# Patient Record
Sex: MALE | Race: WHITE | NOT HISPANIC OR LATINO | Employment: OTHER | URBAN - METROPOLITAN AREA
[De-identification: names, ages, dates, MRNs, and addresses within clinical notes are randomized per-mention and may not be internally consistent; named-entity substitution may affect disease eponyms.]

---

## 2022-03-22 ENCOUNTER — LAB REQUISITION (OUTPATIENT)
Dept: LAB | Facility: CLINIC | Age: 67
End: 2022-03-22
Payer: MEDICARE

## 2022-03-22 DIAGNOSIS — I10 ESSENTIAL (PRIMARY) HYPERTENSION: ICD-10-CM

## 2022-03-22 DIAGNOSIS — Z00.00 ENCOUNTER FOR GENERAL ADULT MEDICAL EXAMINATION WITHOUT ABNORMAL FINDINGS: ICD-10-CM

## 2022-03-22 LAB
ALBUMIN SERPL-MCNC: 3.9 G/DL (ref 3.5–5)
ALP SERPL-CCNC: 86 U/L (ref 45–120)
ALT SERPL W P-5'-P-CCNC: 15 U/L (ref 0–45)
ANION GAP SERPL CALCULATED.3IONS-SCNC: 12 MMOL/L (ref 5–18)
AST SERPL W P-5'-P-CCNC: 23 U/L (ref 0–40)
BILIRUB SERPL-MCNC: 0.9 MG/DL (ref 0–1)
BUN SERPL-MCNC: 14 MG/DL (ref 8–22)
CALCIUM SERPL-MCNC: 10.2 MG/DL (ref 8.5–10.5)
CHLORIDE BLD-SCNC: 103 MMOL/L (ref 98–107)
CO2 SERPL-SCNC: 28 MMOL/L (ref 22–31)
CREAT SERPL-MCNC: 0.8 MG/DL (ref 0.7–1.3)
GFR SERPL CREATININE-BSD FRML MDRD: >90 ML/MIN/1.73M2
GLUCOSE BLD-MCNC: 91 MG/DL (ref 70–125)
POTASSIUM BLD-SCNC: 4.6 MMOL/L (ref 3.5–5)
PROT SERPL-MCNC: 7 G/DL (ref 6–8)
PSA SERPL-MCNC: 1.36 UG/L (ref 0–4.5)
SODIUM SERPL-SCNC: 143 MMOL/L (ref 136–145)

## 2022-03-22 PROCEDURE — 80053 COMPREHEN METABOLIC PANEL: CPT | Mod: ORL | Performed by: FAMILY MEDICINE

## 2022-03-22 PROCEDURE — G0103 PSA SCREENING: HCPCS | Mod: ORL | Performed by: FAMILY MEDICINE

## 2022-04-08 ENCOUNTER — LAB REQUISITION (OUTPATIENT)
Dept: LAB | Facility: CLINIC | Age: 67
End: 2022-04-08
Payer: MEDICARE

## 2022-04-08 DIAGNOSIS — I10 ESSENTIAL (PRIMARY) HYPERTENSION: ICD-10-CM

## 2022-04-08 LAB
ANION GAP SERPL CALCULATED.3IONS-SCNC: 13 MMOL/L (ref 5–18)
BUN SERPL-MCNC: 17 MG/DL (ref 8–22)
CALCIUM SERPL-MCNC: 10 MG/DL (ref 8.5–10.5)
CHLORIDE BLD-SCNC: 102 MMOL/L (ref 98–107)
CO2 SERPL-SCNC: 26 MMOL/L (ref 22–31)
CREAT SERPL-MCNC: 0.84 MG/DL (ref 0.7–1.3)
GFR SERPL CREATININE-BSD FRML MDRD: >90 ML/MIN/1.73M2
GLUCOSE BLD-MCNC: 106 MG/DL (ref 70–125)
POTASSIUM BLD-SCNC: 3.8 MMOL/L (ref 3.5–5)
SODIUM SERPL-SCNC: 141 MMOL/L (ref 136–145)

## 2022-04-08 PROCEDURE — 80048 BASIC METABOLIC PNL TOTAL CA: CPT | Mod: ORL | Performed by: FAMILY MEDICINE

## 2022-05-17 ENCOUNTER — LAB REQUISITION (OUTPATIENT)
Dept: LAB | Facility: CLINIC | Age: 67
End: 2022-05-17
Payer: MEDICARE

## 2022-05-17 DIAGNOSIS — I10 ESSENTIAL (PRIMARY) HYPERTENSION: ICD-10-CM

## 2022-05-17 LAB
ANION GAP SERPL CALCULATED.3IONS-SCNC: 5 MMOL/L (ref 5–18)
BUN SERPL-MCNC: 21 MG/DL (ref 8–22)
CALCIUM SERPL-MCNC: 9.7 MG/DL (ref 8.5–10.5)
CHLORIDE BLD-SCNC: 105 MMOL/L (ref 98–107)
CO2 SERPL-SCNC: 33 MMOL/L (ref 22–31)
CREAT SERPL-MCNC: 0.98 MG/DL (ref 0.7–1.3)
GFR SERPL CREATININE-BSD FRML MDRD: 85 ML/MIN/1.73M2
GLUCOSE BLD-MCNC: 88 MG/DL (ref 70–125)
POTASSIUM BLD-SCNC: 4.4 MMOL/L (ref 3.5–5)
SODIUM SERPL-SCNC: 143 MMOL/L (ref 136–145)

## 2022-05-17 PROCEDURE — 80048 BASIC METABOLIC PNL TOTAL CA: CPT | Mod: ORL | Performed by: FAMILY MEDICINE

## 2022-11-21 ENCOUNTER — LAB REQUISITION (OUTPATIENT)
Dept: LAB | Facility: CLINIC | Age: 67
End: 2022-11-21
Payer: MEDICARE

## 2022-11-21 DIAGNOSIS — I10 ESSENTIAL (PRIMARY) HYPERTENSION: ICD-10-CM

## 2022-11-21 LAB
ALBUMIN SERPL BCG-MCNC: 4.4 G/DL (ref 3.5–5.2)
ALP SERPL-CCNC: 106 U/L (ref 40–129)
ALT SERPL W P-5'-P-CCNC: 24 U/L (ref 10–50)
ANION GAP SERPL CALCULATED.3IONS-SCNC: 11 MMOL/L (ref 7–15)
AST SERPL W P-5'-P-CCNC: 28 U/L (ref 10–50)
BILIRUB SERPL-MCNC: 0.7 MG/DL
BUN SERPL-MCNC: 19.2 MG/DL (ref 8–23)
CALCIUM SERPL-MCNC: 10.3 MG/DL (ref 8.8–10.2)
CHLORIDE SERPL-SCNC: 102 MMOL/L (ref 98–107)
CHOLEST SERPL-MCNC: 228 MG/DL
CREAT SERPL-MCNC: 0.94 MG/DL (ref 0.67–1.17)
DEPRECATED HCO3 PLAS-SCNC: 30 MMOL/L (ref 22–29)
GFR SERPL CREATININE-BSD FRML MDRD: 88 ML/MIN/1.73M2
GLUCOSE SERPL-MCNC: 83 MG/DL (ref 70–99)
HDLC SERPL-MCNC: 44 MG/DL
LDLC SERPL CALC-MCNC: 144 MG/DL
MAGNESIUM SERPL-MCNC: 2.1 MG/DL (ref 1.7–2.3)
NONHDLC SERPL-MCNC: 184 MG/DL
POTASSIUM SERPL-SCNC: 4.5 MMOL/L (ref 3.4–5.3)
PROT SERPL-MCNC: 6.8 G/DL (ref 6.4–8.3)
SODIUM SERPL-SCNC: 143 MMOL/L (ref 136–145)
TRIGL SERPL-MCNC: 199 MG/DL

## 2022-11-21 PROCEDURE — 80053 COMPREHEN METABOLIC PANEL: CPT | Mod: ORL | Performed by: FAMILY MEDICINE

## 2022-11-21 PROCEDURE — 83735 ASSAY OF MAGNESIUM: CPT | Mod: ORL | Performed by: FAMILY MEDICINE

## 2022-11-21 PROCEDURE — 80061 LIPID PANEL: CPT | Mod: ORL | Performed by: FAMILY MEDICINE

## 2023-05-22 ENCOUNTER — LAB REQUISITION (OUTPATIENT)
Dept: LAB | Facility: CLINIC | Age: 68
End: 2023-05-22
Payer: MEDICARE

## 2023-05-22 DIAGNOSIS — I10 ESSENTIAL (PRIMARY) HYPERTENSION: ICD-10-CM

## 2023-05-22 DIAGNOSIS — Z12.5 ENCOUNTER FOR SCREENING FOR MALIGNANT NEOPLASM OF PROSTATE: ICD-10-CM

## 2023-05-22 DIAGNOSIS — E78.2 MIXED HYPERLIPIDEMIA: ICD-10-CM

## 2023-05-22 PROCEDURE — 80053 COMPREHEN METABOLIC PANEL: CPT | Mod: ORL | Performed by: FAMILY MEDICINE

## 2023-05-22 PROCEDURE — G0103 PSA SCREENING: HCPCS | Mod: ORL | Performed by: FAMILY MEDICINE

## 2023-05-22 PROCEDURE — 80061 LIPID PANEL: CPT | Mod: ORL | Performed by: FAMILY MEDICINE

## 2023-05-23 LAB
ALBUMIN SERPL BCG-MCNC: 4.5 G/DL (ref 3.5–5.2)
ALP SERPL-CCNC: 84 U/L (ref 40–129)
ALT SERPL W P-5'-P-CCNC: 41 U/L (ref 10–50)
ANION GAP SERPL CALCULATED.3IONS-SCNC: 14 MMOL/L (ref 7–15)
AST SERPL W P-5'-P-CCNC: 38 U/L (ref 10–50)
BILIRUB SERPL-MCNC: 0.9 MG/DL
BUN SERPL-MCNC: 18.7 MG/DL (ref 8–23)
CALCIUM SERPL-MCNC: 9.4 MG/DL (ref 8.8–10.2)
CHLORIDE SERPL-SCNC: 103 MMOL/L (ref 98–107)
CHOLEST SERPL-MCNC: 153 MG/DL
CREAT SERPL-MCNC: 0.92 MG/DL (ref 0.67–1.17)
DEPRECATED HCO3 PLAS-SCNC: 25 MMOL/L (ref 22–29)
GFR SERPL CREATININE-BSD FRML MDRD: >90 ML/MIN/1.73M2
GLUCOSE SERPL-MCNC: 96 MG/DL (ref 70–99)
HDLC SERPL-MCNC: 54 MG/DL
LDLC SERPL CALC-MCNC: 79 MG/DL
NONHDLC SERPL-MCNC: 99 MG/DL
POTASSIUM SERPL-SCNC: 4.1 MMOL/L (ref 3.4–5.3)
PROT SERPL-MCNC: 6.9 G/DL (ref 6.4–8.3)
PSA SERPL DL<=0.01 NG/ML-MCNC: 1.61 NG/ML (ref 0–4.5)
SODIUM SERPL-SCNC: 142 MMOL/L (ref 136–145)
TRIGL SERPL-MCNC: 102 MG/DL

## 2023-11-27 ENCOUNTER — LAB REQUISITION (OUTPATIENT)
Dept: LAB | Facility: CLINIC | Age: 68
End: 2023-11-27
Payer: MEDICARE

## 2023-11-27 ENCOUNTER — TRANSFERRED RECORDS (OUTPATIENT)
Dept: HEALTH INFORMATION MANAGEMENT | Facility: CLINIC | Age: 68
End: 2023-11-27

## 2023-11-27 DIAGNOSIS — I10 ESSENTIAL (PRIMARY) HYPERTENSION: ICD-10-CM

## 2023-11-27 DIAGNOSIS — E78.2 MIXED HYPERLIPIDEMIA: ICD-10-CM

## 2023-11-27 PROCEDURE — 80053 COMPREHEN METABOLIC PANEL: CPT | Mod: ORL | Performed by: FAMILY MEDICINE

## 2023-11-27 PROCEDURE — 83735 ASSAY OF MAGNESIUM: CPT | Mod: ORL | Performed by: FAMILY MEDICINE

## 2023-11-27 PROCEDURE — 80061 LIPID PANEL: CPT | Mod: ORL | Performed by: FAMILY MEDICINE

## 2023-11-28 LAB
ALBUMIN SERPL BCG-MCNC: 4.4 G/DL (ref 3.5–5.2)
ALP SERPL-CCNC: 74 U/L (ref 40–150)
ALT SERPL W P-5'-P-CCNC: 36 U/L (ref 0–70)
ANION GAP SERPL CALCULATED.3IONS-SCNC: 11 MMOL/L (ref 7–15)
AST SERPL W P-5'-P-CCNC: 46 U/L (ref 0–45)
BILIRUB SERPL-MCNC: 0.8 MG/DL
BUN SERPL-MCNC: 15.9 MG/DL (ref 8–23)
CALCIUM SERPL-MCNC: 9.6 MG/DL (ref 8.8–10.2)
CHLORIDE SERPL-SCNC: 104 MMOL/L (ref 98–107)
CHOLEST SERPL-MCNC: 147 MG/DL
CREAT SERPL-MCNC: 0.92 MG/DL (ref 0.67–1.17)
DEPRECATED HCO3 PLAS-SCNC: 28 MMOL/L (ref 22–29)
EGFRCR SERPLBLD CKD-EPI 2021: 90 ML/MIN/1.73M2
GLUCOSE SERPL-MCNC: 90 MG/DL (ref 70–99)
HDLC SERPL-MCNC: 50 MG/DL
LDLC SERPL CALC-MCNC: 70 MG/DL
MAGNESIUM SERPL-MCNC: 2.1 MG/DL (ref 1.7–2.3)
NONHDLC SERPL-MCNC: 97 MG/DL
POTASSIUM SERPL-SCNC: 4.4 MMOL/L (ref 3.4–5.3)
PROT SERPL-MCNC: 6.9 G/DL (ref 6.4–8.3)
SODIUM SERPL-SCNC: 143 MMOL/L (ref 135–145)
TRIGL SERPL-MCNC: 135 MG/DL

## 2024-01-05 ENCOUNTER — TRANSFERRED RECORDS (OUTPATIENT)
Dept: HEALTH INFORMATION MANAGEMENT | Facility: CLINIC | Age: 69
End: 2024-01-05
Payer: MEDICARE

## 2024-01-11 ENCOUNTER — MEDICAL CORRESPONDENCE (OUTPATIENT)
Dept: HEALTH INFORMATION MANAGEMENT | Facility: CLINIC | Age: 69
End: 2024-01-11
Payer: MEDICARE

## 2024-01-12 ENCOUNTER — TELEPHONE (OUTPATIENT)
Dept: CARDIOLOGY | Facility: CLINIC | Age: 69
End: 2024-01-12
Payer: MEDICARE

## 2024-01-12 NOTE — TELEPHONE ENCOUNTER
Received faxed referral for pt to be seen for an ascending aortic aneurysm - Dr. Padilla to see, referral and records received from Dr. Casa Garrett from [facility], tel [phone] fax [phone].    Message sent to  to update demographics and insurance and call to schedule pt in clinic with Dr. Padilla.

## 2024-01-12 NOTE — TELEPHONE ENCOUNTER
Spoke with pt and for his scheduled for CV consult with Dr Padilla per Dr Guerin from hospitals. PT has not had any other imaging except for CT. Working on getting those images.

## 2024-01-15 ENCOUNTER — TELEPHONE (OUTPATIENT)
Dept: CARDIOLOGY | Facility: CLINIC | Age: 69
End: 2024-01-15
Payer: MEDICARE

## 2024-01-15 NOTE — TELEPHONE ENCOUNTER
Requested CT images from [facility]. They will be pushing to Our PACS  
Airway patent, Nasal mucosa clear. Mouth with normal mucosa.

## 2024-03-05 ENCOUNTER — OFFICE VISIT (OUTPATIENT)
Dept: CARDIOLOGY | Facility: CLINIC | Age: 69
End: 2024-03-05
Payer: MEDICARE

## 2024-03-05 VITALS
HEIGHT: 70 IN | BODY MASS INDEX: 33.36 KG/M2 | WEIGHT: 233 LBS | DIASTOLIC BLOOD PRESSURE: 100 MMHG | SYSTOLIC BLOOD PRESSURE: 162 MMHG | RESPIRATION RATE: 18 BRPM | HEART RATE: 64 BPM

## 2024-03-05 DIAGNOSIS — I71.21 ANEURYSM OF ASCENDING AORTA WITHOUT RUPTURE (H): Primary | ICD-10-CM

## 2024-03-05 DIAGNOSIS — I51.89 OTHER ILL-DEFINED HEART DISEASES: ICD-10-CM

## 2024-03-05 PROCEDURE — 99205 OFFICE O/P NEW HI 60 MIN: CPT | Performed by: SURGERY

## 2024-03-05 RX ORDER — LOSARTAN POTASSIUM AND HYDROCHLOROTHIAZIDE 12.5; 1 MG/1; MG/1
1 TABLET ORAL DAILY
Status: ON HOLD | COMMUNITY
End: 2024-04-06

## 2024-03-05 RX ORDER — ROSUVASTATIN CALCIUM 20 MG/1
20 TABLET, COATED ORAL DAILY
COMMUNITY

## 2024-03-05 NOTE — LETTER
3/5/2024    Casa Garrett MD  [address]  [facility] [address]    RE: Bobby Nelson       Dear Colleague,     I had the pleasure of seeing Bobby Nelson in the [facility].  Cardiac and Thoracic Surgery Consultation      Bobby Nelson MRN# [id number]   YOB: 1955 Age: 68 year old   Date of Admission: (Not on file)     Reason for consult: I was asked by Dr. Casa Garrett to evaluate this patient for an ascending aortic aneuyrsm.           Assessment and Plan:   Mr. Nelson is a 68-year-old man with an ascending aortic aneurysm measuring 5.4 cm in the mid ascending aorta. I discussed the natural history and complications of thoracic aortic aneurysms, including the risk of rupture or dissection. I recommend ascending aortic replacement to repair the aneurysm in lieu of ACC/AHA Guidelines. The sinuses of Valsalva look to be 4 cm or less on the non-contrast CT and the distal ascending aorta at the base of the innominate artery is <4cm  as well. These measurements will need to be confirmed. Preoperatively, he will need a CTA chest/abdomen/pelvis, coronary angiography, transthoracic echocardiography, carotid US, and dental clearance.     I described the technical details, as well as the expected postoperative course and recovery to the patient. I also discussed the risks and benefits of the procedure. The risks include but are not limited to bleeding, infection, stroke, heart failure, dysrhythmia, respiratory failure, kidney or liver injury, bowel or limb ischemia, and death. The estimated risk of mortality is 1-2%, and the estimated risk of major morbidity or mortality is <10%. The patient understands these risks and wishes to undergo the operation.     Surgery will be scheduled in the coming months.             Chief Complaint:   Mr. Nelson is a 68-year-old man with an ascending aortic aneurysm measuring 5.4 cm in the mid ascending aorta.    History is obtained from the  patient         History of Present Illness:   This patient is a 68 year old male who presents to discuss treatment of his ascending aortic aneurysm measuring 5.4 cm in the mid ascending aorta. He is followed by Dr. Casa Garrett at [facility], and  CT chest was obtained for screening purposes. He was noted to have the ascending thoracic aortic aneurysm. He does not have a family history of aneurysms, aortic dissection, or sudden death. 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 Unfortunately, he suffered a right upper extremity fracture recently and had surgery for that, but he is making a nice recovery.                 Past Medical History:   No past medical history on file.          Past Surgical History:   No past surgical history on file.            Social History:     Social History     Tobacco Use    Smoking status: Not on file    Smokeless tobacco: Not on file   Substance Use Topics    Alcohol use: Not on file             Family History:   No family history on file.          Immunizations:     Immunization History   Administered Date(s) Administered    COVID-19 Monovalent 18+ (Moderna) 03/10/2021, 04/07/2021             Allergies:   Not on File          Medications:     No current [facility]-ordered outpatient medications on file.     No current [facility]-ordered facility-administered medications on file.             Review of Systems:     The 10 point Review of Systems is negative other than noted in the HPI            Physical Exam:   Vitals were reviewed      BP: (!) 162/100 Pulse: 64   Resp: 18        Constitutional:   awake, alert, cooperative, no apparent distress, and appears stated age     Eyes:   Lids and lashes normal, pupils equal, round and reactive to light, extra ocular muscles intact, sclera clear, conjunctiva normal     ENT:   normocephalic, without obvious abnormality     Neck:   supple, symmetrical, trachea midline     Lungs:   no increased work of  breathing, good air exchange, and no retractions     Cardiovascular:   regular rate and rhythm     Abdomen:   non-distended     Musculoskeletal:   no lower extremity pitting edema present  there is no redness, warmth, or swelling of the joints  full range of motion noted  motor strength is 5 out of 5 all extremities bilaterally     Neurologic:   Mental Status Exam:  Level of Alertness:   awake  Orientation:   person, place, time  Cranial Nerves:  cranial nerves II-XII are grossly intact  Motor Exam:  moves all extremities well and symmetrically     Neuropsychiatric:   General: normal, calm, and normal eye contact  Level of consciousness: alert / normal  Affect: normal     Skin:   no bruising or bleeding, normal skin color, texture, turgor, and no redness, warmth, or swelling          Data:   All laboratory data reviewed  All cardiac studies reviewed by me.  All imaging studies reviewed by me.      Thank you for allowing me to participate in the care of your patient.      Sincerely,     James Padilla MD     [facility]  cc:   No referring provider defined for this encounter.

## 2024-03-05 NOTE — H&P (VIEW-ONLY)
Cardiac and Thoracic Surgery Consultation      Bobby Nelson MRN# [id number]   YOB: 1955 Age: 68 year old   Date of Admission: (Not on file)     Reason for consult: I was asked by Dr. Casa Garrett to evaluate this patient for an ascending aortic aneuyrsm.           Assessment and Plan:   Mr. Nelson is a 68-year-old man with an ascending aortic aneurysm measuring 5.4 cm in the mid ascending aorta. I discussed the natural history and complications of thoracic aortic aneurysms, including the risk of rupture or dissection. I recommend ascending aortic replacement to repair the aneurysm in lieu of ACC/AHA Guidelines. The sinuses of Valsalva look to be 4 cm or less on the non-contrast CT and the distal ascending aorta at the base of the innominate artery is <4cm  as well. These measurements will need to be confirmed. Preoperatively, he will need a CTA chest/abdomen/pelvis, coronary angiography, transthoracic echocardiography, carotid US, and dental clearance.     I described the technical details, as well as the expected postoperative course and recovery to the patient. I also discussed the risks and benefits of the procedure. The risks include but are not limited to bleeding, infection, stroke, heart failure, dysrhythmia, respiratory failure, kidney or liver injury, bowel or limb ischemia, and death. The estimated risk of mortality is 1-2%, and the estimated risk of major morbidity or mortality is <10%. The patient understands these risks and wishes to undergo the operation.     Surgery will be scheduled in the coming months.             Chief Complaint:   Mr. Nelson is a 68-year-old man with an ascending aortic aneurysm measuring 5.4 cm in the mid ascending aorta.    History is obtained from the patient         History of Present Illness:   This patient is a 68 year old male who presents to discuss treatment of his ascending aortic aneurysm measuring 5.4 cm in the mid ascending aorta. He is followed  by Dr. Casa Garrett at [facility], and  CT chest was obtained for screening purposes. He was noted to have the ascending thoracic aortic aneurysm. He does not have a family history of aneurysms, aortic dissection, or sudden death. 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 Unfortunately, he suffered a right upper extremity fracture recently and had surgery for that, but he is making a nice recovery.                 Past Medical History:   No past medical history on file.          Past Surgical History:   No past surgical history on file.            Social History:     Social History     Tobacco Use    Smoking status: Not on file    Smokeless tobacco: Not on file   Substance Use Topics    Alcohol use: Not on file             Family History:   No family history on file.          Immunizations:     Immunization History   Administered Date(s) Administered    COVID-19 Monovalent 18+ (Moderna) 03/10/2021, 04/07/2021             Allergies:   Not on File          Medications:     No current Epic-ordered outpatient medications on file.     No current [facility]-ordered facility-administered medications on file.             Review of Systems:     The 10 point Review of Systems is negative other than noted in the HPI            Physical Exam:   Vitals were reviewed      BP: (!) 162/100 Pulse: 64   Resp: 18        Constitutional:   awake, alert, cooperative, no apparent distress, and appears stated age     Eyes:   Lids and lashes normal, pupils equal, round and reactive to light, extra ocular muscles intact, sclera clear, conjunctiva normal     ENT:   normocephalic, without obvious abnormality     Neck:   supple, symmetrical, trachea midline     Lungs:   no increased work of breathing, good air exchange, and no retractions     Cardiovascular:   regular rate and rhythm     Abdomen:   non-distended     Musculoskeletal:   no lower extremity pitting edema present  there is no redness,  warmth, or swelling of the joints  full range of motion noted  motor strength is 5 out of 5 all extremities bilaterally     Neurologic:   Mental Status Exam:  Level of Alertness:   awake  Orientation:   person, place, time  Cranial Nerves:  cranial nerves II-XII are grossly intact  Motor Exam:  moves all extremities well and symmetrically     Neuropsychiatric:   General: normal, calm, and normal eye contact  Level of consciousness: alert / normal  Affect: normal     Skin:   no bruising or bleeding, normal skin color, texture, turgor, and no redness, warmth, or swelling          Data:   All laboratory data reviewed  All cardiac studies reviewed by me.  All imaging studies reviewed by me.

## 2024-03-05 NOTE — PATIENT INSTRUCTIONS
You were seen today in the [facility]    Schedule prior to surgery:    Dental clearance  CT chest/abdomen/pelvis with contrast  Echocardiogram  Angiogram    Please call ANN Bruno surgery coordinator with any questions.  Thank you.    Kiana Durham RN  Cardiovascular Surgery  Phone [phone]  Fax [phone]

## 2024-03-05 NOTE — PROGRESS NOTES
Cardiac and Thoracic Surgery Consultation      Bobby Nelson MRN# [id number]   YOB: 1955 Age: 68 year old   Date of Admission: (Not on file)     Reason for consult: I was asked by Dr. Casa Garrett to evaluate this patient for an ascending aortic aneuyrsm.           Assessment and Plan:   Mr. Nelson is a 68-year-old man with an ascending aortic aneurysm measuring 5.4 cm in the mid ascending aorta. I discussed the natural history and complications of thoracic aortic aneurysms, including the risk of rupture or dissection. I recommend ascending aortic replacement to repair the aneurysm in lieu of ACC/AHA Guidelines. The sinuses of Valsalva look to be 4 cm or less on the non-contrast CT and the distal ascending aorta at the base of the innominate artery is <4cm  as well. These measurements will need to be confirmed. Preoperatively, he will need a CTA chest/abdomen/pelvis, coronary angiography, transthoracic echocardiography, carotid US, and dental clearance.     I described the technical details, as well as the expected postoperative course and recovery to the patient. I also discussed the risks and benefits of the procedure. The risks include but are not limited to bleeding, infection, stroke, heart failure, dysrhythmia, respiratory failure, kidney or liver injury, bowel or limb ischemia, and death. The estimated risk of mortality is 1-2%, and the estimated risk of major morbidity or mortality is <10%. The patient understands these risks and wishes to undergo the operation.     Surgery will be scheduled in the coming months.             Chief Complaint:   Mr. Nelson is a 68-year-old man with an ascending aortic aneurysm measuring 5.4 cm in the mid ascending aorta.    History is obtained from the patient         History of Present Illness:   This patient is a 68 year old male who presents to discuss treatment of his ascending aortic aneurysm measuring 5.4 cm in the mid ascending aorta. He is followed  by Dr. Casa Garrett at [facility], and  CT chest was obtained for screening purposes. He was noted to have the ascending thoracic aortic aneurysm. He does not have a family history of aneurysms, aortic dissection, or sudden death. 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 Unfortunately, he suffered a right upper extremity fracture recently and had surgery for that, but he is making a nice recovery.                 Past Medical History:   No past medical history on file.          Past Surgical History:   No past surgical history on file.            Social History:     Social History     Tobacco Use    Smoking status: Not on file    Smokeless tobacco: Not on file   Substance Use Topics    Alcohol use: Not on file             Family History:   No family history on file.          Immunizations:     Immunization History   Administered Date(s) Administered    COVID-19 Monovalent 18+ (Moderna) 03/10/2021, 04/07/2021             Allergies:   Not on File          Medications:     No current Epic-ordered outpatient medications on file.     No current [facility]-ordered facility-administered medications on file.             Review of Systems:     The 10 point Review of Systems is negative other than noted in the HPI            Physical Exam:   Vitals were reviewed      BP: (!) 162/100 Pulse: 64   Resp: 18        Constitutional:   awake, alert, cooperative, no apparent distress, and appears stated age     Eyes:   Lids and lashes normal, pupils equal, round and reactive to light, extra ocular muscles intact, sclera clear, conjunctiva normal     ENT:   normocephalic, without obvious abnormality     Neck:   supple, symmetrical, trachea midline     Lungs:   no increased work of breathing, good air exchange, and no retractions     Cardiovascular:   regular rate and rhythm     Abdomen:   non-distended     Musculoskeletal:   no lower extremity pitting edema present  there is no redness,  warmth, or swelling of the joints  full range of motion noted  motor strength is 5 out of 5 all extremities bilaterally     Neurologic:   Mental Status Exam:  Level of Alertness:   awake  Orientation:   person, place, time  Cranial Nerves:  cranial nerves II-XII are grossly intact  Motor Exam:  moves all extremities well and symmetrically     Neuropsychiatric:   General: normal, calm, and normal eye contact  Level of consciousness: alert / normal  Affect: normal     Skin:   no bruising or bleeding, normal skin color, texture, turgor, and no redness, warmth, or swelling          Data:   All laboratory data reviewed  All cardiac studies reviewed by me.  All imaging studies reviewed by me.

## 2024-03-06 ENCOUNTER — TELEPHONE (OUTPATIENT)
Dept: CARDIOLOGY | Facility: CLINIC | Age: 69
End: 2024-03-06
Payer: MEDICARE

## 2024-03-06 ENCOUNTER — TELEPHONE (OUTPATIENT)
Dept: ADMINISTRATIVE | Facility: CLINIC | Age: 69
End: 2024-03-06
Payer: MEDICARE

## 2024-03-06 ENCOUNTER — PREP FOR PROCEDURE (OUTPATIENT)
Dept: ADMINISTRATIVE | Facility: CLINIC | Age: 69
End: 2024-03-06
Payer: MEDICARE

## 2024-03-06 DIAGNOSIS — I71.21 ANEURYSM OF ASCENDING AORTA WITHOUT RUPTURE (H): Primary | ICD-10-CM

## 2024-03-06 DIAGNOSIS — I51.89 OTHER ILL-DEFINED HEART DISEASES: ICD-10-CM

## 2024-03-06 RX ORDER — SODIUM CHLORIDE 9 MG/ML
INJECTION, SOLUTION INTRAVENOUS CONTINUOUS
Status: CANCELLED | OUTPATIENT
Start: 2024-03-14

## 2024-03-06 RX ORDER — LIDOCAINE 40 MG/G
CREAM TOPICAL
Status: CANCELLED | OUTPATIENT
Start: 2024-03-06

## 2024-03-06 RX ORDER — ASPIRIN 81 MG/1
243 TABLET, CHEWABLE ORAL ONCE
Status: CANCELLED | OUTPATIENT
Start: 2024-03-14

## 2024-03-06 RX ORDER — FENTANYL CITRATE 50 UG/ML
25 INJECTION, SOLUTION INTRAMUSCULAR; INTRAVENOUS
Status: CANCELLED | OUTPATIENT
Start: 2024-03-14

## 2024-03-06 RX ORDER — ASPIRIN 325 MG
325 TABLET ORAL ONCE
Status: CANCELLED | OUTPATIENT
Start: 2024-03-14 | End: 2024-03-06

## 2024-03-06 NOTE — TELEPHONE ENCOUNTER
Per task, pt needs to schedule surgery with Dr. Padilla. LM asking pt to call back. Will try again later

## 2024-03-11 ENCOUNTER — TELEPHONE (OUTPATIENT)
Dept: CARDIOLOGY | Facility: CLINIC | Age: 69
End: 2024-03-11
Payer: MEDICARE

## 2024-03-11 DIAGNOSIS — I51.89 OTHER ILL-DEFINED HEART DISEASES: ICD-10-CM

## 2024-03-11 DIAGNOSIS — I71.21 ANEURYSM OF ASCENDING AORTA WITHOUT RUPTURE (H): Primary | ICD-10-CM

## 2024-03-11 DIAGNOSIS — R79.89 OTHER SPECIFIED ABNORMAL FINDINGS OF BLOOD CHEMISTRY: ICD-10-CM

## 2024-03-11 DIAGNOSIS — I99.8 OTHER DISORDER OF CIRCULATORY SYSTEM: ICD-10-CM

## 2024-03-11 LAB
ABO/RH(D): NORMAL
ANTIBODY SCREEN: NEGATIVE
SPECIMEN EXPIRATION DATE: NORMAL

## 2024-03-11 RX ORDER — PHENYLEPHRINE HCL IN 0.9% NACL 50MG/250ML
.1-6 PLASTIC BAG, INJECTION (ML) INTRAVENOUS CONTINUOUS
Status: CANCELLED | OUTPATIENT
Start: 2024-04-02

## 2024-03-11 RX ORDER — CEFAZOLIN SODIUM/WATER 2 G/20 ML
2 SYRINGE (ML) INTRAVENOUS
Status: CANCELLED | OUTPATIENT
Start: 2024-04-02

## 2024-03-11 RX ORDER — CHLORHEXIDINE GLUCONATE ORAL RINSE 1.2 MG/ML
10 SOLUTION DENTAL ONCE
Status: CANCELLED | OUTPATIENT
Start: 2024-04-02 | End: 2024-03-11

## 2024-03-11 RX ORDER — CEFAZOLIN SODIUM/WATER 2 G/20 ML
2 SYRINGE (ML) INTRAVENOUS SEE ADMIN INSTRUCTIONS
Status: CANCELLED | OUTPATIENT
Start: 2024-04-02

## 2024-03-11 RX ORDER — DEXMEDETOMIDINE HYDROCHLORIDE 4 UG/ML
.2-1.2 INJECTION, SOLUTION INTRAVENOUS CONTINUOUS
Status: CANCELLED | OUTPATIENT
Start: 2024-04-02

## 2024-03-11 RX ORDER — SODIUM CHLORIDE, SODIUM GLUCONATE, SODIUM ACETATE, POTASSIUM CHLORIDE AND MAGNESIUM CHLORIDE 526; 502; 368; 37; 30 MG/100ML; MG/100ML; MG/100ML; MG/100ML; MG/100ML
1000 INJECTION, SOLUTION INTRAVENOUS
Status: CANCELLED | OUTPATIENT
Start: 2024-04-02 | End: 2024-04-02

## 2024-03-11 RX ORDER — LIDOCAINE 40 MG/G
CREAM TOPICAL
Status: CANCELLED | OUTPATIENT
Start: 2024-03-11

## 2024-03-11 NOTE — TELEPHONE ENCOUNTER
Coronary Angiogram Instructions:     Bobby Nelson  [address] [facility] [address] [facility] [address]  [phone] (home) [phone] (work)     You are scheduled for a coronary angiogram on Thursday, March 14 at [facility] ([facility] [address] [place] [address].       Your arrival time is 06:30 AM.  Location is [facility] - [address] [place] [address] - Main Entrance of the Hospital  Please plan on being at the hospital all day.  At any time, emergencies and/or urgent cases may come up which could delay the start of your procedure.     March 12 Your blood work is scheduled at 2:15 PM at [facility] , [address], [place], [place] .     Pre-procedure instructions - Coronary Angiogram  Patient Education     Please check in at the main hospital desk at [facility] [address] [place] [address]. From there you will be directed to the second floor where you will check in at the Card[facility] ([facility]) for your procedure. Heart care staff at the desk will give you further direction by asking you to sit in the waiting room until a member from [facility] is able to come out and escort you back to the designated room for pre-procedure.     Pre-procedure medication instructions  Patient instructed on antiplatelet medication.  Continue medications as scheduled, with a small amount of water on the day of the procedure unless indicated.  Patient instructed to take 325 mg of Aspirin am of procedure: Yes  Other medication: instructed to only take LOSARTAN the morning of the procedure.      Hold seven (7) days prior for once weekly injectable doses [semaglutide (Ozempic, Wegovy), dulaglutide  (Trulicity), exenatide ER (Bydureon), tirzepatide (Mounjaro)]   Hold the day before and day of for once daily injectable GLP-1 agonists [exenatide (Byetta), liraglutide (Saxenda,  Victoza)]   Hold seven (7) days for oral semaglutide  (Rybelsus)     COVID-19: Patient was informed if they develop cold like symptoms they should self test for COVID-19 at home. If results are positive, they should call [phone] to discuss next steps. This may include cancelling and rescheduling their procedure.  If patient is asymptomatic, no need to test for COVID-19.     Pre-procedure instructions  Patient instructed to be NPO after midnight.  Patient instructed to shower the evening before or the morning of the procedure.  Patient instructed to arrange for transportation home following procedure from a responsible family member of friend. No driving for at least 24 hours.  Patient instructed to have a responsible adult with them for 24 hours post-procedure.  Post-procedure follow up process.  Conscious sedation discussed.    ----- Message from Kiana Durham RN sent at 3/6/2024 10:11 AM CST -----  Review CATH instructions for 3/14    ----- Message -----  From: Shayna Ordonez  Sent: 3/6/2024   9:42 AM CST  To: Kiana Durham RN    Case type: LHC/CORS  Procedure Physician(s): JOSE DE JESUS/CALI  Procedure Date and Patient Arrival Time: Thursday 3/14, with arrival time of 0630  H&P: Completed on 3/5 [facility]  Pre-Procedure Lab Appt: Tuesday 3/12 at  - Please place lab orders if you haven't already!  Alerts/Important Info: None  Interpretor Requested: n/a    Thank Shayna hogan       ----- Message -----  From: Kiana Durham RN  Sent: 3/6/2024   9:15 AM CST  To: [facility] Procedure  Pool - Lhe    Hello!    Please call this pt to schedule a LHC/CORS. He has no alerts/allergies. Lab orders are in.    Thank you,  Kiana

## 2024-03-12 ENCOUNTER — HOSPITAL ENCOUNTER (OUTPATIENT)
Dept: CARDIOLOGY | Facility: CLINIC | Age: 69
Discharge: HOME | End: 2024-03-12
Attending: SURGERY | Admitting: SURGERY
Payer: MEDICARE

## 2024-03-12 ENCOUNTER — TELEPHONE (OUTPATIENT)
Dept: CARDIOLOGY | Facility: CLINIC | Age: 69
End: 2024-03-12

## 2024-03-12 ENCOUNTER — LAB (OUTPATIENT)
Dept: CARDIOLOGY | Facility: CLINIC | Age: 69
End: 2024-03-12
Payer: MEDICARE

## 2024-03-12 ENCOUNTER — HOSPITAL ENCOUNTER (OUTPATIENT)
Dept: CT IMAGING | Facility: HOSPITAL | Age: 69
Discharge: HOME | End: 2024-03-12
Attending: SURGERY | Admitting: SURGERY
Payer: MEDICARE

## 2024-03-12 DIAGNOSIS — I71.21 ANEURYSM OF ASCENDING AORTA WITHOUT RUPTURE (H): ICD-10-CM

## 2024-03-12 DIAGNOSIS — I51.89 OTHER ILL-DEFINED HEART DISEASES: ICD-10-CM

## 2024-03-12 LAB
ANION GAP SERPL CALCULATED.3IONS-SCNC: 9 MMOL/L (ref 7–15)
BUN SERPL-MCNC: 15.5 MG/DL (ref 8–23)
CALCIUM SERPL-MCNC: 9.5 MG/DL (ref 8.8–10.2)
CHLORIDE SERPL-SCNC: 103 MMOL/L (ref 98–107)
CREAT SERPL-MCNC: 0.92 MG/DL (ref 0.67–1.17)
DEPRECATED HCO3 PLAS-SCNC: 31 MMOL/L (ref 22–29)
EGFRCR SERPLBLD CKD-EPI 2021: 90 ML/MIN/1.73M2
ERYTHROCYTE [DISTWIDTH] IN BLOOD BY AUTOMATED COUNT: 12.9 % (ref 10–15)
GLUCOSE SERPL-MCNC: 88 MG/DL (ref 70–99)
HCT VFR BLD AUTO: 45.7 % (ref 40–53)
HGB BLD-MCNC: 14.9 G/DL (ref 13.3–17.7)
LVEF ECHO: NORMAL
MCH RBC QN AUTO: 29.7 PG (ref 26.5–33)
MCHC RBC AUTO-ENTMCNC: 32.6 G/DL (ref 31.5–36.5)
MCV RBC AUTO: 91 FL (ref 78–100)
PLATELET # BLD AUTO: 244 10E3/UL (ref 150–450)
POTASSIUM SERPL-SCNC: 4.1 MMOL/L (ref 3.4–5.3)
RBC # BLD AUTO: 5.01 10E6/UL (ref 4.4–5.9)
SODIUM SERPL-SCNC: 143 MMOL/L (ref 135–145)
WBC # BLD AUTO: 6.3 10E3/UL (ref 4–11)

## 2024-03-12 PROCEDURE — 85027 COMPLETE CBC AUTOMATED: CPT

## 2024-03-12 PROCEDURE — 255N000002 HC RX 255 OP 636: Performed by: SURGERY

## 2024-03-12 PROCEDURE — 36415 COLL VENOUS BLD VENIPUNCTURE: CPT

## 2024-03-12 PROCEDURE — 80048 BASIC METABOLIC PNL TOTAL CA: CPT

## 2024-03-12 PROCEDURE — 93306 TTE W/DOPPLER COMPLETE: CPT | Mod: 26 | Performed by: STUDENT IN AN ORGANIZED HEALTH CARE EDUCATION/TRAINING PROGRAM

## 2024-03-12 PROCEDURE — 250N000011 HC RX IP 250 OP 636: Performed by: SURGERY

## 2024-03-12 PROCEDURE — 86900 BLOOD TYPING SEROLOGIC ABO: CPT

## 2024-03-12 PROCEDURE — 86850 RBC ANTIBODY SCREEN: CPT

## 2024-03-12 PROCEDURE — 86901 BLOOD TYPING SEROLOGIC RH(D): CPT

## 2024-03-12 PROCEDURE — C8929 TTE W OR WO FOL WCON,DOPPLER: HCPCS

## 2024-03-12 PROCEDURE — 74174 CTA ABD&PLVS W/CONTRAST: CPT | Mod: MG

## 2024-03-12 RX ORDER — IOPAMIDOL 755 MG/ML
72 INJECTION, SOLUTION INTRAVASCULAR ONCE
Status: COMPLETED | OUTPATIENT
Start: 2024-03-12 | End: 2024-03-12

## 2024-03-12 RX ADMIN — IOPAMIDOL 72 ML: 755 INJECTION, SOLUTION INTRAVENOUS at 16:42

## 2024-03-12 RX ADMIN — PERFLUTREN 3 ML: 6.52 INJECTION, SUSPENSION INTRAVENOUS at 15:38

## 2024-03-12 NOTE — TELEPHONE ENCOUNTER
Spouse called with questions regarding surgery, angiogram and work up. Reviewed with her the plan for surgery, preop testing scheduled, and hereditary concerns.    Patient is scheduled to complete dental work on 3/18. Spouse has a blank dental clearance form she will send once his work is completed. She will also make his postop PCP follow up appointment now.    All questions addressed. Spouse requesting more information from Dr. Padilla regarding hereditary and genetic concerns for an aneurysm in their son.

## 2024-03-14 ENCOUNTER — HOSPITAL ENCOUNTER (OUTPATIENT)
Facility: HOSPITAL | Age: 69
Discharge: HOME | End: 2024-03-14
Attending: INTERNAL MEDICINE | Admitting: INTERNAL MEDICINE
Payer: MEDICARE

## 2024-03-14 VITALS
BODY MASS INDEX: 32.93 KG/M2 | RESPIRATION RATE: 16 BRPM | OXYGEN SATURATION: 97 % | SYSTOLIC BLOOD PRESSURE: 172 MMHG | HEIGHT: 70 IN | TEMPERATURE: 98 F | HEART RATE: 51 BPM | DIASTOLIC BLOOD PRESSURE: 104 MMHG | WEIGHT: 230 LBS

## 2024-03-14 DIAGNOSIS — I51.89 OTHER ILL-DEFINED HEART DISEASES: ICD-10-CM

## 2024-03-14 DIAGNOSIS — I71.21 ANEURYSM OF ASCENDING AORTA WITHOUT RUPTURE (H): ICD-10-CM

## 2024-03-14 PROBLEM — Z98.890 STATUS POST CORONARY ANGIOGRAM: Status: ACTIVE | Noted: 2024-03-14

## 2024-03-14 LAB
ATRIAL RATE - MUSE: 57 BPM
DIASTOLIC BLOOD PRESSURE - MUSE: NORMAL MMHG
INTERPRETATION ECG - MUSE: NORMAL
P AXIS - MUSE: -2 DEGREES
PR INTERVAL - MUSE: 202 MS
QRS DURATION - MUSE: 108 MS
QT - MUSE: 442 MS
QTC - MUSE: 430 MS
R AXIS - MUSE: -24 DEGREES
SYSTOLIC BLOOD PRESSURE - MUSE: NORMAL MMHG
T AXIS - MUSE: 33 DEGREES
VENTRICULAR RATE- MUSE: 57 BPM

## 2024-03-14 PROCEDURE — C1887 CATHETER, GUIDING: HCPCS | Performed by: INTERNAL MEDICINE

## 2024-03-14 PROCEDURE — 93010 ELECTROCARDIOGRAM REPORT: CPT | Mod: HOP | Performed by: INTERNAL MEDICINE

## 2024-03-14 PROCEDURE — 250N000011 HC RX IP 250 OP 636: Performed by: INTERNAL MEDICINE

## 2024-03-14 PROCEDURE — 999N000054 HC STATISTIC EKG NON-CHARGEABLE

## 2024-03-14 PROCEDURE — 258N000003 HC RX IP 258 OP 636: Performed by: SURGERY

## 2024-03-14 PROCEDURE — 250N000013 HC RX MED GY IP 250 OP 250 PS 637: Performed by: NURSE PRACTITIONER

## 2024-03-14 PROCEDURE — 93458 L HRT ARTERY/VENTRICLE ANGIO: CPT | Performed by: INTERNAL MEDICINE

## 2024-03-14 PROCEDURE — 99152 MOD SED SAME PHYS/QHP 5/>YRS: CPT | Performed by: INTERNAL MEDICINE

## 2024-03-14 PROCEDURE — 250N000009 HC RX 250: Performed by: INTERNAL MEDICINE

## 2024-03-14 PROCEDURE — 93458 L HRT ARTERY/VENTRICLE ANGIO: CPT | Mod: 26 | Performed by: INTERNAL MEDICINE

## 2024-03-14 PROCEDURE — C1894 INTRO/SHEATH, NON-LASER: HCPCS | Performed by: INTERNAL MEDICINE

## 2024-03-14 PROCEDURE — 255N000002 HC RX 255 OP 636: Performed by: INTERNAL MEDICINE

## 2024-03-14 PROCEDURE — C1769 GUIDE WIRE: HCPCS | Performed by: INTERNAL MEDICINE

## 2024-03-14 PROCEDURE — 272N000001 HC OR GENERAL SUPPLY STERILE: Performed by: INTERNAL MEDICINE

## 2024-03-14 PROCEDURE — 93005 ELECTROCARDIOGRAM TRACING: CPT

## 2024-03-14 RX ORDER — FENTANYL CITRATE 50 UG/ML
25 INJECTION, SOLUTION INTRAMUSCULAR; INTRAVENOUS
Status: DISCONTINUED | OUTPATIENT
Start: 2024-03-14 | End: 2024-03-14 | Stop reason: HOSPADM

## 2024-03-14 RX ORDER — ACETAMINOPHEN 325 MG/1
650 TABLET ORAL EVERY 4 HOURS PRN
Status: DISCONTINUED | OUTPATIENT
Start: 2024-03-14 | End: 2024-03-14 | Stop reason: HOSPADM

## 2024-03-14 RX ORDER — LIDOCAINE HYDROCHLORIDE 20 MG/ML
INJECTION, SOLUTION INFILTRATION; PERINEURAL
Status: DISCONTINUED | OUTPATIENT
Start: 2024-03-14 | End: 2024-03-14 | Stop reason: HOSPADM

## 2024-03-14 RX ORDER — NALOXONE HYDROCHLORIDE 0.4 MG/ML
0.4 INJECTION, SOLUTION INTRAMUSCULAR; INTRAVENOUS; SUBCUTANEOUS
Status: DISCONTINUED | OUTPATIENT
Start: 2024-03-14 | End: 2024-03-14 | Stop reason: HOSPADM

## 2024-03-14 RX ORDER — ASPIRIN 81 MG/1
243 TABLET, CHEWABLE ORAL ONCE
Status: COMPLETED | OUTPATIENT
Start: 2024-03-14 | End: 2024-03-14

## 2024-03-14 RX ORDER — OXYCODONE HYDROCHLORIDE 5 MG/1
5 TABLET ORAL EVERY 4 HOURS PRN
Status: DISCONTINUED | OUTPATIENT
Start: 2024-03-14 | End: 2024-03-14 | Stop reason: HOSPADM

## 2024-03-14 RX ORDER — DIAZEPAM 5 MG
5 TABLET ORAL ONCE
Status: COMPLETED | OUTPATIENT
Start: 2024-03-14 | End: 2024-03-14

## 2024-03-14 RX ORDER — IODIXANOL 320 MG/ML
INJECTION, SOLUTION INTRAVASCULAR
Status: DISCONTINUED | OUTPATIENT
Start: 2024-03-14 | End: 2024-03-14 | Stop reason: HOSPADM

## 2024-03-14 RX ORDER — FENTANYL CITRATE 50 UG/ML
INJECTION, SOLUTION INTRAMUSCULAR; INTRAVENOUS
Status: DISCONTINUED | OUTPATIENT
Start: 2024-03-14 | End: 2024-03-14 | Stop reason: HOSPADM

## 2024-03-14 RX ORDER — LIDOCAINE 40 MG/G
CREAM TOPICAL
Status: DISCONTINUED | OUTPATIENT
Start: 2024-03-14 | End: 2024-03-14 | Stop reason: HOSPADM

## 2024-03-14 RX ORDER — ASPIRIN 325 MG
325 TABLET ORAL ONCE
Status: COMPLETED | OUTPATIENT
Start: 2024-03-14 | End: 2024-03-14

## 2024-03-14 RX ORDER — ATROPINE SULFATE 0.1 MG/ML
0.5 INJECTION INTRAVENOUS
Status: DISCONTINUED | OUTPATIENT
Start: 2024-03-14 | End: 2024-03-14 | Stop reason: HOSPADM

## 2024-03-14 RX ORDER — NALOXONE HYDROCHLORIDE 0.4 MG/ML
0.2 INJECTION, SOLUTION INTRAMUSCULAR; INTRAVENOUS; SUBCUTANEOUS
Status: DISCONTINUED | OUTPATIENT
Start: 2024-03-14 | End: 2024-03-14 | Stop reason: HOSPADM

## 2024-03-14 RX ORDER — FLUMAZENIL 0.1 MG/ML
0.2 INJECTION, SOLUTION INTRAVENOUS
Status: DISCONTINUED | OUTPATIENT
Start: 2024-03-14 | End: 2024-03-14 | Stop reason: HOSPADM

## 2024-03-14 RX ORDER — SODIUM CHLORIDE 9 MG/ML
INJECTION, SOLUTION INTRAVENOUS CONTINUOUS
Status: DISCONTINUED | OUTPATIENT
Start: 2024-03-14 | End: 2024-03-14 | Stop reason: HOSPADM

## 2024-03-14 RX ORDER — OXYCODONE HYDROCHLORIDE 5 MG/1
10 TABLET ORAL EVERY 4 HOURS PRN
Status: DISCONTINUED | OUTPATIENT
Start: 2024-03-14 | End: 2024-03-14 | Stop reason: HOSPADM

## 2024-03-14 RX ADMIN — SODIUM CHLORIDE: 9 INJECTION, SOLUTION INTRAVENOUS at 07:09

## 2024-03-14 RX ADMIN — DIAZEPAM 5 MG: 5 TABLET ORAL at 08:21

## 2024-03-14 ASSESSMENT — ACTIVITIES OF DAILY LIVING (ADL)
ADLS_ACUITY_SCORE: 35

## 2024-03-14 ASSESSMENT — EJECTION FRACTION: EF_VALUE: .34

## 2024-03-14 NOTE — Clinical Note
Hemodynamic equipment used: 5 lead ECG, [employer] With 3 Leads, Machine BP Cuff and pulse oximeter probe.

## 2024-03-14 NOTE — DISCHARGE INSTRUCTIONS

## 2024-03-14 NOTE — INTERVAL H&P NOTE
"I have reviewed the surgical (or preoperative) H&P that is linked to this encounter, and examined the patient. There are no significant changes    Clinical Conditions Present on Arrival:  Clinically Significant Risk Factors Present on Admission                  # Obesity: Estimated body mass index is 33 kg/m  as calculated from the following:    Height as of this encounter: 1.778 m (5' 10\").    Weight as of this encounter: 104.3 kg (230 lb).       "

## 2024-03-14 NOTE — PRE-PROCEDURE
GENERAL PRE-PROCEDURE:   Procedure:  Coronary angiogram, left heart catheterization  Date/Time:  3/14/2024 7:08 AM    Written consent obtained?: Yes    Risks and benefits: Risks, benefits and alternatives were discussed    Consent given by:  Patient  Patient states understanding of procedure being performed: Yes    Patient's understanding of procedure matches consent: Yes    Procedure consent matches procedure scheduled: Yes    Expected level of sedation:  Moderate  Appropriately NPO:  Yes  ASA Class:  3 (ascending aortic aneurysm; 5.4cm)  Mallampati  :  Grade 3- soft palate visible, posterior pharyngeal wall not visible  Lungs:  Lungs clear with good breath sounds bilaterally  Heart:  Normal heart sounds and rate  History & Physical reviewed:  History and physical reviewed and updates made (see comment)  H&P Comments:  History & Physical reviewed:  History and physical reviewed and updates made (see comment)  H&P Comments:  Clinically Significant Risk Factors Present on Admission     Cardiovascular : Ascending aortic aneurysm; 5.4cm     Fluid & Electrolyte Disorders : Not present on admission     Gastroenterology : Not present on admission     Hematology/Oncology : Not present on admission     Nephrology : Not present on admission     Neurology : Not present on admission     Pulmonology : Not present on admission     Systemic : Not present on admission    Statement of review:  I have reviewed the lab findings, diagnostic data, medications, and the plan for sedation    Statement of review:  I have reviewed the lab findings, diagnostic data, medications, and the plan for sedation

## 2024-03-18 ENCOUNTER — TELEPHONE (OUTPATIENT)
Dept: CARDIOLOGY | Facility: CLINIC | Age: 69
End: 2024-03-18
Payer: MEDICARE

## 2024-03-18 NOTE — TELEPHONE ENCOUNTER
"----- Message from James Padilla MD sent at 3/13/2024  5:58 PM CDT -----  Rachel    It is ok to proceed with surgery and have the thyroid evaluated later.    Thank you  Toño      ----- Message -----  From: Kiana Durham RN  Sent: 3/13/2024   8:59 AM CDT  To: James Padilla MD    On his CT, it showed a right thyroid \"lesion\" measuring 4 x 3.5 cm. Do you want him to get this worked up prior to surgery with a CT or US? He is scheduled for surgery on April 2.    I will be off tomorrow and Friday, but Angeline and Edy will be covering for me. Message me back today and I will follow up.    Let me know, thank you!  Rachel      "

## 2024-03-26 ENCOUNTER — HOSPITAL ENCOUNTER (OUTPATIENT)
Dept: SURGERY | Facility: HOSPITAL | Age: 69
Discharge: HOME | End: 2024-03-26
Attending: SURGERY | Admitting: SURGERY
Payer: MEDICARE

## 2024-03-26 ENCOUNTER — TELEPHONE (OUTPATIENT)
Dept: CARDIOLOGY | Facility: CLINIC | Age: 69
End: 2024-03-26

## 2024-03-26 ENCOUNTER — ANESTHESIA EVENT (OUTPATIENT)
Dept: SURGERY | Facility: HOSPITAL | Age: 69
DRG: 220 | End: 2024-03-26
Payer: MEDICARE

## 2024-03-26 VITALS
HEIGHT: 70 IN | SYSTOLIC BLOOD PRESSURE: 137 MMHG | RESPIRATION RATE: 16 BRPM | OXYGEN SATURATION: 98 % | WEIGHT: 235.1 LBS | DIASTOLIC BLOOD PRESSURE: 95 MMHG | HEART RATE: 85 BPM | TEMPERATURE: 97.8 F | BODY MASS INDEX: 33.66 KG/M2

## 2024-03-26 DIAGNOSIS — R79.89 OTHER SPECIFIED ABNORMAL FINDINGS OF BLOOD CHEMISTRY: ICD-10-CM

## 2024-03-26 DIAGNOSIS — I99.8 OTHER DISORDER OF CIRCULATORY SYSTEM: ICD-10-CM

## 2024-03-26 DIAGNOSIS — I71.21 ANEURYSM OF ASCENDING AORTA WITHOUT RUPTURE (H): ICD-10-CM

## 2024-03-26 DIAGNOSIS — I51.89 OTHER ILL-DEFINED HEART DISEASES: ICD-10-CM

## 2024-03-26 LAB
ABO/RH(D): NORMAL
ALBUMIN UR-MCNC: NEGATIVE MG/DL
ANTIBODY SCREEN: NEGATIVE
APPEARANCE UR: CLEAR
APTT PPP: 29 SECONDS (ref 22–38)
BILIRUB UR QL STRIP: NEGATIVE
COLOR UR AUTO: NORMAL
GLUCOSE UR STRIP-MCNC: NEGATIVE MG/DL
HBA1C MFR BLD: 5.7 %
HGB UR QL STRIP: NEGATIVE
INR PPP: 0.98 (ref 0.85–1.15)
KETONES UR STRIP-MCNC: NEGATIVE MG/DL
LEUKOCYTE ESTERASE UR QL STRIP: NEGATIVE
MAGNESIUM SERPL-MCNC: 2 MG/DL (ref 1.7–2.3)
NITRATE UR QL: NEGATIVE
PH UR STRIP: 5 [PH] (ref 5–7)
PREALB SERPL-MCNC: 29.5 MG/DL (ref 20–40)
RBC URINE: 0 /HPF
SP GR UR STRIP: 1.02 (ref 1–1.03)
SPECIMEN EXPIRATION DATE: NORMAL
SQUAMOUS EPITHELIAL: 1 /HPF
UROBILINOGEN UR STRIP-MCNC: <2 MG/DL
WBC URINE: 1 /HPF

## 2024-03-26 PROCEDURE — 36415 COLL VENOUS BLD VENIPUNCTURE: CPT | Performed by: SURGERY

## 2024-03-26 PROCEDURE — 86900 BLOOD TYPING SEROLOGIC ABO: CPT | Performed by: SURGERY

## 2024-03-26 PROCEDURE — 81001 URINALYSIS AUTO W/SCOPE: CPT | Performed by: SURGERY

## 2024-03-26 PROCEDURE — 85610 PROTHROMBIN TIME: CPT | Performed by: SURGERY

## 2024-03-26 PROCEDURE — 83735 ASSAY OF MAGNESIUM: CPT | Performed by: SURGERY

## 2024-03-26 PROCEDURE — 85730 THROMBOPLASTIN TIME PARTIAL: CPT | Performed by: SURGERY

## 2024-03-26 PROCEDURE — 84134 ASSAY OF PREALBUMIN: CPT | Performed by: SURGERY

## 2024-03-26 PROCEDURE — 83036 HEMOGLOBIN GLYCOSYLATED A1C: CPT | Performed by: SURGERY

## 2024-03-26 RX ORDER — DEXMEDETOMIDINE HYDROCHLORIDE 4 UG/ML
.2-1 INJECTION, SOLUTION INTRAVENOUS CONTINUOUS
Status: CANCELLED | OUTPATIENT
Start: 2024-04-02

## 2024-03-26 RX ORDER — OMEGA-3/DHA/EPA/FISH OIL 60 MG-90MG
1000 CAPSULE ORAL DAILY
COMMUNITY

## 2024-03-26 NOTE — ANESTHESIA PREPROCEDURE EVALUATION
Anesthesia Pre-Procedure Evaluation    Patient: Bobby Nelson   MRN: [id number] : 1955        Procedure : Procedure(s):  Ascending aortic aneurysm replacement,  ANESTHESIA TRANSESOPHAGEAL ECHOCARDIOGRAM          Past Medical History:   Diagnosis Date    Asbestos exposure     Ascending aortic aneurysm (H24)     HLD (hyperlipidemia)     HTN (hypertension)     Thyroid nodule       Past Surgical History:   Procedure Laterality Date    CV CORONARY ANGIOGRAM N/A 2024    Procedure: Coronary Angiogram;  Surgeon: Jose Luis Jaffe MD;  Location: [facility] CATH LAB CV    CV LEFT HEART CATH N/A 2024    Procedure: Left Heart Catheterization;  Surgeon: Jose Luis Jaffe MD;  Location: [facility] CATH LAB CV    ELBOW SURGERY Right 2024    TONSILLECTOMY        No Known Allergies   Social History     Tobacco Use    Smoking status: Never    Smokeless tobacco: Never   Substance Use Topics    Alcohol use: Yes     Alcohol/week: 2.0 standard drinks of alcohol     Types: 2 Cans of beer per week      Wt Readings from Last 1 Encounters:   24 106.6 kg (235 lb 1.6 oz)        Anesthesia Evaluation   Pt has had prior anesthetic.     No history of anesthetic complications       ROS/MED HX  ENT/Pulmonary: Comment: H/o asbestos exposure      Neurologic:  - neg neurologic ROS     Cardiovascular: Comment:   Study Result    Narrative & Impression  EXAM: CTA CHEST ABDOMEN PELVIS W CONTRAST  LOCATION: [facility]  DATE: 3/12/2024     INDICATION:  Aneurysm of ascending aorta without rupture. Follow-up.  COMPARISON: Noncontrast chest CT on 2024  TECHNIQUE: CT angiogram chest abdomen pelvis during arterial phase of injection of IV contrast. 2D and 3D MIP reconstructions were performed by the CT technologist. Dose reduction techniques were used.   CONTRAST: 72 mL IV ISOVUE 370     FINDINGS:   CT ANGIOGRAM CHEST, ABDOMEN, AND PELVIS: Aneurysmal dilatation of the ascending thoracic aorta again seen  measuring 5.1 x 5.1 cm just below the level of the main pulmonary artery, stable. Descending thoracic aorta and abdominal aorta are normal in   caliber without aneurysmal dilatation. No dissection. Celiac artery, SMA, single left/2 right renal arteries and SALVATORE are patent. Mild ectasia of the left common iliac artery measuring 0.8 cm. Bilateral iliofemoral arteries are also patent without   dissection.        (+) Dyslipidemia hypertension- -   -  - -                                 Previous cardiac testing   Echo: Date: Results:    Stress Test:  Date: Results:    ECG Reviewed:  Date: Results:    Cath:  Date: Results:  Pre Procedure Diagnosis      pre-operative evaluation   Post Procedure Diagnosis      same    Conclusion    1.  Large-caliber normal-appearing epicardial coronary arteries in a right dominant system.  2.  LVEDP = 17 mmHg      METS/Exercise Tolerance:     Hematologic:  - neg hematologic  ROS     Musculoskeletal:  - neg musculoskeletal ROS     GI/Hepatic:  - neg GI/hepatic ROS     Renal/Genitourinary:       Endo:       Psychiatric/Substance Use:  - neg psychiatric ROS     Infectious Disease:  - neg infectious disease ROS     Malignancy:  - neg malignancy ROS     Other:            Physical Exam    Airway  airway exam normal      Mallampati: II   TM distance: > 3 FB   Neck ROM: limited   Mouth opening: > 3 cm    Respiratory Devices and Support         Dental  no notable dental history     (+) Modest Abnormalities - crowns, retainers, 1 or 2 missing teeth      Cardiovascular   cardiovascular exam normal       Rhythm and rate: regular and normal     Pulmonary   pulmonary exam normal        breath sounds clear to auscultation           OUTSIDE LABS:  CBC:   Lab Results   Component Value Date    WBC 6.3 03/12/2024    HGB 14.9 03/12/2024    HCT 45.7 03/12/2024     03/12/2024     BMP:   Lab Results   Component Value Date     03/12/2024     11/27/2023    POTASSIUM 4.1 03/12/2024    POTASSIUM 4.4  "11/27/2023    CHLORIDE 103 03/12/2024    CHLORIDE 104 11/27/2023    CO2 31 (H) 03/12/2024    CO2 28 11/27/2023    BUN 15.5 03/12/2024    BUN 15.9 11/27/2023    CR 0.92 03/12/2024    CR 0.92 11/27/2023    GLC 88 03/12/2024    GLC 90 11/27/2023     COAGS:   Lab Results   Component Value Date    PTT 29 03/26/2024    INR 0.98 03/26/2024     POC: No results found for: \"BGM\", \"HCG\", \"HCGS\"  HEPATIC:   Lab Results   Component Value Date    ALBUMIN 4.4 11/27/2023    PROTTOTAL 6.9 11/27/2023    ALT 36 11/27/2023    AST 46 (H) 11/27/2023    ALKPHOS 74 11/27/2023    BILITOTAL 0.8 11/27/2023     OTHER:   Lab Results   Component Value Date    A1C 5.7 (H) 03/26/2024    DERIAN 9.5 03/12/2024    MAG 2.0 03/26/2024       Anesthesia Plan    ASA Status:  3    NPO Status:  NPO Appropriate    Anesthesia Type: General.     - Airway: ETT   Induction: Intravenous.   Maintenance: Balanced.   Techniques and Equipment:     - Airway: Video-Laryngoscope     - Lines/Monitors: 2nd IV, Arterial Line, Central Line, PAC, CVP, LENNY            LENNY Absolute Contra-indication: NONE            LENNY Relative Contra-indication: NONE     - Blood: Blood in Room, PRBC, Cell Saver, PLT, Cryo, T&C, FFP     - Drips/Meds: Ketamine, Phenylephrine, Epinephrine, Nicardipine, Tranexamic acid, Dexmed. infusion     Consents    Anesthesia Plan(s) and associated risks, benefits, and realistic alternatives discussed. Questions answered and patient/representative(s) expressed understanding.     - Discussed: Risks, Benefits and Alternatives for BOTH SEDATION and the PROCEDURE were discussed     - Discussed with:  Patient      - Extended Intubation/Ventilatory Support Discussed: No.      - Patient is DNR/DNI Status: No     Use of blood products discussed: Yes.     - Discussed with: Patient.     Postoperative Care    Pain management: IV analgesics, Oral pain medications.     - Plan for long acting post-op opioid use   PONV prophylaxis: Ondansetron (or other 5HT-3), Dexamethasone " "or Solumedrol, Background Propofol Infusion     Comments:               Sorin Morales MD    I have reviewed the pertinent notes and labs in the chart from the past 30 days and (re)examined the patient.  Any updates or changes from those notes are reflected in this note.              # Obesity: Estimated body mass index is 33.73 kg/m  as calculated from the following:    Height as of 3/26/24: 1.778 m (5' 10\").    Weight as of 3/26/24: 106.6 kg (235 lb 1.6 oz).      "

## 2024-03-26 NOTE — PROGRESS NOTES
Pharmacist Admission Medication History    Admission medication history is complete. The information provided in this note is only as accurate as the sources available at the time of the update.    Information Source(s): Patient and CareEverywhere/SureScripts via in-person    Pertinent Information: Last administration dates to be entered on day of surgery. Patient was instructed to begin holding fish oil starting 3/27/24.    Allergies reviewed with patient and updates made in EHR: yes    Medication History Completed By: Aye Perez, ZenonD, [facility] 3/26/2024 9:14 AM      PTA Med List   Medication Sig Note Last Dose    fish oil-omega-3 fatty acids 500 MG capsule Take 1,000 mg by mouth daily 3/26/2024: Will hold starting 3/27/24 3/26/2024    losartan-hydrochlorothiazide (HYZAAR) 100-12.5 MG tablet Take 1 tablet by mouth daily      rosuvastatin (CRESTOR) 20 MG tablet Take 20 mg by mouth daily

## 2024-03-26 NOTE — TELEPHONE ENCOUNTER
Called pt spouse and left message requesting call back regarding recommendations Dr. Padilla has for her family. CV RN contact info provided.    ----- Message from James Padilla MD sent at 3/26/2024  9:17 AM CDT -----  Rachel    I have called twice now--once to the home number and once to her cell phone. I left a message on her cell and for first degree relatives, I recommended a transthoracic echocardiogram and an abdominal ultrasound to visualize the thoracic and abdominal aorta. They can get this through their primary care provider or I they can get a referral to a cardiologist. Can you try to contact Mrs. Nelson in case she needs clarification? I just need to know that she has had her questions answered.    Thank you   Toño  ----- Message -----  From: Kiana Durham RN  Sent: 3/12/2024   9:07 AM CDT  To: James Padilla MD    Hey there,    This pt's wife is wondering if you could call when you have a chance. He is the eitan with a 6.5 ascending, and is scheduled for surgery on 4/2. She had some additional questions about congenital/hereditary concerns I didn't feel totally equipped to answer, and she wasn't able to come to clinic with him when we met.    Do you mind calling her if/when you get a moment to go over considerations and recs for their son, in regards to aneurysms? I did mention he should probably get an echocardiogram at some point.    Thank you,  Rachel

## 2024-04-02 ENCOUNTER — APPOINTMENT (OUTPATIENT)
Dept: RADIOLOGY | Facility: HOSPITAL | Age: 69
DRG: 220 | End: 2024-04-02
Attending: PHYSICIAN ASSISTANT
Payer: MEDICARE

## 2024-04-02 ENCOUNTER — ANESTHESIA (OUTPATIENT)
Dept: SURGERY | Facility: HOSPITAL | Age: 69
DRG: 220 | End: 2024-04-02
Payer: MEDICARE

## 2024-04-02 ENCOUNTER — HOSPITAL ENCOUNTER (INPATIENT)
Facility: HOSPITAL | Age: 69
LOS: 4 days | Discharge: HOME | DRG: 220 | End: 2024-04-06
Attending: SURGERY | Admitting: SURGERY
Payer: MEDICARE

## 2024-04-02 ENCOUNTER — DOCUMENTATION ONLY (OUTPATIENT)
Dept: OTHER | Facility: CLINIC | Age: 69
End: 2024-04-02
Payer: MEDICARE

## 2024-04-02 DIAGNOSIS — I51.89 OTHER ILL-DEFINED HEART DISEASES: ICD-10-CM

## 2024-04-02 DIAGNOSIS — I71.21 ANEURYSM OF ASCENDING AORTA WITHOUT RUPTURE (H): ICD-10-CM

## 2024-04-02 DIAGNOSIS — Z98.890 S/P ASCENDING AORTIC ANEURYSM REPAIR: Primary | ICD-10-CM

## 2024-04-02 DIAGNOSIS — Z86.79 S/P ASCENDING AORTIC ANEURYSM REPAIR: Primary | ICD-10-CM

## 2024-04-02 LAB
ALBUMIN SERPL BCG-MCNC: 3.4 G/DL (ref 3.5–5.2)
ALP SERPL-CCNC: 72 U/L (ref 40–150)
ALT SERPL W P-5'-P-CCNC: 36 U/L (ref 0–70)
ANION GAP SERPL CALCULATED.3IONS-SCNC: 8 MMOL/L (ref 7–15)
APTT PPP: 41 SECONDS (ref 22–38)
APTT PPP: 43 SECONDS (ref 22–38)
AST SERPL W P-5'-P-CCNC: 54 U/L (ref 0–45)
BASE EXCESS BLDA CALC-SCNC: -0.3 MMOL/L (ref -3–3)
BASE EXCESS BLDA CALC-SCNC: -1.3 MMOL/L (ref -3–3)
BASE EXCESS BLDA CALC-SCNC: -1.4 MMOL/L (ref -3–3)
BASE EXCESS BLDA CALC-SCNC: -2.5 MMOL/L (ref -3–3)
BASE EXCESS BLDA CALC-SCNC: -4 MMOL/L (ref -3–3)
BASE EXCESS BLDA CALC-SCNC: 0.8 MMOL/L (ref -3–3)
BASE EXCESS BLDA CALC-SCNC: 1.2 MMOL/L (ref -3–3)
BASE EXCESS BLDV CALC-SCNC: 2.7 MMOL/L (ref -3–3)
BILIRUB SERPL-MCNC: 0.8 MG/DL
BLD PROD TYP BPU: NORMAL
BLD PROD TYP BPU: NORMAL
BLOOD COMPONENT TYPE: NORMAL
BLOOD COMPONENT TYPE: NORMAL
BUN SERPL-MCNC: 17.1 MG/DL (ref 8–23)
CA-I BLD-MCNC: 4.3 MG/DL (ref 4.4–5.2)
CA-I BLD-MCNC: 4.4 MG/DL (ref 4.4–5.2)
CA-I BLD-MCNC: 4.4 MG/DL (ref 4.4–5.2)
CA-I BLD-MCNC: 4.6 MG/DL (ref 4.4–5.2)
CA-I BLD-MCNC: 4.8 MG/DL (ref 4.4–5.2)
CA-I BLD-MCNC: 5 MG/DL (ref 4.4–5.2)
CALCIUM SERPL-MCNC: 8.3 MG/DL (ref 8.8–10.2)
CHLORIDE SERPL-SCNC: 111 MMOL/L (ref 98–107)
CODING SYSTEM: NORMAL
CODING SYSTEM: NORMAL
COHGB MFR BLD: 95.4 % (ref 95–96)
COHGB MFR BLD: 98.1 % (ref 95–96)
CPB POCT: NO
CREAT SERPL-MCNC: 0.89 MG/DL (ref 0.67–1.17)
CROSSMATCH: NORMAL
CROSSMATCH: NORMAL
DEPRECATED HCO3 PLAS-SCNC: 23 MMOL/L (ref 22–29)
EGFRCR SERPLBLD CKD-EPI 2021: >90 ML/MIN/1.73M2
ERYTHROCYTE [DISTWIDTH] IN BLOOD BY AUTOMATED COUNT: 12.7 % (ref 10–15)
ERYTHROCYTE [DISTWIDTH] IN BLOOD BY AUTOMATED COUNT: 12.7 % (ref 10–15)
FIBRINOGEN PPP-MCNC: 217 MG/DL (ref 170–490)
GLUCOSE BLD-MCNC: 106 MG/DL (ref 70–99)
GLUCOSE BLD-MCNC: 140 MG/DL (ref 70–99)
GLUCOSE BLD-MCNC: 160 MG/DL (ref 70–99)
GLUCOSE BLD-MCNC: 174 MG/DL (ref 70–99)
GLUCOSE BLD-MCNC: 186 MG/DL (ref 70–99)
GLUCOSE BLDC GLUCOMTR-MCNC: 117 MG/DL (ref 70–99)
GLUCOSE BLDC GLUCOMTR-MCNC: 125 MG/DL (ref 70–99)
GLUCOSE BLDC GLUCOMTR-MCNC: 128 MG/DL (ref 70–99)
GLUCOSE BLDC GLUCOMTR-MCNC: 129 MG/DL (ref 70–99)
GLUCOSE BLDC GLUCOMTR-MCNC: 136 MG/DL (ref 70–99)
GLUCOSE BLDC GLUCOMTR-MCNC: 138 MG/DL (ref 70–99)
GLUCOSE BLDC GLUCOMTR-MCNC: 142 MG/DL (ref 70–99)
GLUCOSE BLDC GLUCOMTR-MCNC: 142 MG/DL (ref 70–99)
GLUCOSE BLDC GLUCOMTR-MCNC: 153 MG/DL (ref 70–99)
GLUCOSE BLDC GLUCOMTR-MCNC: 159 MG/DL (ref 70–99)
GLUCOSE BLDC GLUCOMTR-MCNC: 191 MG/DL (ref 70–99)
GLUCOSE SERPL-MCNC: 184 MG/DL (ref 70–99)
HCO3 BLD-SCNC: 23 MMOL/L (ref 21–28)
HCO3 BLD-SCNC: 24 MMOL/L (ref 21–28)
HCO3 BLDA-SCNC: 22 MMOL/L (ref 21–28)
HCO3 BLDA-SCNC: 23 MMOL/L (ref 21–28)
HCO3 BLDA-SCNC: 24 MMOL/L (ref 21–28)
HCO3 BLDA-SCNC: 25 MMOL/L (ref 21–28)
HCO3 BLDA-SCNC: 25 MMOL/L (ref 21–28)
HCO3 BLDV-SCNC: 26 MMOL/L (ref 21–28)
HCT VFR BLD AUTO: 34.8 % (ref 40–53)
HCT VFR BLD AUTO: 38.8 % (ref 40–53)
HCT VFR BLD CALC: 36 % (ref 40–54)
HGB BLD-MCNC: 11.1 G/DL (ref 13.3–17.7)
HGB BLD-MCNC: 11.9 G/DL (ref 13.3–17.7)
HGB BLD-MCNC: 12.1 G/DL (ref 13.3–17.7)
HGB BLD-MCNC: 12.2 G/DL (ref 13.3–17.7)
HGB BLD-MCNC: 12.3 G/DL (ref 13.3–17.7)
HGB BLD-MCNC: 12.9 G/DL (ref 13.3–17.7)
HGB BLD-MCNC: 13 G/DL (ref 13.3–17.7)
HGB BLD-MCNC: 13.5 G/DL (ref 13.3–17.7)
INR PPP: 1.32 (ref 0.85–1.15)
INR PPP: 1.35 (ref 0.85–1.15)
ISSUE DATE AND TIME: NORMAL
ISSUE DATE AND TIME: NORMAL
LACTATE BLD-SCNC: 1.3 MMOL/L (ref 0.7–2)
LACTATE BLD-SCNC: 1.8 MMOL/L (ref 0.7–2)
LACTATE BLD-SCNC: 2 MMOL/L (ref 0.7–2)
LACTATE BLD-SCNC: 2.4 MMOL/L (ref 0.7–2)
LACTATE BLD-SCNC: 2.9 MMOL/L (ref 0.7–2)
LACTATE SERPL-SCNC: 2.1 MMOL/L (ref 0.7–2)
MAGNESIUM SERPL-MCNC: 2.9 MG/DL (ref 1.7–2.3)
MCH RBC QN AUTO: 30 PG (ref 26.5–33)
MCH RBC QN AUTO: 30.7 PG (ref 26.5–33)
MCHC RBC AUTO-ENTMCNC: 33.5 G/DL (ref 31.5–36.5)
MCHC RBC AUTO-ENTMCNC: 34.2 G/DL (ref 31.5–36.5)
MCV RBC AUTO: 90 FL (ref 78–100)
MCV RBC AUTO: 90 FL (ref 78–100)
O2/TOTAL GAS SETTING VFR VENT: 0 %
O2/TOTAL GAS SETTING VFR VENT: 21 %
OXYHGB MFR BLDA: 98 % (ref 92–100)
OXYHGB MFR BLDA: 98 % (ref 92–100)
OXYHGB MFR BLDA: 99 % (ref 92–100)
OXYHGB MFR BLDA: 99 % (ref 92–100)
OXYHGB MFR BLDV: 77 % (ref 70–75)
PCO2 BLD: 42 MM HG (ref 35–45)
PCO2 BLD: 43 MM HG (ref 35–45)
PCO2 BLDA: 42 MM HG (ref 35–45)
PCO2 BLDA: 47 MM HG (ref 35–45)
PCO2 BLDA: 48 MM HG (ref 35–45)
PCO2 BLDA: 49 MM HG (ref 35–45)
PCO2 BLDA: 49 MM HG (ref 35–45)
PCO2 BLDV: 57 MM HG (ref 40–50)
PEEP: 5 CM H2O
PH BLD: 7.34 [PH] (ref 7.35–7.45)
PH BLD: 7.37 [PH] (ref 7.35–7.45)
PH BLDA: 7.33 [PH] (ref 7.35–7.45)
PH BLDA: 7.34 [PH] (ref 7.35–7.45)
PH BLDA: 7.36 [PH] (ref 7.35–7.45)
PH BLDV: 7.31 [PH] (ref 7.32–7.43)
PHOSPHATE SERPL-MCNC: 2.1 MG/DL (ref 2.5–4.5)
PLATELET # BLD AUTO: 132 10E3/UL (ref 150–450)
PLATELET # BLD AUTO: 149 10E3/UL (ref 150–450)
PO2 BLD: 77 MM HG (ref 80–105)
PO2 BLD: 95 MM HG (ref 80–105)
PO2 BLDA: 352 MM HG (ref 80–105)
PO2 BLDA: 358 MM HG (ref 80–105)
PO2 BLDA: 366 MM HG (ref 80–105)
PO2 BLDA: 392 MM HG (ref 80–105)
PO2 BLDA: 77 MM HG (ref 80–105)
PO2 BLDV: 46 MM HG (ref 25–47)
POTASSIUM BLD-SCNC: 4.1 MMOL/L (ref 3.4–5.3)
POTASSIUM BLD-SCNC: 4.6 MMOL/L (ref 3.4–5.3)
POTASSIUM BLD-SCNC: 4.6 MMOL/L (ref 3.4–5.3)
POTASSIUM BLD-SCNC: 5.1 MMOL/L (ref 3.4–5.3)
POTASSIUM BLD-SCNC: 5.3 MMOL/L (ref 3.4–5.3)
POTASSIUM BLD-SCNC: 5.6 MMOL/L (ref 3.4–5.3)
POTASSIUM SERPL-SCNC: 4.6 MMOL/L (ref 3.4–5.3)
POTASSIUM SERPL-SCNC: 4.6 MMOL/L (ref 3.4–5.3)
PROT SERPL-MCNC: 5.2 G/DL (ref 6.4–8.3)
RBC # BLD AUTO: 3.87 10E6/UL (ref 4.4–5.9)
RBC # BLD AUTO: 4.33 10E6/UL (ref 4.4–5.9)
SAO2 % BLDA: 100 % (ref 95–96)
SAO2 % BLDA: 94 % (ref 92–100)
SAO2 % BLDA: 94 % (ref 92–100)
SAO2 % BLDA: 97 % (ref 92–100)
SAO2 % BLDV: 78 % (ref 70–75)
SODIUM BLD-SCNC: 140 MMOL/L (ref 135–145)
SODIUM BLD-SCNC: 140 MMOL/L (ref 135–145)
SODIUM BLD-SCNC: 141 MMOL/L (ref 135–145)
SODIUM BLD-SCNC: 142 MMOL/L (ref 135–145)
SODIUM BLD-SCNC: 143 MMOL/L (ref 135–145)
SODIUM BLD-SCNC: 143 MMOL/L (ref 135–145)
SODIUM SERPL-SCNC: 142 MMOL/L (ref 135–145)
UNIT ABO/RH: NORMAL
UNIT ABO/RH: NORMAL
UNIT NUMBER: NORMAL
UNIT NUMBER: NORMAL
UNIT STATUS: NORMAL
UNIT STATUS: NORMAL
UNIT TYPE ISBT: 5100
UNIT TYPE ISBT: 5100
WBC # BLD AUTO: 11.2 10E3/UL (ref 4–11)
WBC # BLD AUTO: 11.6 10E3/UL (ref 4–11)

## 2024-04-02 PROCEDURE — 85610 PROTHROMBIN TIME: CPT | Performed by: SURGERY

## 2024-04-02 PROCEDURE — 82805 BLOOD GASES W/O2 SATURATION: CPT | Performed by: NURSE PRACTITIONER

## 2024-04-02 PROCEDURE — 250N000009 HC RX 250: Performed by: SURGERY

## 2024-04-02 PROCEDURE — 999N000065 XR CHEST PORT 1 VIEW

## 2024-04-02 PROCEDURE — 370N000017 HC ANESTHESIA TECHNICAL FEE, PER MIN: Performed by: SURGERY

## 2024-04-02 PROCEDURE — 85730 THROMBOPLASTIN TIME PARTIAL: CPT | Performed by: SURGERY

## 2024-04-02 PROCEDURE — 84100 ASSAY OF PHOSPHORUS: CPT | Performed by: PHYSICIAN ASSISTANT

## 2024-04-02 PROCEDURE — 82803 BLOOD GASES ANY COMBINATION: CPT

## 2024-04-02 PROCEDURE — 250N000011 HC RX IP 250 OP 636: Performed by: ANESTHESIOLOGY

## 2024-04-02 PROCEDURE — 258N000003 HC RX IP 258 OP 636: Performed by: ANESTHESIOLOGY

## 2024-04-02 PROCEDURE — 250N000013 HC RX MED GY IP 250 OP 250 PS 637: Performed by: SURGERY

## 2024-04-02 PROCEDURE — 99291 CRITICAL CARE FIRST HOUR: CPT | Performed by: NURSE PRACTITIONER

## 2024-04-02 PROCEDURE — 410N000003 HC PER-PERFUSION 1ST 30 MIN: Performed by: SURGERY

## 2024-04-02 PROCEDURE — 86923 COMPATIBILITY TEST ELECTRIC: CPT | Performed by: SURGERY

## 2024-04-02 PROCEDURE — 272N000202 HC AEROBIKA WITH MANOMETER

## 2024-04-02 PROCEDURE — 82330 ASSAY OF CALCIUM: CPT | Performed by: PHYSICIAN ASSISTANT

## 2024-04-02 PROCEDURE — C1763 CONN TISS, NON-HUMAN: HCPCS | Performed by: SURGERY

## 2024-04-02 PROCEDURE — 83605 ASSAY OF LACTIC ACID: CPT | Performed by: PHYSICIAN ASSISTANT

## 2024-04-02 PROCEDURE — 410N000004: Performed by: SURGERY

## 2024-04-02 PROCEDURE — 250N000011 HC RX IP 250 OP 636: Performed by: SURGERY

## 2024-04-02 PROCEDURE — 272N000001 HC OR GENERAL SUPPLY STERILE: Performed by: SURGERY

## 2024-04-02 PROCEDURE — C1768 GRAFT, VASCULAR: HCPCS | Performed by: SURGERY

## 2024-04-02 PROCEDURE — 250N000013 HC RX MED GY IP 250 OP 250 PS 637: Performed by: PHYSICIAN ASSISTANT

## 2024-04-02 PROCEDURE — 200N000001 HC R&B ICU

## 2024-04-02 PROCEDURE — 85384 FIBRINOGEN ACTIVITY: CPT | Performed by: SURGERY

## 2024-04-02 PROCEDURE — 82330 ASSAY OF CALCIUM: CPT

## 2024-04-02 PROCEDURE — P9045 ALBUMIN (HUMAN), 5%, 250 ML: HCPCS | Mod: JZ | Performed by: ANESTHESIOLOGY

## 2024-04-02 PROCEDURE — 250N000009 HC RX 250: Performed by: PHYSICIAN ASSISTANT

## 2024-04-02 PROCEDURE — 94799 UNLISTED PULMONARY SVC/PX: CPT

## 2024-04-02 PROCEDURE — 85730 THROMBOPLASTIN TIME PARTIAL: CPT | Performed by: PHYSICIAN ASSISTANT

## 2024-04-02 PROCEDURE — 82805 BLOOD GASES W/O2 SATURATION: CPT | Performed by: PHYSICIAN ASSISTANT

## 2024-04-02 PROCEDURE — 258N000003 HC RX IP 258 OP 636: Performed by: SURGERY

## 2024-04-02 PROCEDURE — 83735 ASSAY OF MAGNESIUM: CPT | Performed by: PHYSICIAN ASSISTANT

## 2024-04-02 PROCEDURE — 85027 COMPLETE CBC AUTOMATED: CPT | Performed by: SURGERY

## 2024-04-02 PROCEDURE — 360N000079 HC SURGERY LEVEL 6, PER MIN: Performed by: SURGERY

## 2024-04-02 PROCEDURE — 94002 VENT MGMT INPAT INIT DAY: CPT

## 2024-04-02 PROCEDURE — 250N000025 HC SEVOFLURANE, PER MIN: Performed by: SURGERY

## 2024-04-02 PROCEDURE — 02RX0JZ REPLACEMENT OF THORACIC AORTA, ASCENDING/ARCH WITH SYNTHETIC SUBSTITUTE, OPEN APPROACH: ICD-10-PCS | Performed by: SURGERY

## 2024-04-02 PROCEDURE — 272N000004 HC RX 272: Performed by: SURGERY

## 2024-04-02 PROCEDURE — P9016 RBC LEUKOCYTES REDUCED: HCPCS | Performed by: SURGERY

## 2024-04-02 PROCEDURE — 5A1221Z PERFORMANCE OF CARDIAC OUTPUT, CONTINUOUS: ICD-10-PCS | Performed by: SURGERY

## 2024-04-02 PROCEDURE — 33268 EXCL LAA OPN OTH PX ANY METH: CPT | Performed by: SURGERY

## 2024-04-02 PROCEDURE — 999N000259 HC STATISTIC EXTUBATION

## 2024-04-02 PROCEDURE — 999N000253 HC STATISTIC WEANING TRIALS

## 2024-04-02 PROCEDURE — 999N000141 HC STATISTIC PRE-PROCEDURE NURSING ASSESSMENT: Performed by: SURGERY

## 2024-04-02 PROCEDURE — 33859 AS-AORT GRF F/DS OTH/THN DSJ: CPT | Performed by: SURGERY

## 2024-04-02 PROCEDURE — 999N000157 HC STATISTIC RCP TIME EA 10 MIN

## 2024-04-02 PROCEDURE — 250N000009 HC RX 250: Performed by: ANESTHESIOLOGY

## 2024-04-02 PROCEDURE — C1898 LEAD, PMKR, OTHER THAN TRANS: HCPCS | Performed by: SURGERY

## 2024-04-02 PROCEDURE — 88305 TISSUE EXAM BY PATHOLOGIST: CPT | Mod: TC | Performed by: SURGERY

## 2024-04-02 PROCEDURE — 85027 COMPLETE CBC AUTOMATED: CPT | Performed by: PHYSICIAN ASSISTANT

## 2024-04-02 PROCEDURE — 250N000011 HC RX IP 250 OP 636: Performed by: PHYSICIAN ASSISTANT

## 2024-04-02 PROCEDURE — 85610 PROTHROMBIN TIME: CPT | Performed by: PHYSICIAN ASSISTANT

## 2024-04-02 PROCEDURE — 258N000003 HC RX IP 258 OP 636: Performed by: PHYSICIAN ASSISTANT

## 2024-04-02 PROCEDURE — 02B70ZK EXCISION OF LEFT ATRIAL APPENDAGE, OPEN APPROACH: ICD-10-PCS | Performed by: SURGERY

## 2024-04-02 PROCEDURE — 84132 ASSAY OF SERUM POTASSIUM: CPT | Performed by: PHYSICIAN ASSISTANT

## 2024-04-02 DEVICE — GRAFT PERICARDIUM 8X14CM PERI-GUARD PG0814: Type: IMPLANTABLE DEVICE | Site: CHEST | Status: FUNCTIONAL

## 2024-04-02 DEVICE — HEMASHIELD PLATINUM WOVEN STRAIGHT DOUBLE VELOUR VASCULAR GRAFT WITH GRAFT SIZER ACCESSORY
Type: IMPLANTABLE DEVICE | Site: CHEST | Status: FUNCTIONAL
Brand: HEMASHIELD

## 2024-04-02 RX ORDER — CALCIUM GLUCONATE 20 MG/ML
2 INJECTION, SOLUTION INTRAVENOUS
Status: DISCONTINUED | OUTPATIENT
Start: 2024-04-02 | End: 2024-04-06 | Stop reason: HOSPADM

## 2024-04-02 RX ORDER — VASOPRESSIN IN 0.9 % NACL 2 UNIT/2ML
SYRINGE (ML) INTRAVENOUS PRN
Status: DISCONTINUED | OUTPATIENT
Start: 2024-04-02 | End: 2024-04-02

## 2024-04-02 RX ORDER — DEXAMETHASONE SODIUM PHOSPHATE 10 MG/ML
INJECTION, SOLUTION INTRAMUSCULAR; INTRAVENOUS PRN
Status: DISCONTINUED | OUTPATIENT
Start: 2024-04-02 | End: 2024-04-02

## 2024-04-02 RX ORDER — ACETAMINOPHEN 325 MG/1
975 TABLET ORAL EVERY 8 HOURS
Status: DISCONTINUED | OUTPATIENT
Start: 2024-04-02 | End: 2024-04-06 | Stop reason: HOSPADM

## 2024-04-02 RX ORDER — POLYETHYLENE GLYCOL 3350 17 G/17G
17 POWDER, FOR SOLUTION ORAL DAILY
Status: DISCONTINUED | OUTPATIENT
Start: 2024-04-03 | End: 2024-04-06 | Stop reason: HOSPADM

## 2024-04-02 RX ORDER — ASPIRIN 81 MG/1
81 TABLET, CHEWABLE ORAL DAILY
Status: DISCONTINUED | OUTPATIENT
Start: 2024-04-03 | End: 2024-04-06 | Stop reason: HOSPADM

## 2024-04-02 RX ORDER — OXYCODONE HYDROCHLORIDE 5 MG/1
10 TABLET ORAL EVERY 4 HOURS PRN
Status: DISCONTINUED | OUTPATIENT
Start: 2024-04-02 | End: 2024-04-06 | Stop reason: HOSPADM

## 2024-04-02 RX ORDER — AMOXICILLIN 250 MG
1 CAPSULE ORAL 2 TIMES DAILY
Status: DISCONTINUED | OUTPATIENT
Start: 2024-04-03 | End: 2024-04-06 | Stop reason: HOSPADM

## 2024-04-02 RX ORDER — DEXMEDETOMIDINE HYDROCHLORIDE 4 UG/ML
.2-1.2 INJECTION, SOLUTION INTRAVENOUS CONTINUOUS
Status: DISCONTINUED | OUTPATIENT
Start: 2024-04-02 | End: 2024-04-02

## 2024-04-02 RX ORDER — LIDOCAINE 40 MG/G
CREAM TOPICAL
Status: DISCONTINUED | OUTPATIENT
Start: 2024-04-02 | End: 2024-04-02

## 2024-04-02 RX ORDER — ONDANSETRON 4 MG/1
4 TABLET, ORALLY DISINTEGRATING ORAL EVERY 6 HOURS PRN
Status: DISCONTINUED | OUTPATIENT
Start: 2024-04-02 | End: 2024-04-06 | Stop reason: HOSPADM

## 2024-04-02 RX ORDER — ACETAMINOPHEN 325 MG/1
650 TABLET ORAL EVERY 4 HOURS PRN
Status: DISCONTINUED | OUTPATIENT
Start: 2024-04-05 | End: 2024-04-06 | Stop reason: HOSPADM

## 2024-04-02 RX ORDER — MAGNESIUM HYDROXIDE 1200 MG/15ML
LIQUID ORAL PRN
Status: DISCONTINUED | OUTPATIENT
Start: 2024-04-02 | End: 2024-04-02 | Stop reason: HOSPADM

## 2024-04-02 RX ORDER — MAGNESIUM SULFATE 4 G/50ML
4 INJECTION INTRAVENOUS ONCE
Status: COMPLETED | OUTPATIENT
Start: 2024-04-02 | End: 2024-04-02

## 2024-04-02 RX ORDER — NALOXONE HYDROCHLORIDE 0.4 MG/ML
0.2 INJECTION, SOLUTION INTRAMUSCULAR; INTRAVENOUS; SUBCUTANEOUS
Status: DISCONTINUED | OUTPATIENT
Start: 2024-04-02 | End: 2024-04-06 | Stop reason: HOSPADM

## 2024-04-02 RX ORDER — NITROGLYCERIN 10 MG/100ML
INJECTION INTRAVENOUS PRN
Status: DISCONTINUED | OUTPATIENT
Start: 2024-04-02 | End: 2024-04-02

## 2024-04-02 RX ORDER — NALOXONE HYDROCHLORIDE 0.4 MG/ML
0.4 INJECTION, SOLUTION INTRAMUSCULAR; INTRAVENOUS; SUBCUTANEOUS
Status: DISCONTINUED | OUTPATIENT
Start: 2024-04-02 | End: 2024-04-02

## 2024-04-02 RX ORDER — HEPARIN SODIUM 1000 [USP'U]/ML
INJECTION, SOLUTION INTRAVENOUS; SUBCUTANEOUS PRN
Status: DISCONTINUED | OUTPATIENT
Start: 2024-04-02 | End: 2024-04-02

## 2024-04-02 RX ORDER — ROSUVASTATIN CALCIUM 10 MG/1
20 TABLET, COATED ORAL DAILY
Status: DISCONTINUED | OUTPATIENT
Start: 2024-04-02 | End: 2024-04-02

## 2024-04-02 RX ORDER — PHENYLEPHRINE HCL IN 0.9% NACL 50MG/250ML
.1-6 PLASTIC BAG, INJECTION (ML) INTRAVENOUS CONTINUOUS
Status: DISCONTINUED | OUTPATIENT
Start: 2024-04-02 | End: 2024-04-02

## 2024-04-02 RX ORDER — PROPOFOL 10 MG/ML
INJECTION, EMULSION INTRAVENOUS PRN
Status: DISCONTINUED | OUTPATIENT
Start: 2024-04-02 | End: 2024-04-02

## 2024-04-02 RX ORDER — ROSUVASTATIN CALCIUM 10 MG/1
20 TABLET, COATED ORAL DAILY
Status: DISCONTINUED | OUTPATIENT
Start: 2024-04-03 | End: 2024-04-06 | Stop reason: HOSPADM

## 2024-04-02 RX ORDER — HYDROMORPHONE HCL IN WATER/PF 6 MG/30 ML
0.4 PATIENT CONTROLLED ANALGESIA SYRINGE INTRAVENOUS
Status: DISCONTINUED | OUTPATIENT
Start: 2024-04-02 | End: 2024-04-04

## 2024-04-02 RX ORDER — SODIUM CHLORIDE, SODIUM GLUCONATE, SODIUM ACETATE, POTASSIUM CHLORIDE AND MAGNESIUM CHLORIDE 526; 502; 368; 37; 30 MG/100ML; MG/100ML; MG/100ML; MG/100ML; MG/100ML
1000 INJECTION, SOLUTION INTRAVENOUS
Status: DISCONTINUED | OUTPATIENT
Start: 2024-04-02 | End: 2024-04-02

## 2024-04-02 RX ORDER — LIDOCAINE 40 MG/G
CREAM TOPICAL
Status: DISCONTINUED | OUTPATIENT
Start: 2024-04-03 | End: 2024-04-02

## 2024-04-02 RX ORDER — NICOTINE POLACRILEX 4 MG
15-30 LOZENGE BUCCAL
Status: DISCONTINUED | OUTPATIENT
Start: 2024-04-02 | End: 2024-04-06 | Stop reason: HOSPADM

## 2024-04-02 RX ORDER — FENTANYL CITRATE 50 UG/ML
INJECTION, SOLUTION INTRAMUSCULAR; INTRAVENOUS PRN
Status: DISCONTINUED | OUTPATIENT
Start: 2024-04-02 | End: 2024-04-02

## 2024-04-02 RX ORDER — BISACODYL 10 MG
10 SUPPOSITORY, RECTAL RECTAL DAILY PRN
Status: DISCONTINUED | OUTPATIENT
Start: 2024-04-02 | End: 2024-04-06 | Stop reason: HOSPADM

## 2024-04-02 RX ORDER — HYDROMORPHONE HCL IN WATER/PF 6 MG/30 ML
0.2 PATIENT CONTROLLED ANALGESIA SYRINGE INTRAVENOUS
Status: DISCONTINUED | OUTPATIENT
Start: 2024-04-02 | End: 2024-04-04

## 2024-04-02 RX ORDER — PANTOPRAZOLE SODIUM 40 MG/1
40 TABLET, DELAYED RELEASE ORAL DAILY
Status: DISCONTINUED | OUTPATIENT
Start: 2024-04-03 | End: 2024-04-06 | Stop reason: HOSPADM

## 2024-04-02 RX ORDER — CHLORHEXIDINE GLUCONATE ORAL RINSE 1.2 MG/ML
10 SOLUTION DENTAL ONCE
Status: COMPLETED | OUTPATIENT
Start: 2024-04-02 | End: 2024-04-02

## 2024-04-02 RX ORDER — NALOXONE HYDROCHLORIDE 0.4 MG/ML
0.4 INJECTION, SOLUTION INTRAMUSCULAR; INTRAVENOUS; SUBCUTANEOUS
Status: DISCONTINUED | OUTPATIENT
Start: 2024-04-02 | End: 2024-04-06 | Stop reason: HOSPADM

## 2024-04-02 RX ORDER — OXYCODONE HYDROCHLORIDE 5 MG/1
5 TABLET ORAL EVERY 4 HOURS PRN
Status: DISCONTINUED | OUTPATIENT
Start: 2024-04-02 | End: 2024-04-06 | Stop reason: HOSPADM

## 2024-04-02 RX ORDER — CHLORHEXIDINE GLUCONATE ORAL RINSE 1.2 MG/ML
15 SOLUTION DENTAL EVERY 12 HOURS
Status: DISCONTINUED | OUTPATIENT
Start: 2024-04-02 | End: 2024-04-02

## 2024-04-02 RX ORDER — LIDOCAINE HYDROCHLORIDE 10 MG/ML
INJECTION, SOLUTION INFILTRATION; PERINEURAL
Status: COMPLETED | OUTPATIENT
Start: 2024-04-02 | End: 2024-04-02

## 2024-04-02 RX ORDER — ACETAMINOPHEN 650 MG/1
650 SUPPOSITORY RECTAL EVERY 8 HOURS
Qty: 9 SUPPOSITORY | Refills: 0 | Status: DISCONTINUED | OUTPATIENT
Start: 2024-04-02 | End: 2024-04-02

## 2024-04-02 RX ORDER — MAGNESIUM SULFATE 4 G/50ML
4 INJECTION INTRAVENOUS ONCE
Status: DISCONTINUED | OUTPATIENT
Start: 2024-04-03 | End: 2024-04-02

## 2024-04-02 RX ORDER — PROTAMINE SULFATE 10 MG/ML
INJECTION, SOLUTION INTRAVENOUS PRN
Status: DISCONTINUED | OUTPATIENT
Start: 2024-04-02 | End: 2024-04-02

## 2024-04-02 RX ORDER — HYDRALAZINE HYDROCHLORIDE 20 MG/ML
10 INJECTION INTRAMUSCULAR; INTRAVENOUS EVERY 30 MIN PRN
Status: DISCONTINUED | OUTPATIENT
Start: 2024-04-02 | End: 2024-04-06 | Stop reason: HOSPADM

## 2024-04-02 RX ORDER — DEXMEDETOMIDINE HYDROCHLORIDE 4 UG/ML
.2-.7 INJECTION, SOLUTION INTRAVENOUS CONTINUOUS
Status: DISCONTINUED | OUTPATIENT
Start: 2024-04-02 | End: 2024-04-03

## 2024-04-02 RX ORDER — CALCIUM GLUCONATE 20 MG/ML
1 INJECTION, SOLUTION INTRAVENOUS
Status: DISCONTINUED | OUTPATIENT
Start: 2024-04-02 | End: 2024-04-06 | Stop reason: HOSPADM

## 2024-04-02 RX ORDER — NITROGLYCERIN 5 MG/ML
VIAL (ML) INTRAVENOUS
Status: DISCONTINUED
Start: 2024-04-02 | End: 2024-04-02 | Stop reason: HOSPADM

## 2024-04-02 RX ORDER — HEPARIN SODIUM 5000 [USP'U]/.5ML
5000 INJECTION, SOLUTION INTRAVENOUS; SUBCUTANEOUS EVERY 8 HOURS
Status: DISCONTINUED | OUTPATIENT
Start: 2024-04-03 | End: 2024-04-06 | Stop reason: HOSPADM

## 2024-04-02 RX ORDER — DEXTROSE MONOHYDRATE 25 G/50ML
25-50 INJECTION, SOLUTION INTRAVENOUS
Status: DISCONTINUED | OUTPATIENT
Start: 2024-04-02 | End: 2024-04-06 | Stop reason: HOSPADM

## 2024-04-02 RX ORDER — ONDANSETRON 2 MG/ML
4 INJECTION INTRAMUSCULAR; INTRAVENOUS EVERY 6 HOURS PRN
Status: DISCONTINUED | OUTPATIENT
Start: 2024-04-02 | End: 2024-04-06 | Stop reason: HOSPADM

## 2024-04-02 RX ORDER — PROCHLORPERAZINE MALEATE 5 MG
5 TABLET ORAL EVERY 6 HOURS PRN
Status: DISCONTINUED | OUTPATIENT
Start: 2024-04-02 | End: 2024-04-06 | Stop reason: HOSPADM

## 2024-04-02 RX ORDER — CEFAZOLIN SODIUM/WATER 2 G/20 ML
2 SYRINGE (ML) INTRAVENOUS
Status: COMPLETED | OUTPATIENT
Start: 2024-04-02 | End: 2024-04-02

## 2024-04-02 RX ORDER — CEFAZOLIN SODIUM/WATER 2 G/20 ML
2 SYRINGE (ML) INTRAVENOUS SEE ADMIN INSTRUCTIONS
Status: DISCONTINUED | OUTPATIENT
Start: 2024-04-02 | End: 2024-04-02

## 2024-04-02 RX ORDER — LIDOCAINE HYDROCHLORIDE 10 MG/ML
INJECTION, SOLUTION INFILTRATION; PERINEURAL PRN
Status: DISCONTINUED | OUTPATIENT
Start: 2024-04-02 | End: 2024-04-02

## 2024-04-02 RX ORDER — LIDOCAINE 4 G/G
1-2 PATCH TOPICAL EVERY 24 HOURS
Status: DISCONTINUED | OUTPATIENT
Start: 2024-04-02 | End: 2024-04-06 | Stop reason: HOSPADM

## 2024-04-02 RX ORDER — METHADONE HYDROCHLORIDE 10 MG/ML
INJECTION, SOLUTION INTRAMUSCULAR; INTRAVENOUS; SUBCUTANEOUS PRN
Status: DISCONTINUED | OUTPATIENT
Start: 2024-04-02 | End: 2024-04-02

## 2024-04-02 RX ORDER — SODIUM CHLORIDE 9 MG/ML
INJECTION, SOLUTION INTRAVENOUS CONTINUOUS PRN
Status: DISCONTINUED | OUTPATIENT
Start: 2024-04-02 | End: 2024-04-02

## 2024-04-02 RX ORDER — ASPIRIN 300 MG/1
300 SUPPOSITORY RECTAL DAILY
Status: DISCONTINUED | OUTPATIENT
Start: 2024-04-03 | End: 2024-04-03

## 2024-04-02 RX ORDER — NALOXONE HYDROCHLORIDE 0.4 MG/ML
0.2 INJECTION, SOLUTION INTRAMUSCULAR; INTRAVENOUS; SUBCUTANEOUS
Status: DISCONTINUED | OUTPATIENT
Start: 2024-04-02 | End: 2024-04-02

## 2024-04-02 RX ORDER — ONDANSETRON 2 MG/ML
INJECTION INTRAMUSCULAR; INTRAVENOUS PRN
Status: DISCONTINUED | OUTPATIENT
Start: 2024-04-02 | End: 2024-04-02

## 2024-04-02 RX ORDER — METHADONE HYDROCHLORIDE 10 MG/ML
INJECTION, SOLUTION INTRAMUSCULAR; INTRAVENOUS; SUBCUTANEOUS
Status: DISCONTINUED
Start: 2024-04-02 | End: 2024-04-02 | Stop reason: HOSPADM

## 2024-04-02 RX ORDER — SODIUM CHLORIDE, SODIUM LACTATE, POTASSIUM CHLORIDE, CALCIUM CHLORIDE 600; 310; 30; 20 MG/100ML; MG/100ML; MG/100ML; MG/100ML
INJECTION, SOLUTION INTRAVENOUS CONTINUOUS
Status: DISCONTINUED | OUTPATIENT
Start: 2024-04-03 | End: 2024-04-02

## 2024-04-02 RX ORDER — VANCOMYCIN HYDROCHLORIDE 1 G/20ML
INJECTION, POWDER, LYOPHILIZED, FOR SOLUTION INTRAVENOUS PRN
Status: DISCONTINUED | OUTPATIENT
Start: 2024-04-02 | End: 2024-04-02 | Stop reason: HOSPADM

## 2024-04-02 RX ORDER — CEFAZOLIN SODIUM 2 G/100ML
2 INJECTION, SOLUTION INTRAVENOUS EVERY 8 HOURS
Qty: 60 ML | Refills: 0 | Status: COMPLETED | OUTPATIENT
Start: 2024-04-02 | End: 2024-04-03

## 2024-04-02 RX ORDER — PHENYLEPHRINE HCL IN 0.9% NACL 50MG/250ML
.1-4 PLASTIC BAG, INJECTION (ML) INTRAVENOUS CONTINUOUS PRN
Status: DISCONTINUED | OUTPATIENT
Start: 2024-04-02 | End: 2024-04-04

## 2024-04-02 RX ADMIN — PHENYLEPHRINE HYDROCHLORIDE 100 MCG: 10 INJECTION INTRAVENOUS at 07:51

## 2024-04-02 RX ADMIN — PHENYLEPHRINE HYDROCHLORIDE 150 MCG: 10 INJECTION INTRAVENOUS at 08:42

## 2024-04-02 RX ADMIN — SODIUM CHLORIDE, POTASSIUM CHLORIDE, SODIUM LACTATE AND CALCIUM CHLORIDE 250 ML: 600; 310; 30; 20 INJECTION, SOLUTION INTRAVENOUS at 19:11

## 2024-04-02 RX ADMIN — PHENYLEPHRINE HYDROCHLORIDE 100 MCG: 10 INJECTION INTRAVENOUS at 07:44

## 2024-04-02 RX ADMIN — SODIUM CHLORIDE, POTASSIUM CHLORIDE, SODIUM LACTATE AND CALCIUM CHLORIDE 250 ML: 600; 310; 30; 20 INJECTION, SOLUTION INTRAVENOUS at 15:23

## 2024-04-02 RX ADMIN — Medication 2 G: at 08:14

## 2024-04-02 RX ADMIN — SODIUM CHLORIDE, POTASSIUM CHLORIDE, SODIUM LACTATE AND CALCIUM CHLORIDE 250 ML: 600; 310; 30; 20 INJECTION, SOLUTION INTRAVENOUS at 14:40

## 2024-04-02 RX ADMIN — ROCURONIUM BROMIDE 50 MG: 50 INJECTION, SOLUTION INTRAVENOUS at 07:36

## 2024-04-02 RX ADMIN — VANCOMYCIN HYDROCHLORIDE 1500 MG: 5 INJECTION, POWDER, LYOPHILIZED, FOR SOLUTION INTRAVENOUS at 07:10

## 2024-04-02 RX ADMIN — SUGAMMADEX 200 MG: 100 INJECTION, SOLUTION INTRAVENOUS at 12:34

## 2024-04-02 RX ADMIN — SODIUM CHLORIDE, POTASSIUM CHLORIDE, SODIUM LACTATE AND CALCIUM CHLORIDE 250 ML: 600; 310; 30; 20 INJECTION, SOLUTION INTRAVENOUS at 20:30

## 2024-04-02 RX ADMIN — SODIUM CHLORIDE, POTASSIUM CHLORIDE, SODIUM LACTATE AND CALCIUM CHLORIDE: 600; 310; 30; 20 INJECTION, SOLUTION INTRAVENOUS at 06:30

## 2024-04-02 RX ADMIN — NITROGLYCERIN 10 MCG: 10 INJECTION INTRAVENOUS at 11:48

## 2024-04-02 RX ADMIN — HYDROMORPHONE HYDROCHLORIDE 0.4 MG: 0.2 INJECTION, SOLUTION INTRAMUSCULAR; INTRAVENOUS; SUBCUTANEOUS at 13:13

## 2024-04-02 RX ADMIN — LIDOCAINE HYDROCHLORIDE 2 ML: 10 INJECTION, SOLUTION INFILTRATION; PERINEURAL at 07:25

## 2024-04-02 RX ADMIN — MIDAZOLAM 2 MG: 1 INJECTION INTRAMUSCULAR; INTRAVENOUS at 07:27

## 2024-04-02 RX ADMIN — SODIUM CHLORIDE, POTASSIUM CHLORIDE, SODIUM LACTATE AND CALCIUM CHLORIDE 250 ML: 600; 310; 30; 20 INJECTION, SOLUTION INTRAVENOUS at 23:00

## 2024-04-02 RX ADMIN — CEFAZOLIN SODIUM 2 G: 2 INJECTION, SOLUTION INTRAVENOUS at 16:44

## 2024-04-02 RX ADMIN — NICARDIPINE HYDROCHLORIDE 0.5 MG/HR: 0.2 INJECTION, SOLUTION INTRAVENOUS at 14:16

## 2024-04-02 RX ADMIN — PHENYLEPHRINE HYDROCHLORIDE 100 MCG: 10 INJECTION INTRAVENOUS at 08:47

## 2024-04-02 RX ADMIN — PHENYLEPHRINE HYDROCHLORIDE 200 MCG: 10 INJECTION INTRAVENOUS at 11:31

## 2024-04-02 RX ADMIN — AMINOCAPROIC ACID 1 G/HR: 250 INJECTION, SOLUTION INTRAVENOUS at 09:25

## 2024-04-02 RX ADMIN — PHENYLEPHRINE HYDROCHLORIDE 100 MCG: 10 INJECTION INTRAVENOUS at 11:24

## 2024-04-02 RX ADMIN — Medication 20 MG: at 07:35

## 2024-04-02 RX ADMIN — DEXAMETHASONE SODIUM PHOSPHATE 10 MG: 10 INJECTION, SOLUTION INTRAMUSCULAR; INTRAVENOUS at 07:36

## 2024-04-02 RX ADMIN — PHENYLEPHRINE HYDROCHLORIDE 100 MCG: 10 INJECTION INTRAVENOUS at 08:45

## 2024-04-02 RX ADMIN — ROCURONIUM BROMIDE 20 MG: 50 INJECTION, SOLUTION INTRAVENOUS at 11:54

## 2024-04-02 RX ADMIN — DEXMEDETOMIDINE HYDROCHLORIDE 0.5 MCG/KG/HR: 100 INJECTION, SOLUTION INTRAVENOUS at 08:40

## 2024-04-02 RX ADMIN — SODIUM CHLORIDE, POTASSIUM CHLORIDE, SODIUM LACTATE AND CALCIUM CHLORIDE 250 ML: 600; 310; 30; 20 INJECTION, SOLUTION INTRAVENOUS at 17:52

## 2024-04-02 RX ADMIN — PHENYLEPHRINE HYDROCHLORIDE 0.3 MCG/KG/MIN: 10 INJECTION INTRAVENOUS at 08:39

## 2024-04-02 RX ADMIN — HEPARIN SODIUM 35000 UNITS: 1000 INJECTION INTRAVENOUS; SUBCUTANEOUS at 08:34

## 2024-04-02 RX ADMIN — ONDANSETRON 4 MG: 2 INJECTION INTRAMUSCULAR; INTRAVENOUS at 12:34

## 2024-04-02 RX ADMIN — SODIUM CHLORIDE: 9 INJECTION, SOLUTION INTRAVENOUS at 09:31

## 2024-04-02 RX ADMIN — SODIUM CHLORIDE: 9 INJECTION, SOLUTION INTRAVENOUS at 11:51

## 2024-04-02 RX ADMIN — ACETAMINOPHEN 975 MG: 325 TABLET ORAL at 13:28

## 2024-04-02 RX ADMIN — LIDOCAINE HYDROCHLORIDE 50 MG: 10 INJECTION, SOLUTION INFILTRATION; PERINEURAL at 07:36

## 2024-04-02 RX ADMIN — FENTANYL CITRATE 50 MCG: 50 INJECTION INTRAMUSCULAR; INTRAVENOUS at 07:27

## 2024-04-02 RX ADMIN — PHENYLEPHRINE HYDROCHLORIDE 50 MCG: 10 INJECTION INTRAVENOUS at 07:39

## 2024-04-02 RX ADMIN — PROPOFOL 150 MG: 10 INJECTION, EMULSION INTRAVENOUS at 07:36

## 2024-04-02 RX ADMIN — PHENYLEPHRINE HYDROCHLORIDE 100 MCG: 10 INJECTION INTRAVENOUS at 11:27

## 2024-04-02 RX ADMIN — ACETAMINOPHEN 975 MG: 325 TABLET ORAL at 19:43

## 2024-04-02 RX ADMIN — PHENYLEPHRINE HYDROCHLORIDE 100 MCG: 10 INJECTION INTRAVENOUS at 07:40

## 2024-04-02 RX ADMIN — MAGNESIUM SULFATE HEPTAHYDRATE 4 G: 80 INJECTION, SOLUTION INTRAVENOUS at 06:30

## 2024-04-02 RX ADMIN — PHENYLEPHRINE HYDROCHLORIDE 100 MCG: 10 INJECTION INTRAVENOUS at 08:56

## 2024-04-02 RX ADMIN — ROCURONIUM BROMIDE 30 MG: 50 INJECTION, SOLUTION INTRAVENOUS at 11:03

## 2024-04-02 RX ADMIN — SODIUM CHLORIDE, POTASSIUM CHLORIDE, SODIUM LACTATE AND CALCIUM CHLORIDE 250 ML: 600; 310; 30; 20 INJECTION, SOLUTION INTRAVENOUS at 21:00

## 2024-04-02 RX ADMIN — INSULIN HUMAN 1 UNITS/HR: 1 INJECTION, SOLUTION INTRAVENOUS at 14:02

## 2024-04-02 RX ADMIN — FENTANYL CITRATE 50 MCG: 50 INJECTION INTRAMUSCULAR; INTRAVENOUS at 08:28

## 2024-04-02 RX ADMIN — SODIUM CHLORIDE: 9 INJECTION, SOLUTION INTRAVENOUS at 08:10

## 2024-04-02 RX ADMIN — CEFAZOLIN SODIUM 2 G: 2 INJECTION, SOLUTION INTRAVENOUS at 23:48

## 2024-04-02 RX ADMIN — CHLORHEXIDINE GLUCONATE 10 ML: 1.2 SOLUTION ORAL at 06:26

## 2024-04-02 RX ADMIN — DESMOPRESSIN ACETATE 31.72 MCG: 4 INJECTION, SOLUTION INTRAVENOUS; SUBCUTANEOUS at 11:21

## 2024-04-02 RX ADMIN — Medication 1 UNITS: at 07:55

## 2024-04-02 RX ADMIN — SODIUM PHOSPHATE, MONOBASIC, MONOHYDRATE AND SODIUM PHOSPHATE, DIBASIC, ANHYDROUS 15 MMOL: 142; 276 INJECTION, SOLUTION INTRAVENOUS at 14:40

## 2024-04-02 RX ADMIN — METOPROLOL TARTRATE 12.5 MG: 25 TABLET, FILM COATED ORAL at 06:23

## 2024-04-02 RX ADMIN — SODIUM CHLORIDE, POTASSIUM CHLORIDE, SODIUM LACTATE AND CALCIUM CHLORIDE 250 ML: 600; 310; 30; 20 INJECTION, SOLUTION INTRAVENOUS at 13:13

## 2024-04-02 RX ADMIN — PHENYLEPHRINE HYDROCHLORIDE 100 MCG: 10 INJECTION INTRAVENOUS at 08:48

## 2024-04-02 RX ADMIN — SODIUM CHLORIDE, POTASSIUM CHLORIDE, SODIUM LACTATE AND CALCIUM CHLORIDE 250 ML: 600; 310; 30; 20 INJECTION, SOLUTION INTRAVENOUS at 22:00

## 2024-04-02 RX ADMIN — DEXMEDETOMIDINE HYDROCHLORIDE 0.3 MCG/KG/HR: 100 INJECTION, SOLUTION INTRAVENOUS at 08:10

## 2024-04-02 RX ADMIN — SODIUM CHLORIDE, POTASSIUM CHLORIDE, SODIUM LACTATE AND CALCIUM CHLORIDE: 600; 310; 30; 20 INJECTION, SOLUTION INTRAVENOUS at 08:37

## 2024-04-02 RX ADMIN — PROTAMINE SULFATE 300 MG: 10 INJECTION, SOLUTION INTRAVENOUS at 11:25

## 2024-04-02 RX ADMIN — ALBUMIN HUMAN: 0.05 INJECTION, SOLUTION INTRAVENOUS at 07:56

## 2024-04-02 RX ADMIN — SODIUM CHLORIDE 7.5 G: 900 INJECTION, SOLUTION INTRAVENOUS at 08:08

## 2024-04-02 RX ADMIN — SODIUM CHLORIDE, POTASSIUM CHLORIDE, SODIUM LACTATE AND CALCIUM CHLORIDE 250 ML: 600; 310; 30; 20 INJECTION, SOLUTION INTRAVENOUS at 13:27

## 2024-04-02 RX ADMIN — VANCOMYCIN HYDROCHLORIDE 1500 MG: 5 INJECTION, POWDER, LYOPHILIZED, FOR SOLUTION INTRAVENOUS at 19:42

## 2024-04-02 RX ADMIN — LIDOCAINE 1 PATCH: 4 PATCH TOPICAL at 14:24

## 2024-04-02 RX ADMIN — DEXMEDETOMIDINE HYDROCHLORIDE 0.3 MCG/KG/HR: 400 INJECTION INTRAVENOUS at 14:12

## 2024-04-02 ASSESSMENT — ACTIVITIES OF DAILY LIVING (ADL)
ADLS_ACUITY_SCORE: 18
ADLS_ACUITY_SCORE: 18
ADLS_ACUITY_SCORE: 30
ADLS_ACUITY_SCORE: 24
ADLS_ACUITY_SCORE: 18
ADLS_ACUITY_SCORE: 30
ADLS_ACUITY_SCORE: 18
ADLS_ACUITY_SCORE: 26
ADLS_ACUITY_SCORE: 26
ADLS_ACUITY_SCORE: 18
ADLS_ACUITY_SCORE: 18
ADLS_ACUITY_SCORE: 30
ADLS_ACUITY_SCORE: 24
ADLS_ACUITY_SCORE: 24
ADLS_ACUITY_SCORE: 18
ADLS_ACUITY_SCORE: 24

## 2024-04-02 NOTE — PHARMACY-ADMISSION MEDICATION HISTORY
Pharmacist Admission Medication History    Admission medication history is complete. The information provided in this note is only as accurate as the sources available at the time of the update.    Information Source(s): Patient via in-person    Pertinent Information: Patient has been holding his fish oil for a week prior to procedure.    Changes made to PTA medication list:  Added: None  Deleted: None  Changed: None    Allergies reviewed with patient and updates made in EHR: yes    Medication History Completed By: JHONNY BENNETT RPH 4/2/2024 6:30 AM    PTA Med List   Medication Sig Note Last Dose    fish oil-omega-3 fatty acids 500 MG capsule Take 1,000 mg by mouth daily 3/26/2024: Will hold starting 3/27/24 Past Week at am    losartan-hydrochlorothiazide (HYZAAR) 100-12.5 MG tablet Take 1 tablet by mouth daily  4/1/2024 at am    rosuvastatin (CRESTOR) 20 MG tablet Take 20 mg by mouth daily  4/1/2024 at am

## 2024-04-02 NOTE — ANESTHESIA PROCEDURE NOTES
Perioperative LENNY Procedure Note    Staff -        Anesthesiologist:  Sorin Morales MD       Performed By: anesthesiologist  Preanesthesia Checklist:  Patient identified, IV assessed, risks and benefits discussed, monitors and equipment assessed, procedure being performed at surgeon's request and anesthesia consent obtained.    LENNY Probe Insertion    Probe Status PRE Insertion: NO obvious damage  Probe type:  Adult 3D  Bite block used:   Soft  Insertion Technique: Easy, no oropharyngeal manipulation  Insertion complications: None obvious  Billing Report:A LENNY report is NOT being generated.  Probe Status POST Removal: NO obvious damage  Comments: [phone]

## 2024-04-02 NOTE — ANESTHESIA POSTPROCEDURE EVALUATION
Patient: Bobby Nelson    Procedure: Procedure(s):  ASCENDING AORTIC ANEURYSM REPLACEMENT, LEFT ATRIAL APPENDAGE EXCISION  ANESTHESIA TRANSESOPHAGEAL ECHOCARDIOGRAM       Anesthesia Type:  General    Note:  Disposition: ICU            ICU Sign Out: Anesthesiologist/ICU physician sign out WAS performed   Postop Pain Control: Uneventful            Sign Out: Well controlled pain   PONV: No   Neuro/Psych: Uneventful            Sign Out: Acceptable/Baseline neuro status   Airway/Respiratory: Uneventful            Sign Out: AIRWAY IN SITU/Resp. Support   CV/Hemodynamics: Uneventful            Sign Out: Acceptable CV status; No obvious hypovolemia; No obvious fluid overload   Other NRE: NONE   DID A NON-ROUTINE EVENT OCCUR? No           Last vitals:  Vitals:    04/02/24 1445 04/02/24 1500 04/02/24 1515   BP:      Pulse: 80 80 80   Resp:      Temp:      SpO2: 98% 98% 98%       Electronically Signed By: Sorin Morales MD  April 2, 2024  3:37 PM

## 2024-04-02 NOTE — BRIEF OP NOTE
[facility]    Brief Operative Note    Pre-operative diagnosis: Aneurysm of ascending aorta without rupture (H24) [I71.21]  Other ill-defined heart diseases [I51.89]  Post-operative diagnosis Same as pre-operative diagnosis    Procedure: ASCENDING AORTIC ANEURYSM REPLACEMENT, LEFT ATRIAL APPENDAGE EXCISION, N/A - Chest  ANESTHESIA TRANSESOPHAGEAL ECHOCARDIOGRAM, N/A - Heart    Surgeon: Surgeon(s) and Role:     * James Padilla MD - Primary     * Sebastián Hernandez MD - Assisting  Anesthesia: General   Estimated Blood Loss: 500 ml    Drains: 2 mediastinal   Specimens:   ID Type Source Tests Collected by Time Destination   1 : Aorta Tissue Aorta SURGICAL PATHOLOGY EXAM James Padilla MD 4/2/2024  9:31 AM    2 : Left Atrial Appendage Tissue Heart SURGICAL PATHOLOGY EXAM James Padilla MD 4/2/2024  9:31 AM      Findings:   Large aneurysm with friable tissue. See op note for details .  Complications: None.  Implants:   Implant Name Type Inv. Item Serial No.  Lot No. LRB No. Used Action   GRAFT PERICARDIUM [id number] EDWIN-GUARD [id number] - [id number] Bone/Tissue/Biologic GRAFT PERICARDIUM [id number] EDWIN-GUARD [id number]  [employer] [id number] N/A 1 Implanted   GRAFT HEMASHIELD PLATINUM [id number] - [id number] Graft GRAFT HEMASHIELD PLATINUM [id number] [employer] [id number] N/A 1 Implanted           Sebastián Hernandez MD  Cardiothoracic Surgery Fellow  Pager: [phone]

## 2024-04-02 NOTE — ANESTHESIA PROCEDURE NOTES
Arterial Line Procedure Note    Pre-Procedure   Staff -        Anesthesiologist:  Sorin Morales MD       Performed By: anesthesiologist       Location: OR       Pre-Anesthestic Checklist: patient identified, IV checked, risks and benefits discussed, informed consent, monitors and equipment checked, pre-op evaluation and at physician/surgeon's request  Timeout:       Correct Patient: Yes        Correct Procedure: Yes        Correct Site: Yes        Correct Position: Yes   Line Placement:   This line was placed Pre Induction starting at 4/2/2024 7:25 AM and ending at 4/2/2024 7:32 AM  Procedure   Procedure: arterial line, new line and elective       Laterality: right       Insertion Site: brachial.  Sterile Prep        Standard elements of sterile barrier followed       Skin prep: Chloraprep  Insertion/Injection        Technique: ultrasound guided and Seldinger Technique        1. Ultrasound was used to evaluate the access site.       2. Artery evaluated via ultrasound for patency/adequacy.       3. Using real-time ultrasound the needle/catheter was observed entering the artery/vein.       4. Permanent image was captured and entered into the patient's record.       5. The visualized structures were anatomically normal.       6. There were no apparent abnormal pathologic findings.       Catheter Type/Size: 20 G, 12 cm  Narrative         Secured by: suture       Biopatch and Tegaderm dressing used.       Complications: None apparent,        Arterial waveform: Yes    Comments:  Radial site deemed too small for instruments provided in arterial line kit for safe access

## 2024-04-02 NOTE — OP NOTE
DATE OF SERVICE: 4/2/2024.     PREOPERATIVE DIAGNOSES:  Ascending aortic aneurysm.    POSTOPERATIVE DIAGNOSES:  Ascending aortic aneurysm.      PROCEDURE PERFORMED:   Ascending aortic replacement with a 32 mm Gelweave graft from the sinotubular junction to the distal ascending aorta.  Left atrial appendage excision.     SURGEON:  James Padilla MD, PhD.     RESIDENT SURGEON: Sebastián Hernandez MD.     FIRST ASSISTANT:  Johanna Castro, surgical first assistant/surgical technician.     ANESTHESIA:  General endotracheal anesthesia.     ANESTHESIOLOGIST:  Toño Morales MD.     ESTIMATED BLOOD LOSS:  500 mL     SPECIMEN:  Aortic valve cusps.     INDICATIONS FOR PROCEDURE: Mr. Nelson is a 68-year-old man with an ascending aortic aneurysm measuring 5.4 cm in the mid ascending aorta. I discussed the natural history and complications of thoracic aortic aneurysms, including the risk of rupture or dissection. I recommend ascending aortic replacement to repair the aneurysm in lieu of ACC/AHA Guidelines. The sinuses of Valsalva look to be 4 cm or less on the non-contrast CT and the distal ascending aorta at the base of the innominate artery is <4cm  as well. The patient understands the risks and benefits of the procedure and wishes to undergo the operation.     OPERATIVE FINDINGS:  The aortic valve trileaflet, and the cusps were fairly normal appearing with minimal sclerosis. The left main and right coronary ostia were in the usual anatomic position. After reperfusion, sinus bradycardia resumed.  Left ventricular function was normal preoperatively and unchanged after bypass on low-dose inotropic support. There was mild aortic regurgitation preoperatively and this was unchanged postoperatively.     OPERATIVE DESCRIPTION IN DETAIL:  After obtaining informed consent, the patient was brought to the operating room and placed in the supine position on the operating room table.  Appropriate lines and devices for monitoring were placed by  the anesthesia team.  The patient underwent smooth induction of general anesthesia, and the trachea was intubated orally.  A right internal jugular Cordis introducer was placed, and a [place]-Pushpa catheter was placed through this.  The patient was prepped and draped, and a timeout was performed to confirm the correct patient identity, as well as the procedure to be performed.      A median sternotomy was performed.  The patient was given full dose heparin, and after cannulation of the aortic arch and the right atrium, cardiopulmonary bypass was commenced. In addition to a left ventricular vent placed through the right superior pulmonary vein, antegrade and retrograde cardioplegia cannulae were placed, and the heart was arrested with 1 liter of cold retrograde blood cardioplegi. An additional dose of one liter of cold handheld blood cardioplegia was given after 20 minutes. Subsequent maintenance doses of 300 mL of cold retrograde blood cardioplegia were given approximately every 20 minutes.  Topical cold saline slush was applied and the patient was cooled to 32 degrees Celsius for additional protection.    Left atrial appendage excision was performed and the defect was closed with running 3-0 Prolene; the first layer was running horizontal mattress and the second layer was running continuous     The ascending aorta was excised from the aortic cross clamp to the sinotubular junction, and the aortic valve was examined with the above findings noted. The proximal anastomosis of the sinotubular junction to the 32 mm Gelweave graft was constructed in end-to-end fashion with running 4-0 Prolene buttressing the aorta with bovine pericardial strips along the intima and the adventitia. The Gelweave graft was cut to length, and the distal anastomosis was completed in end-to-end fashion with running 4-0 Prolene buttressing the aorta with bovine pericardial strips along the intima and the adventitia. An antegrade cardioplegia  "cannula/aortic root vent was placed in the mid portion of the Gelweave graft. A does of warm retrograde blood cardioplegia (\"hotshot\") was delivered through the retrograde cannula, and the left heart was de-aired. The aortic cross clamp was released and the heart was resuscitated.      All bleeding points were controlled.  A bipolar ventricular pacing lead was placed and brought out through a separate stab incision. A bipolar right atrial pacing lead was placed and brought out through a separate stab incision.  The patient weaned from cardiopulmonary bypass, was given protamine and decannulated.  A 24-Green[place]ic Brandt drain was placed in the pericardial space and brought out through a separate stab incision.  A 28-[place] chest tube was placed in the anterior mediastinum and brought out through a separate stab incision.  The sternal edges were reapposed with 3 interrupted #6 stainless steel sternal wires in the manubrium, 3 double wires in the body of the sternum and 2 additional #6 stainless steel sternal wires at the lower aspect of the sternum.  The muscle, fascia, and skin were closed in layers with absorbable suture.  Dermabond was applied.  All sponge, needle and instrument counts were correct at the end of the case.      James Padilla MD PhD    "

## 2024-04-02 NOTE — CONSULTS
PULMONARY / CRITICAL CARE CONSULTATION NOTE      Consultation - Pulmonary/Critical Care Medicine  Bobby Nelson,  1955, MRN [id number]  Date / Time of Admission:  2024  5:25 AM    Admitting Dx: Aneurysm of ascending aorta without rupture (H24) [I71.21]  Other ill-defined heart diseases [I51.89]    PCP: Casa Garrett, [phone]  Consulting physician:  James Padilla*   Code status:  Full Code       Extended Emergency Contact Information  Primary Emergency Contact: Terri Nelson  Work Phone: [phone]  Mobile Phone: [phone]  Relation: Spouse  Secondary Emergency Contact: Bobbi Marsh  Home Phone: [phone]  Relation: Daughter       ID:  Bobby Nelson is a 68 year old male with Ascending aortic aneurysm. He was admitted today for replacement with Dr. Padilla.         ASSESSMENT & PLAN BY SYSTEM   Systems to Assess:   Pulmonary: Post op vent management; no underlying lung disease. Should be able to fast track extubation.   Wean supplemental O2 as tolerated; goal O2 sat > 92%.  HOB > 30 degrees to limit aspiration risk.    Check ABG and adjust support as indicated; avoid acidotic state.   Check CXR  Once hemodynamically stable, plan to proceed to wake, wean, and extubate with goal < 6-hours.  Goal extubation by 18:25  Cardiovascular: Ascending aortic aneurysm s/p replacement with Dr. Padilla   Cardiac monitoring.   SBP goal > 90 mmHg, MAP goal > 65 mmHg.    Goal CI 2-3   Goal CVP teens  Vasoactive gtts per CV-surgery.   Temporary pacing wires present; will use in setting of symptomatic arrhythmia.   Currently paced @ AV 80  Underlying rhythm: calvin  Neurological: sedation for vent synchrony and comfort.    Neuro checks per ICU protocol.   Sedate with precedex until ready to wean and extubate.   Pain control: PRN  Goal RASS 0 to -1   GI/: no acute issues   NPO until extubated and fully awake.   Cano catheter in place for accurate measurement of I/O.   GI prophylaxis:   Omeprazole  Renal: no acute issues   Monitor I/O's.  Electrolyte repletion PRN.  Avoid/limit nephrotoxic agents.   IVFs: per CV-surgery  Heme/Coag: Acute blood loss anemia; coagulopathy secondary to pump run.   Monitor H/H.   Transfuse per CV-surgery.   Monitor chest tube output hourly; notify CV-surgery for excessive output or abrupt stop in output.   DVT prophylaxis:  SubQ heparin  Infectious disease:   General precautions.   Remove lines and drains once no longer required.   Endocrine: hyperglycemia   RHI gtt per ICU protocol.   Musculoskeletal:   Bedrest; initiate mobility protocol.     Lines: A-line, Day# 1, Central line, Day# 1 or [place]one Pushpa, Day# 1      Code Status:  FULL CODE    Thank you for this consultation.  Please call if any questions or concerns.        This patient is considered critically ill and requires ICU level of care due to immediate post CV-surgical procedure. High risk for acute hemodynamic collapse and death.     Total Critical Care time, not including separate billable procedure time:  33 minutes.    Nani Weber, CNP  [facility] Pulmonary/Critical Care      Chief Complaint chief complaint       HPI    Bobby Nelson is a 68 year old male with Ascending aortic aneurysm; he is otherwise a rather healthy and active man. He was admitted today for replacement with Dr. Padilla.  Procedure reported as straight forward. Airway was easy and there were no difficulties with line placement. No difficulty going on nor coming off pump. Was noted to be a bit hypotensive throughout the case requiring phenylephrine throughout. EBL 500mL; received 400ml Cell Saver blood and a dose of DDAVP. No bleeding concerns. He arrived to ICU intubated and sedated on precedex.      Review of Systems   Review of systems not obtained due to patient factors.     Active Problem List   Patient Active Problem List    Diagnosis Date Noted    Aneurysm of ascending aorta without rupture (H24) 03/14/2024      Priority: Medium    Status post coronary angiogram 03/14/2024     Priority: Medium    Other ill-defined heart diseases 03/14/2024     Priority: Medium         Medical/Surgical History   Past Medical History:   Diagnosis Date    Asbestos exposure     Ascending aortic aneurysm (H24)     HLD (hyperlipidemia)     HTN (hypertension)     Thyroid nodule      Past Surgical History:   Procedure Laterality Date    CV CORONARY ANGIOGRAM N/A 03/14/2024    Procedure: Coronary Angiogram;  Surgeon: Jose Luis Jaffe MD;  Location: [facility] CV    CV LEFT HEART CATH N/A 03/14/2024    Procedure: Left Heart Catheterization;  Surgeon: Jose Luis Jaffe MD;  Location: [facility] CV    ELBOW SURGERY Right 03/01/2024    TONSILLECTOMY           Allergies   No Known Allergies      Medications:  OUTpatient medications   Prior to Admission medications    Medication Sig Start Date End Date Taking? Authorizing Provider   fish oil-omega-3 fatty acids 500 MG capsule Take 1,000 mg by mouth daily   Yes Unknown, Entered By History   losartan-hydrochlorothiazide (HYZAAR) 100-12.5 MG tablet Take 1 tablet by mouth daily   Yes Reported, Patient   rosuvastatin (CRESTOR) 20 MG tablet Take 20 mg by mouth daily   Yes Reported, Patient           Medications:  INpatient medications   Current Facility-Administered Medications   Medication Dose Route Frequency Provider Last Rate Last Admin    acetaminophen (TYLENOL) Suppository 650 mg  650 mg Rectal Q8H Sherron Cantu PA-C        acetaminophen (TYLENOL) tablet 975 mg  975 mg Oral Q8H Sherron Cantu PA-C        [START ON 4/3/2024] aspirin (ASA) chewable tablet 81 mg  81 mg Oral or NG Tube Daily Sherron Cantu PA-C        Or    [START ON 4/3/2024] aspirin (ASA) Suppository 300 mg  300 mg Rectal Daily Sherron Cantu PA-C        ceFAZolin (ANCEF) 2 g in 100 mL D5W intermittent infusion  2 g Intravenous Q8H Sherron Cantu PA-C        chlorhexidine  (PERIDEX) 0.12 % solution 15 mL  15 mL Mouth/Throat Q12H Nani Weber APRN CNP        [START ON 4/3/2024] heparin ANTICOAGULANT injection 5,000 Units  5,000 Units Subcutaneous Q8H Sherron Cantu PA-C        Lidocaine (LIDOCARE) 4 % Patch 1-2 patch  1-2 patch Transdermal Q24H Sherron Cantu PA-C        [START ON 4/3/2024] pantoprazole (PROTONIX) 2 mg/mL suspension 40 mg  40 mg Oral or NG Tube Daily Sherron Cantu PA-C        Or    [START ON 4/3/2024] pantoprazole (PROTONIX) EC tablet 40 mg  40 mg Oral Daily Sherron Cantu PA-C        [START ON 4/3/2024] polyethylene glycol (MIRALAX) Packet 17 g  17 g Oral Daily Sherron Cantu PA-C        [START ON 4/3/2024] rosuvastatin (CRESTOR) tablet 20 mg  20 mg Oral Daily James Padilla MD        [START ON 4/3/2024] senna-docusate (SENOKOT-S/PERICOLACE) 8.6-50 MG per tablet 1 tablet  1 tablet Oral BID Sherron Cantu PA-C        vancomycin (VANCOCIN) 1,500 mg in sodium chloride 0.9 % 250 mL intermittent infusion  1,500 mg Intravenous Q12H Sherron Cantu PA-C               Family History  Social History   Reviewed  No family history on file.  Reviewed  Social History     Socioeconomic History    Marital status:      Spouse name: Not on file    Number of children: Not on file    Years of education: Not on file    Highest education level: Not on file   Occupational History    Not on file   Tobacco Use    Smoking status: Never    Smokeless tobacco: Never   Substance and Sexual Activity    Alcohol use: Yes     Alcohol/week: 2.0 standard drinks of alcohol     Types: 2 Cans of beer per week    Drug use: Never    Sexual activity: Not on file   Other Topics Concern    Not on file   Social History Narrative    Not on file     Social Determinants of Health     Financial Resource Strain: Not on file   Food Insecurity: Not on file   Transportation Needs: Not on file   Physical Activity: Not on file  "  Stress: Not on file   Social Connections: Not on file   Interpersonal Safety: Not on file   Housing Stability: Not on file      Psychosocial Needs  Social History     Social History Narrative    Not on file     Additional psychosocial needs reviewed per nursing assessment.      Physical Exam   VITALS:  BP (!) 130/92 (BP Location: Right arm)   Pulse 79   Temp 98.5  F (36.9  C) (Oral)   Resp 18   Ht 1.778 m (5' 10\")   Wt 106.6 kg (235 lb)   SpO2 98%   BMI 33.72 kg/m         PHYSICAL EXAM:   GEN: intubated and sedated  HEENT:  OETT in place; AT/NC  NECK: Supple.  RIJ introducer with double lumen catheter  PULM:  unlabored on full vent; lungs clear and equal. No wheeze  CVS:   100% AV Paced. S1S2; chest tubes in place   ABDOMEN:   soft ntnd  EXTREMITIES/SKIN:    visible skin intact  NEURO:  .  sedated    I&O:    Intake/Output Summary (Last 24 hours) at 4/2/2024 1325  Last data filed at 4/2/2024 1209  Gross per 24 hour   Intake 3200 ml   Output 1071 ml   Net 2129 ml        Pertinent Labs   Lab Results: personally reviewed.      CBC:  Recent Labs   Lab 04/02/24  1308 04/02/24  1238 04/02/24  1129   WBC  --  11.6* 11.2*   HGB 12.2* 13.0* 11.9*   HCT 36* 38.8* 34.8*   PLT  --  149* 132*     Chemistry:  Recent Labs   Lab 04/02/24  1308 04/02/24  1238 04/02/24  1128    142 142   CO2  --  23  --    BUN  --  17.1  --    ALBUMIN  --  3.4*  --    ALT  --  36  --    AST  --  54*  --    ALKPHOS  --  72  --      Coags:  Lab Results   Component Value Date    INR 1.32 (H) 04/02/2024    INR 1.35 (H) 04/02/2024    INR 0.98 03/26/2024        Microbiology:  None this admission       Radiology:  Chest X-Ray: post op film not yet done.     "

## 2024-04-02 NOTE — PROGRESS NOTES
"Received pt from CV surgery and placed on mechanical ventilation, VC AC mode 18, 560, 5, 1.0. SpO2 98 %, FiO2 decreased to .90.     Vent Mode: CMV/AC  (Continuous Mandatory Ventilation/ Assist Control)  FiO2 (%): 100 % (decreased to 90)  Resp Rate (Set): 18 breaths/min  Tidal Volume (Set, mL): 560 mL  PEEP (cm H2O): 5 cmH2O  Resp: 18    BP (!) 130/92 (BP Location: Right arm)   Pulse 79   Temp 98.5  F (36.9  C) (Oral)   Resp 18   Ht 1.778 m (5' 10\")   Wt 106.6 kg (235 lb)   SpO2 98%   BMI 33.72 kg/m      RT to monitor    "

## 2024-04-02 NOTE — ANESTHESIA CARE TRANSFER NOTE
Patient: Bobby Nelson    Procedure: Procedure(s):  ASCENDING AORTIC ANEURYSM REPLACEMENT, LEFT ATRIAL APPENDAGE EXCISION  ANESTHESIA TRANSESOPHAGEAL ECHOCARDIOGRAM       Diagnosis: Aneurysm of ascending aorta without rupture (H24) [I71.21]  Other ill-defined heart diseases [I51.89]  Diagnosis Additional Information: No value filed.    Anesthesia Type:   General     Note:    Oropharynx: endotracheal tube in place, oral gastic tube in place and ventilatory support  Level of Consciousness: unresponsive  Patient oxygen source: AMBU.  Level of Supplemental Oxygen (L/min / FiO2): 15      Vital Signs Stable: post-procedure vital signs reviewed and stable  Report to RN Given: handoff report given  Patient transferred to: ICU  Comments: Pt. Transported to ICU monitored on bed by surgical team, PAULINO and Dr. Morales respirations via AMBU.   ICU Handoff: Call for PAUSE to initiate/utilize ICU HANDOFF, Identified Patient, Identified Responsible Provider, Reviewed the Pertinent Medical History, Discussed Surgical Course, Reviewed Intra-OP Anesthesia Management and Issues during Anesthesia, Set Expectations for Post Procedure Period and Allowed Opportunity for Questions and Acknowledgement of Understanding      Vitals:  Vitals Value Taken Time   BP     Temp     Pulse 79 04/02/24 1236   Resp     SpO2 98 % 04/02/24 1236   Vitals shown include unfiled device data.    Electronically Signed By: SYLVESTER Arnold CRNA  April 2, 2024  12:38 PM

## 2024-04-02 NOTE — ANESTHESIA PROCEDURE NOTES
Airway       Patient location during procedure: OR       Procedure Start/Stop Times: 4/2/2024 7:36 AM and 4/2/2024 7:36 AM  Staff -        CRNA: Mar Parnell APRN CRNA       Performed By: CRNA  Consent for Airway        Urgency: elective  Indications and Patient Condition       Indications for airway management: nancy-procedural        Final Airway Details       Final airway type: endotracheal airway       Successful airway: Single subglottic suction  Endotracheal Airway Details        ETT size (mm): 7.5       Successful intubation technique: video laryngoscopy       VL Blade Size: Glidescope 4       Grade View of Cords: 1       Adjucts: stylet       Position: Right       Measured from: gums/teeth       Secured at (cm): 23       Bite block used: None    Post intubation assessment        Placement verified by: capnometry, equal breath sounds and chest rise        Number of attempts at approach: 1       Number of other approaches attempted: 0       Secured with: tape       Ease of procedure: easy       Dentition: Intact    Medication(s) Administered   Medication Administration Time: 4/2/2024 7:36 AM

## 2024-04-02 NOTE — H&P
H&P Update    The patient has no new complaints and there are no new exam findings. The H&P is up to date. Please see the note below for details.    James Padilla MD    Cardiac and Thoracic Surgery Consultation      Bobby Nelson MRN# [id number]   YOB: 1955 Age: 68 year old   Date of Admission:      (Not on file)     Reason for consult: I was asked by Dr. Casa Garrett to evaluate this patient for an ascending aortic aneuyrsm.           Assessment and Plan:   Mr. Nelson is a 68-year-old man with an ascending aortic aneurysm measuring 5.4 cm in the mid ascending aorta. I discussed the natural history and complications of thoracic aortic aneurysms, including the risk of rupture or dissection. I recommend ascending aortic replacement to repair the aneurysm in lieu of ACC/AHA Guidelines. The sinuses of Valsalva look to be 4 cm or less on the non-contrast CT and the distal ascending aorta at the base of the innominate artery is <4cm  as well. These measurements will need to be confirmed. Preoperatively, he will need a CTA chest/abdomen/pelvis, coronary angiography, transthoracic echocardiography, carotid US, and dental clearance.      I described the technical details, as well as the expected postoperative course and recovery to the patient. I also discussed the risks and benefits of the procedure. The risks include but are not limited to bleeding, infection, stroke, heart failure, dysrhythmia, respiratory failure, kidney or liver injury, bowel or limb ischemia, and death. The estimated risk of mortality is 1-2%, and the estimated risk of major morbidity or mortality is <10%. The patient understands these risks and wishes to undergo the operation.      Surgery will be scheduled in the coming months.             Chief Complaint:   Mr. Nelson is a 68-year-old man with an ascending aortic aneurysm measuring 5.4 cm in the mid ascending aorta.     History is obtained from the patient           History of Present Illness:   This patient is a 68 year old male who presents to discuss treatment of his ascending aortic aneurysm measuring 5.4 cm in the mid ascending aorta. He is followed by Dr. Casa Garrett at [facility], and  CT chest was obtained for screening purposes. He was noted to have the ascending thoracic aortic aneurysm. He does not have a family history of aneurysms, aortic dissection, or sudden death. 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 Unfortunately, he suffered a right upper extremity fracture recently and had surgery for that, but he is making a nice recovery.                  Past Medical History:   Past Medical History   No past medical history on file.             Past Surgical History:   Past Surgical History   No past surgical history on file.               Social History:      Social History           Tobacco Use    Smoking status: Not on file    Smokeless tobacco: Not on file   Substance Use Topics    Alcohol use: Not on file              Family History:   Family History   No family history on file.             Immunizations:           Immunization History   Administered Date(s) Administered    COVID-19 Monovalent 18+ (Moderna) 03/10/2021, 04/07/2021              Allergies:   Not on File          Medications:      No current [facility]-ordered outpatient medications on file.      No current [facility]-ordered facility-administered medications on file.              Review of Systems:      The 10 point Review of Systems is negative other than noted in the HPI            Physical Exam:   Vitals were reviewed      BP: (!) 162/100 Pulse: 64   Resp: 18        Constitutional:    awake, alert, cooperative, no apparent distress, and appears stated age      Eyes:    Lids and lashes normal, pupils equal, round and reactive to light, extra ocular muscles intact, sclera clear, conjunctiva normal      ENT:    normocephalic, without obvious abnormality       Neck:    supple, symmetrical, trachea midline      Lungs:    no increased work of breathing, good air exchange, and no retractions      Cardiovascular:    regular rate and rhythm      Abdomen:    non-distended      Musculoskeletal:    no lower extremity pitting edema present  there is no redness, warmth, or swelling of the joints  full range of motion noted  motor strength is 5 out of 5 all extremities bilaterally      Neurologic:    Mental Status Exam:  Level of Alertness:   awake  Orientation:   person, place, time  Cranial Nerves:  cranial nerves II-XII are grossly intact  Motor Exam:  moves all extremities well and symmetrically      Neuropsychiatric:    General: normal, calm, and normal eye contact  Level of consciousness: alert / normal  Affect: normal      Skin:    no bruising or bleeding, normal skin color, texture, turgor, and no redness, warmth, or swelling           Data:   All laboratory data reviewed  All cardiac studies reviewed by me.  All imaging studies reviewed by me.

## 2024-04-02 NOTE — ANESTHESIA PROCEDURE NOTES
Central Line/PA Catheter Placement    Pre-Procedure   Staff -        Anesthesiologist:  Sorin Morales MD       Performed By: anesthesiologist       Location: OR       Pre-Anesthestic Checklist: patient identified, IV checked, site marked, risks and benefits discussed, informed consent, monitors and equipment checked, pre-op evaluation and at physician/surgeon's request  Timeout:       Correct Patient: Yes        Correct Procedure: Yes        Correct Site: Yes        Correct Position: Yes        Correct Laterality: Yes   Line Placement:   This line was placed Post Induction starting at 4/2/2024 7:40 AM and ending at 4/2/2024 7:50 AM    Procedure   Procedure: central line and elective       Laterality: right       Insertion Site: internal jugular.       Patient Position: Trendelenburg  Sterile Prep        All elements of maximal sterile barrier technique followed       Patient Prep/Sterile Barriers: draped, hand hygiene, gloves , hat , mask , draped, gown, sterile gel and probe cover       Skin prep: Chloraprep  Insertion/Injection        Technique: ultrasound guided and Seldinger Technique        1. Ultrasound was used to evaluate the access site.       2. Vein evaluated via ultrasound for patency/adequacy.       3. Using real-time ultrasound the needle/catheter was observed entering the artery/vein.       4. Permanent image was captured and entered into the patient's record.       5. The visualized structures were anatomically normal.       6. There were no apparent abnormal pathologic findings.       Introducer Type: 9 Fr, 2-lumen MAC        Type: Introducer  Narrative         Secured by: suture and anchor securement device       Tegaderm and Biopatch dressing used.       Complications: None apparent,        blood aspirated from all lumens,        All lumens flushed: Yes       Verification method: Ultrasound and Placement to be verified post-op

## 2024-04-03 ENCOUNTER — APPOINTMENT (OUTPATIENT)
Dept: RADIOLOGY | Facility: HOSPITAL | Age: 69
DRG: 220 | End: 2024-04-03
Attending: PHYSICIAN ASSISTANT
Payer: MEDICARE

## 2024-04-03 ENCOUNTER — APPOINTMENT (OUTPATIENT)
Dept: OCCUPATIONAL THERAPY | Facility: HOSPITAL | Age: 69
DRG: 220 | End: 2024-04-03
Attending: SURGERY
Payer: MEDICARE

## 2024-04-03 ENCOUNTER — APPOINTMENT (OUTPATIENT)
Dept: OCCUPATIONAL THERAPY | Facility: HOSPITAL | Age: 69
DRG: 220 | End: 2024-04-03
Attending: PHYSICIAN ASSISTANT
Payer: MEDICARE

## 2024-04-03 LAB
ALBUMIN SERPL BCG-MCNC: 3.2 G/DL (ref 3.5–5.2)
ALP SERPL-CCNC: 56 U/L (ref 40–150)
ALT SERPL W P-5'-P-CCNC: 31 U/L (ref 0–70)
ANION GAP SERPL CALCULATED.3IONS-SCNC: 8 MMOL/L (ref 7–15)
AST SERPL W P-5'-P-CCNC: 51 U/L (ref 0–45)
ATRIAL RATE - MUSE: 69 BPM
BASE EXCESS BLDA CALC-SCNC: 0.2 MMOL/L (ref -3–3)
BILIRUB SERPL-MCNC: 0.7 MG/DL
BUN SERPL-MCNC: 20.2 MG/DL (ref 8–23)
CA-I BLD-MCNC: 4.6 MG/DL (ref 4.4–5.2)
CALCIUM SERPL-MCNC: 8 MG/DL (ref 8.8–10.2)
CHLORIDE SERPL-SCNC: 111 MMOL/L (ref 98–107)
COHGB MFR BLD: 96.4 % (ref 95–96)
CREAT SERPL-MCNC: 0.9 MG/DL (ref 0.67–1.17)
DEPRECATED HCO3 PLAS-SCNC: 24 MMOL/L (ref 22–29)
DIASTOLIC BLOOD PRESSURE - MUSE: NORMAL MMHG
EGFRCR SERPLBLD CKD-EPI 2021: >90 ML/MIN/1.73M2
ERYTHROCYTE [DISTWIDTH] IN BLOOD BY AUTOMATED COUNT: 13.3 % (ref 10–15)
GLUCOSE BLDC GLUCOMTR-MCNC: 113 MG/DL (ref 70–99)
GLUCOSE BLDC GLUCOMTR-MCNC: 116 MG/DL (ref 70–99)
GLUCOSE BLDC GLUCOMTR-MCNC: 122 MG/DL (ref 70–99)
GLUCOSE BLDC GLUCOMTR-MCNC: 125 MG/DL (ref 70–99)
GLUCOSE BLDC GLUCOMTR-MCNC: 125 MG/DL (ref 70–99)
GLUCOSE BLDC GLUCOMTR-MCNC: 126 MG/DL (ref 70–99)
GLUCOSE BLDC GLUCOMTR-MCNC: 127 MG/DL (ref 70–99)
GLUCOSE BLDC GLUCOMTR-MCNC: 129 MG/DL (ref 70–99)
GLUCOSE SERPL-MCNC: 128 MG/DL (ref 70–99)
HCO3 BLD-SCNC: 26 MMOL/L (ref 21–28)
HCT VFR BLD AUTO: 34.8 % (ref 40–53)
HGB BLD-MCNC: 11.7 G/DL (ref 13.3–17.7)
INTERPRETATION ECG - MUSE: NORMAL
MAGNESIUM SERPL-MCNC: 2.2 MG/DL (ref 1.7–2.3)
MCH RBC QN AUTO: 30.6 PG (ref 26.5–33)
MCHC RBC AUTO-ENTMCNC: 33.6 G/DL (ref 31.5–36.5)
MCV RBC AUTO: 91 FL (ref 78–100)
O2/TOTAL GAS SETTING VFR VENT: 21 %
P AXIS - MUSE: 65 DEGREES
PCO2 BLD: 45 MM HG (ref 35–45)
PH BLD: 7.36 [PH] (ref 7.35–7.45)
PHOSPHATE SERPL-MCNC: 4.1 MG/DL (ref 2.5–4.5)
PLATELET # BLD AUTO: 157 10E3/UL (ref 150–450)
PO2 BLD: 79 MM HG (ref 80–105)
POTASSIUM SERPL-SCNC: 4.2 MMOL/L (ref 3.4–5.3)
PR INTERVAL - MUSE: 184 MS
PROT SERPL-MCNC: 5.1 G/DL (ref 6.4–8.3)
QRS DURATION - MUSE: 82 MS
QT - MUSE: 398 MS
QTC - MUSE: 426 MS
R AXIS - MUSE: 61 DEGREES
RBC # BLD AUTO: 3.82 10E6/UL (ref 4.4–5.9)
SAO2 % BLDA: 95 % (ref 92–100)
SODIUM SERPL-SCNC: 143 MMOL/L (ref 135–145)
SYSTOLIC BLOOD PRESSURE - MUSE: NORMAL MMHG
T AXIS - MUSE: 1 DEGREES
VENTRICULAR RATE- MUSE: 69 BPM
WBC # BLD AUTO: 12 10E3/UL (ref 4–11)

## 2024-04-03 PROCEDURE — 97165 OT EVAL LOW COMPLEX 30 MIN: CPT | Mod: GO

## 2024-04-03 PROCEDURE — 250N000013 HC RX MED GY IP 250 OP 250 PS 637: Performed by: SURGERY

## 2024-04-03 PROCEDURE — 85027 COMPLETE CBC AUTOMATED: CPT | Performed by: PHYSICIAN ASSISTANT

## 2024-04-03 PROCEDURE — 82805 BLOOD GASES W/O2 SATURATION: CPT | Performed by: PHYSICIAN ASSISTANT

## 2024-04-03 PROCEDURE — 94799 UNLISTED PULMONARY SVC/PX: CPT

## 2024-04-03 PROCEDURE — 71045 X-RAY EXAM CHEST 1 VIEW: CPT

## 2024-04-03 PROCEDURE — 97535 SELF CARE MNGMENT TRAINING: CPT | Mod: GO

## 2024-04-03 PROCEDURE — 84100 ASSAY OF PHOSPHORUS: CPT | Performed by: PHYSICIAN ASSISTANT

## 2024-04-03 PROCEDURE — 93010 ELECTROCARDIOGRAM REPORT: CPT | Mod: HIP | Performed by: INTERNAL MEDICINE

## 2024-04-03 PROCEDURE — 93005 ELECTROCARDIOGRAM TRACING: CPT

## 2024-04-03 PROCEDURE — 250N000011 HC RX IP 250 OP 636: Performed by: PHYSICIAN ASSISTANT

## 2024-04-03 PROCEDURE — 83735 ASSAY OF MAGNESIUM: CPT | Performed by: PHYSICIAN ASSISTANT

## 2024-04-03 PROCEDURE — 82330 ASSAY OF CALCIUM: CPT | Performed by: PHYSICIAN ASSISTANT

## 2024-04-03 PROCEDURE — 250N000013 HC RX MED GY IP 250 OP 250 PS 637: Performed by: PHYSICIAN ASSISTANT

## 2024-04-03 PROCEDURE — 97110 THERAPEUTIC EXERCISES: CPT | Mod: GO

## 2024-04-03 PROCEDURE — 80053 COMPREHEN METABOLIC PANEL: CPT | Performed by: PHYSICIAN ASSISTANT

## 2024-04-03 PROCEDURE — 200N000001 HC R&B ICU

## 2024-04-03 PROCEDURE — 999N000157 HC STATISTIC RCP TIME EA 10 MIN

## 2024-04-03 PROCEDURE — 258N000003 HC RX IP 258 OP 636: Performed by: PHYSICIAN ASSISTANT

## 2024-04-03 RX ORDER — NICOTINE POLACRILEX 4 MG
15-30 LOZENGE BUCCAL
Status: DISCONTINUED | OUTPATIENT
Start: 2024-04-03 | End: 2024-04-05

## 2024-04-03 RX ORDER — DEXTROSE MONOHYDRATE 25 G/50ML
25-50 INJECTION, SOLUTION INTRAVENOUS
Status: DISCONTINUED | OUTPATIENT
Start: 2024-04-03 | End: 2024-04-05

## 2024-04-03 RX ADMIN — ASPIRIN 81 MG CHEWABLE TABLET 81 MG: 81 TABLET CHEWABLE at 08:23

## 2024-04-03 RX ADMIN — SENNOSIDES AND DOCUSATE SODIUM 1 TABLET: 8.6; 5 TABLET ORAL at 08:23

## 2024-04-03 RX ADMIN — SENNOSIDES AND DOCUSATE SODIUM 1 TABLET: 8.6; 5 TABLET ORAL at 20:30

## 2024-04-03 RX ADMIN — ACETAMINOPHEN 975 MG: 325 TABLET ORAL at 12:03

## 2024-04-03 RX ADMIN — ACETAMINOPHEN 975 MG: 325 TABLET ORAL at 20:24

## 2024-04-03 RX ADMIN — HEPARIN SODIUM 5000 UNITS: 10000 INJECTION, SOLUTION INTRAVENOUS; SUBCUTANEOUS at 14:57

## 2024-04-03 RX ADMIN — VANCOMYCIN HYDROCHLORIDE 1500 MG: 5 INJECTION, POWDER, LYOPHILIZED, FOR SOLUTION INTRAVENOUS at 06:56

## 2024-04-03 RX ADMIN — ACETAMINOPHEN 975 MG: 325 TABLET ORAL at 05:02

## 2024-04-03 RX ADMIN — ROSUVASTATIN CALCIUM 20 MG: 10 TABLET, FILM COATED ORAL at 08:23

## 2024-04-03 RX ADMIN — SODIUM CHLORIDE, POTASSIUM CHLORIDE, SODIUM LACTATE AND CALCIUM CHLORIDE 250 ML: 600; 310; 30; 20 INJECTION, SOLUTION INTRAVENOUS at 00:00

## 2024-04-03 RX ADMIN — LIDOCAINE 1 PATCH: 4 PATCH TOPICAL at 14:57

## 2024-04-03 RX ADMIN — CEFAZOLIN SODIUM 2 G: 2 INJECTION, SOLUTION INTRAVENOUS at 09:43

## 2024-04-03 RX ADMIN — HEPARIN SODIUM 5000 UNITS: 10000 INJECTION, SOLUTION INTRAVENOUS; SUBCUTANEOUS at 22:31

## 2024-04-03 RX ADMIN — PANTOPRAZOLE SODIUM 40 MG: 40 TABLET, DELAYED RELEASE ORAL at 08:23

## 2024-04-03 RX ADMIN — POLYETHYLENE GLYCOL 3350 17 G: 17 POWDER, FOR SOLUTION ORAL at 08:23

## 2024-04-03 ASSESSMENT — ACTIVITIES OF DAILY LIVING (ADL)
ADLS_ACUITY_SCORE: 30
ADLS_ACUITY_SCORE: 31
DEPENDENT_IADLS:: INDEPENDENT
ADLS_ACUITY_SCORE: 31
ADLS_ACUITY_SCORE: 31
IADL_COMMENTS: INDEPENDENT
ADLS_ACUITY_SCORE: 31
ADLS_ACUITY_SCORE: 31
ADLS_ACUITY_SCORE: 30
ADLS_ACUITY_SCORE: 31
ADLS_ACUITY_SCORE: 31
ADLS_ACUITY_SCORE: 30
ADLS_ACUITY_SCORE: 31
ADLS_ACUITY_SCORE: 30
ADLS_ACUITY_SCORE: 30
ADLS_ACUITY_SCORE: 31
ADLS_ACUITY_SCORE: 30

## 2024-04-03 NOTE — PLAN OF CARE
[facility] - ICU    RN Progress Note:            Pertinent Assessments:      Please refer to flowsheet rows for full assessment     A&O x4. Some mild numbness and tingling in hands. Neuro otherwise intact.    100% A paced at 80, underlying NSR with HR 70. Pulses palpable throughout.    Patient on 1L NC, clear lung sounds. IS up to 1500, pt using tool independently and using flutter valve. Nonproductive strong cough.    Mediastinal chest tubes in place x2, dark red output.    Bowel sounds normoactive. Tolerating sips and chips.    Cano in place.    No complaints of pain, patient receiving scheduled tylenol.    Midline incision CDI, approximated.           Key Events - This Shift:       Dangled around 2130, tolerated very well. BP did drop a bit but no dizziness or lightheadedness. Up to chair this AM with no issues.    Patient received 1L of LR from me (total of 2.75L). Landen infusing at 0.2. Insulin infusing at 0.5 (alg 1).     Chest tubes had about 80-90 q2h and 410 total in 12 hours (total of 600 in atrium).    Urine had about 125-175 out q2h (total of 850 in 12 hours).    No pain. Received scheduled tylenol.           Barriers to Discharge / Downgrade:     LDAs, gtts, TPM.         Point of Contact Update YES-OR-NO: Yes  Name:Terri  Phone Number:see chart  Summary of Conversation: Patient did very well overnight. Still on a little bit of BP medication but that is ok and he will probably come off of it later today. She was pleased to hear the good news and will be by in 1-2 hours.         Problem: Cardiovascular Surgery  Goal: Effective Oxygenation and Ventilation  Outcome: Progressing  Intervention: Promote Airway Secretion Clearance  Recent Flowsheet Documentation  Taken 4/2/2024 2000 by Valeria Cesar, RN  Cough And Deep Breathing: done independently per patient  Intervention: Optimize Oxygenation and Ventilation  Recent Flowsheet Documentation  Taken 4/2/2024 2000 by Valeria Cesar  EMBER RN  Chest Tube Safety:   all connections secured   rubber-tipped hemostat(s) with patient   suction checked   petroleum gauze dressing with patient     Problem: Cardiovascular Surgery  Goal: Effective Urinary Elimination  Outcome: Progressing     Problem: Cardiovascular Surgery  Goal: Acceptable Pain Control  Outcome: Progressing     Problem: Cardiovascular Surgery  Goal: Blood Glucose Level Within Targeted Range  Outcome: Progressing  Intervention: Optimize Glycemic Control  Recent Flowsheet Documentation  Taken 4/2/2024 2000 by Valeria Cesar, RN  Glycemic Management:   blood glucose monitored   insulin infusion adjusted     Problem: Cardiovascular Surgery  Goal: Fluid and Electrolyte Balance  Outcome: Progressing  Intervention: Monitor and Manage Fluid and Electrolyte Balance  Recent Flowsheet Documentation  Taken 4/2/2024 2000 by Valeria Cesar, RN  Fluid/Electrolyte Management:   fluids provided   intravenous fluids adjusted     Problem: Cardiovascular Surgery  Goal: Optimal Cerebral Tissue Perfusion  Outcome: Progressing  Intervention: Protect and Optimize Cerebral Perfusion  Recent Flowsheet Documentation  Taken 4/2/2024 2000 by Valeria Cesar, RN  Cerebral Perfusion Promotion:   blood pressure monitored   normothermia promoted  Glycemic Management:   blood glucose monitored   insulin infusion adjusted  Head of Bed (HOB) Positioning: HOB at 30 degrees     Problem: Cardiovascular Surgery  Goal: Effective Cardiac Function  Outcome: Progressing  Intervention: Optimize Cardiac Output and Blood Flow  Recent Flowsheet Documentation  Taken 4/2/2024 2000 by Valeria Cesar, RN  Stabilization Measures: fluid resuscitation initiated     Problem: Cardiovascular Surgery  Goal: Absence of Bleeding  Outcome: Progressing     Problem: Cardiovascular Surgery  Goal: Improved Activity Tolerance  Outcome: Progressing  Intervention: Optimize Tolerance for Activity  Recent Flowsheet Documentation  Taken 4/2/2024 2000  by Valeria Cesar, RN  Environmental Support: calm environment promoted     Problem: Adult Inpatient Plan of Care  Goal: Absence of Hospital-Acquired Illness or Injury  Intervention: Prevent and Manage VTE (Venous Thromboembolism) Risk  Recent Flowsheet Documentation  Taken 4/2/2024 2000 by Valeria Cesar, RN  VTE Prevention/Management: SCDs (sequential compression devices) on     Problem: Adult Inpatient Plan of Care  Goal: Absence of Hospital-Acquired Illness or Injury  Intervention: Prevent Skin Injury  Recent Flowsheet Documentation  Taken 4/2/2024 2000 by Valeria Cesar, RN  Body Position: supine, legs elevated  Skin Protection:   silicone foam dressing in place   skin to device areas padded   skin to skin areas padded   transparent dressing maintained  Device Skin Pressure Protection:   pressure points protected   skin-to-device areas padded   skin-to-skin areas padded   tubing/devices free from skin contact

## 2024-04-03 NOTE — TREATMENT PLAN
RCAT Treatment Plan    Patient Score: 8  Patient Acuity: 4    Clinical Indication for Therapy: prevent atelectasis    Therapy Ordered: flutter valve and IS QID    Assessment Summary: Patient is doiing well after surgery and performed well with the flutter and IS. I will keep the order QID to confirm pt is able to perform on his own.    Kenneth H. Kjellberg  4/2/2024

## 2024-04-03 NOTE — PROGRESS NOTES
[facility] - ICU    RN Progress Note:            Pertinent Assessments:      Please refer to flowsheet rows for full assessment     Respiratory, Cardiovascular, Output           Key Events - This Shift:   Patient weaned off Landen drip this afternoon and lines pulled. Insulin drip transitioned to sliding scale. Patient up to chair x2 today. Patient denies pain and declines any prn's. Tolerating PO well, diet advanced. Patient performs IS and flutter valve independently throughout the day. Remains on 1LPM NC as sats dropped on room air.           Barriers to Discharge / Downgrade:     None, anticipate downgrade to tele         Point of Contact Update YES-OR-NO: Yes  Name:Terri (spouse) present at bedside throughout the day.       Chana Tariq RN\

## 2024-04-03 NOTE — PROGRESS NOTES
CT EXTUBATION FAST TRACK:    [facility] - ICU     FastTrack Candidate: Jazzy latif [id number]     Patient Status: Extubated at 1810.

## 2024-04-03 NOTE — CONSULTS
Care Management Initial Consult    General Information  Assessment completed with: Patient,    Type of CM/SW Visit: Initial Assessment    Primary Care Provider verified and updated as needed: Yes   Readmission within the last 30 days: no previous admission in last 30 days         Advance Care Planning: Advance Care Planning Reviewed: present on chart          Communication Assessment  Patient's communication style: spoken language (English or Bilingual)    Hearing Difficulty or Deaf: no   Wear Glasses or Blind: no    Cognitive  Cognitive/Neuro/Behavioral: WDL  Level of Consciousness: alert  Arousal Level: opens eyes spontaneously  Orientation: oriented x 4  Mood/Behavior: behavior appropriate to situation, calm, cooperative  Best Language: 0 - No aphasia  Speech: clear, spontaneous, logical    Living Environment:   People in home: spouse     Current living Arrangements: house      Able to return to prior arrangements: yes       Family/Social Support:  Care provided by: self  Provides care for: no one  Marital Status:   Wife, Children          Description of Support System: Supportive, Involved         Current Resources:   Patient receiving home care services:  none     Community Resources:  none  Equipment currently used at home: none  Supplies currently used at home:  none    Employment/Financial:  Employment Status: employed part-time         Does the patient's insurance plan have a 3 day qualifying hospital stay waiver?  unknown    Lifestyle & Psychosocial Needs:  Social Determinants of Health     Food Insecurity: Not on file   Depression: Not on file   Housing Stability: Not on file   Tobacco Use: Low Risk  (4/2/2024)    Patient History     Smoking Tobacco Use: Never     Smokeless Tobacco Use: Never     Passive Exposure: Not on file   Financial Resource Strain: Not on file   Alcohol Use: Not on file   Transportation Needs: Not on file   Physical Activity: Not on file   Interpersonal Safety: Not on file    Stress: Not on file   Social Connections: Not on file       Functional Status:  Prior to admission patient needed assistance:   Dependent ADLs:: Independent  Dependent IADLs:: Independent         Additional Information:    Assessment completed with patient. Patient reports he lives with his spouse in their house. He is independent with ADLs/IADLs, ambulates without devices, drives and works part-time. Spouse is primary family contact. Family will transport at discharge.     Lizz Meng RN

## 2024-04-03 NOTE — PROGRESS NOTES
RCAT Treatment Plan    Patient Score: 8  Patient Acuity: 4    Clinical Indication for Therapy: s/p Ascending Aortic Aneurysm replacement.    Therapy Ordered: Flutter valve BID    Assessment Summary: s/p Ascending Aortic Aneurysm replacement 4/02/24. BS clear, diminished bases CXR clear. Pt performing flutter valve and IS independently at 4233-8686, predicted 2550. NO hx smoking or respiratory disease. Pt encouraged to continue to use flutter valve/IS independently Q 1 hour w/a. Pt is performing independently. RT will continue with flutter valve BID.    Obinna Christian, RT  4/3/2024

## 2024-04-03 NOTE — PROGRESS NOTES
Pulmonary/Critical Care  4/3/2024  8:45 AM     Chart reviewed.   Extubated yesterday evening and doing well this morning; up in chair, low flow O2.   Hemodynamic management per CV-surgery.   Push IS/flutter/intentional cough/ activity.   Our service will sign off.     Nani Weber CNP  [facility] Pulmonary/Critical Care
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
## 2024-04-03 NOTE — PROGRESS NOTES
"CVTS Daily Progress Note   POD#1 s/p ascending aortic replacement / LAAE  Attending: Randy  LOS: 1    SUBJECTIVE/INTERVAL EVENTS:    Patient arrived to ICU from OR yesterday afternoon. He was subsequently extubated. Remains on low-dose shahrzad (0.2); normotensive. NSR. Maintaining O2 sats on room air. Patient progressing well. Up to chair this AM. Reports \"no pain\". - BM / flatus. Tolerating diet without nausea. UOP adequate. Chest tube output appropriate. Hgb 11.7. Patient denies new chest pain, shortness of breath, abdominal pain, calf pain, nausea. Patient has no questions today.     OBJECTIVE:  Temp:  [97.6  F (36.4  C)-99.2  F (37.3  C)] 97.6  F (36.4  C)  Pulse:  [66-80] 68  Resp:  [14-35] 26  BP: (103-116)/(58-70) 103/58  MAP:  [55 mmHg-106 mmHg] 66 mmHg  Arterial Line BP: (0-139)/(0-81) 89/51  FiO2 (%):  [40 %-100 %] 40 %  SpO2:  [86 %-100 %] 93 %  Vitals:    04/02/24 0533 04/02/24 0548   Weight: 107.5 kg (237 lb) 106.6 kg (235 lb)       Clinically Significant Risk Factors        # Hyperkalemia: Highest K = 5.6 mmol/L in last 2 days, will monitor as appropriate   # Hypocalcemia: Lowest Ca = 8 mg/dL in last 2 days, will monitor and replace as appropriate     # Hypoalbuminemia: Lowest albumin = 3.2 g/dL at 4/3/2024  3:16 AM, will monitor as appropriate  # Coagulation Defect: INR = 1.32 (Ref range: 0.85 - 1.15) and/or PTT = 43 Seconds (Ref range: 22 - 38 Seconds), will monitor for bleeding           # Obesity: Estimated body mass index is 33.72 kg/m  as calculated from the following:    Height as of this encounter: 1.778 m (5' 10\").    Weight as of this encounter: 106.6 kg (235 lb)., PRESENT ON ADMISSION                     Current Medications:    Scheduled Meds:  Current Facility-Administered Medications   Medication Dose Route Frequency Provider Last Rate Last Admin    acetaminophen (TYLENOL) tablet 975 mg  975 mg Oral Q8H Sherron Cantu PA-C   975 mg at 04/03/24 0502    aspirin (ASA) chewable " tablet 81 mg  81 mg Oral or NG Tube Daily Sherron Cantu PA-C   81 mg at 04/03/24 0823    ceFAZolin (ANCEF) 2 g in 100 mL D5W intermittent infusion  2 g Intravenous Q8H Sherron Cantu PA-C 200 mL/hr at 04/02/24 2348 2 g at 04/02/24 2348    heparin ANTICOAGULANT injection 5,000 Units  5,000 Units Subcutaneous Q8H Sherron Cantu PA-C        insulin aspart (NovoLOG) injection (RAPID ACTING)  1-7 Units Subcutaneous TID AC Sherron Cantu PA-C        insulin aspart (NovoLOG) injection (RAPID ACTING)  1-5 Units Subcutaneous At Bedtime Sherron Cantu PA-C        Lidocaine (LIDOCARE) 4 % Patch 1-2 patch  1-2 patch Transdermal Q24H Sherron Cantu PA-C   1 patch at 04/02/24 1424    pantoprazole (PROTONIX) 2 mg/mL suspension 40 mg  40 mg Oral or NG Tube Daily Sherron Cantu PA-C        Or    pantoprazole (PROTONIX) EC tablet 40 mg  40 mg Oral Daily Sherron Cantu PA-C   40 mg at 04/03/24 0823    polyethylene glycol (MIRALAX) Packet 17 g  17 g Oral Daily Sherron Cantu PA-C   17 g at 04/03/24 0823    rosuvastatin (CRESTOR) tablet 20 mg  20 mg Oral Daily James Padilla MD   20 mg at 04/03/24 0823    senna-docusate (SENOKOT-S/PERICOLACE) 8.6-50 MG per tablet 1 tablet  1 tablet Oral BID Sherron Cantu PA-C   1 tablet at 04/03/24 0823     Continuous Infusions:  Current Facility-Administered Medications   Medication Dose Route Frequency Provider Last Rate Last Admin    EPINEPHrine (ADRENALIN) 5 mg in sodium chloride 0.9 % 250 mL infusion CENTRAL  0.01-0.1 mcg/kg/min Intravenous Continuous PRN Sherron Cantu PA-C        phenylephrine (ERLINDA-SYNEPHRINE) 50 mg in NaCl 0.9 % 250 mL infusion  0.1-4 mcg/kg/min Intravenous Continuous PRN Sherron Cantu PA-C 6.4 mL/hr at 04/03/24 0624 0.2 mcg/kg/min at 04/03/24 0624     PRN Meds:.  Current Facility-Administered Medications   Medication Dose Route Frequency Provider Last Rate  Last Admin    [START ON 4/5/2024] acetaminophen (TYLENOL) tablet 650 mg  650 mg Oral Q4H PRN Sherron Cantu PA-C        bisacodyl (DULCOLAX) suppository 10 mg  10 mg Rectal Daily PRN Sherron Cantu PA-C        calcium gluconate 1 g in 50 mL in sodium chloride intermittent infusion  1 g Intravenous Once PRN Sherron Cnatu PA-C        calcium gluconate 2 g in  mL intermittent infusion  2 g Intravenous Once PRN Sherron Cantu PA-C        calcium gluconate 3 g in sodium chloride 0.9 % 100 mL intermittent infusion  3 g Intravenous Once PRN Sherron Cantu PA-C        glucose gel 15-30 g  15-30 g Oral Q15 Min PRN Sherron Cantu PA-C        Or    dextrose 50 % injection 25-50 mL  25-50 mL Intravenous Q15 Min PRN Sherron Cantu PA-C        Or    glucagon injection 1 mg  1 mg Subcutaneous Q15 Min PRN Sherron Cantu PA-C        glucose gel 15-30 g  15-30 g Oral Q15 Min PRN Sherron Cantu PA-C        Or    dextrose 50 % injection 25-50 mL  25-50 mL Intravenous Q15 Min PRN Sherron Cantu PA-C        Or    glucagon injection 1 mg  1 mg Subcutaneous Q15 Min PRN Sherron Cantu PA-C        EPINEPHrine (ADRENALIN) 5 mg in sodium chloride 0.9 % 250 mL infusion CENTRAL  0.01-0.1 mcg/kg/min Intravenous Continuous PRN Sherron Cantu PA-C        hydrALAZINE (APRESOLINE) injection 10 mg  10 mg Intravenous Q30 Min PRN Sherron Cantu PA-C        HYDROmorphone (DILAUDID) injection 0.2 mg  0.2 mg Intravenous Q2H PRN Sherron Cantu PA-C        Or    HYDROmorphone (DILAUDID) injection 0.4 mg  0.4 mg Intravenous Q2H PRN Sherron Cantu PA-C   0.4 mg at 04/02/24 1313    lactated ringers BOLUS 250 mL  250 mL Intravenous Q15 Min PRN Sherron Cantu PA-C   250 mL at 04/03/24 0000    magnesium hydroxide (MILK OF MAGNESIA) suspension 30 mL  30 mL Oral Daily PRN Sherron Cantu PA-C        naloxone  (NARCAN) injection 0.2 mg  0.2 mg Intravenous Q2 Min PRN James Padilla MD        Or    naloxone (NARCAN) injection 0.4 mg  0.4 mg Intravenous Q2 Min PRN James Padilla MD        Or    naloxone (NARCAN) injection 0.2 mg  0.2 mg Intramuscular Q2 Min PRN James Padilla MD        Or    naloxone (NARCAN) injection 0.4 mg  0.4 mg Intramuscular Q2 Min PRN James Padilla MD        ondansetron (ZOFRAN ODT) ODT tab 4 mg  4 mg Oral Q6H PRN Sherron Cantu PA-C        Or    ondansetron (ZOFRAN) injection 4 mg  4 mg Intravenous Q6H PRN Sherron Cantu PA-C        oxyCODONE (ROXICODONE) tablet 5 mg  5 mg Oral Q4H PRN Sherron Cantu PA-C        Or    oxyCODONE (ROXICODONE) tablet 10 mg  10 mg Oral Q4H PRN Sherron Cantu PA-C        phenylephrine (ERLINDA-SYNEPHRINE) 50 mg in NaCl 0.9 % 250 mL infusion  0.1-4 mcg/kg/min Intravenous Continuous PRN Sherron Cantu PA-C 6.4 mL/hr at 04/03/24 0624 0.2 mcg/kg/min at 04/03/24 0624    prochlorperazine (COMPAZINE) injection 5 mg  5 mg Intravenous Q6H PRN Sherron Cantu PA-C        Or    prochlorperazine (COMPAZINE) tablet 5 mg  5 mg Oral Q6H PRN Sherron Cantu PA-C           Cardiographics:    Telemetry monitoring demonstrates NSR with rates in the 70s per my personal review.    Imaging:  Results for orders placed or performed during the hospital encounter of 04/02/24   XR Chest Port 1 View    Impression    IMPRESSION: Poststernotomy changes with mediastinal chest tube. Endotracheal tube is 4 cm from the rafita.     NG tube can be seen coursing into the stomach. The proximal port is above the GE junction. Suggest advancing the tube another 4 to 5 cm.    Right IJ cordis overlies the proximal SVC.    Left lower lobe atelectasis silhouetting the left hemidiaphragm. There is a trace effusion. No pneumothorax. Right lung is clear. Heart is at the upper limits of normal.   XR Chest Port 1  View    Impression    IMPRESSION: Interval removal of nasogastric tube and endotracheal tube. Right IJ introducer and mediastinal drains remain. Cardiac silhouette is prominent but unchanged. Normal vascular volume with clear lungs. No pneumothorax or pleural effusion.       Labs, personally reviewed.  Hemoglobin   Date Value Ref Range Status   04/03/2024 11.7 (L) 13.3 - 17.7 g/dL Final   04/02/2024 13.0 (L) 13.3 - 17.7 g/dL Final   04/02/2024 11.9 (L) 13.3 - 17.7 g/dL Final     Hemoglobin POCT   Date Value Ref Range Status   04/02/2024 12.2 (L) 13.3 - 17.7 g/dL Final   04/02/2024 12.3 (L) 13.3 - 17.7 g/dL Final   04/02/2024 12.9 (L) 13.3 - 17.7 g/dL Final     WBC Count   Date Value Ref Range Status   04/03/2024 12.0 (H) 4.0 - 11.0 10e3/uL Final   04/02/2024 11.6 (H) 4.0 - 11.0 10e3/uL Final   04/02/2024 11.2 (H) 4.0 - 11.0 10e3/uL Final     Platelet Count   Date Value Ref Range Status   04/03/2024 157 150 - 450 10e3/uL Final   04/02/2024 149 (L) 150 - 450 10e3/uL Final   04/02/2024 132 (L) 150 - 450 10e3/uL Final     Creatinine   Date Value Ref Range Status   04/03/2024 0.90 0.67 - 1.17 mg/dL Final   04/02/2024 0.89 0.67 - 1.17 mg/dL Final   03/12/2024 0.92 0.67 - 1.17 mg/dL Final     Potassium   Date Value Ref Range Status   04/03/2024 4.2 3.4 - 5.3 mmol/L Final   04/02/2024 4.6 3.4 - 5.3 mmol/L Final   04/02/2024 4.6 3.4 - 5.3 mmol/L Final   05/17/2022 4.4 3.5 - 5.0 mmol/L Final   04/08/2022 3.8 3.5 - 5.0 mmol/L Final   03/22/2022 4.6 3.5 - 5.0 mmol/L Final     Potassium POCT   Date Value Ref Range Status   04/02/2024 4.6 3.4 - 5.3 mmol/L Final   04/02/2024 4.6 3.4 - 5.3 mmol/L Final   04/02/2024 5.6 (H) 3.4 - 5.3 mmol/L Final     Magnesium   Date Value Ref Range Status   04/03/2024 2.2 1.7 - 2.3 mg/dL Final   04/02/2024 2.9 (H) 1.7 - 2.3 mg/dL Final   03/26/2024 2.0 1.7 - 2.3 mg/dL Final          I/O:  I/O last 3 completed shifts:  In: 8399.88 [P.O.:450; I.V.:4469.88; Other:400; NG/GT:80; IV  Piggyback:2750]  Out: 3346 [Urine:2071; Blood:675; Chest Tube:600]       Physical Exam:    General: Patient seen up in chair. NAD. Conversant. Pleasant.   CV: RRR on monitor. 2+ peripheral pulses in all extremities. Mild edema.   Pulm: Non-labored effort on room air. Chest tubes in place, serosanguinous output, no air leak. Incision C/D/I.  Abd: Soft, NT, ND  : Cano with roro urine  Ext: Mild pedal edema, SCDs in place, warm, distal pulses intact  Neuro: CNs grossly intact.       ASSESSMENT/PLAN:    Bobby Nelson is a 68 year old male with a history of ascending aortic aneurysm who is s/p ascending aortic replacement / LAAE.    Principal Problem:    Other ill-defined heart diseases  Active Problems:    Aneurysm of ascending aorta without rupture (H24)        NEURO:   - Scheduled Tylenol/lidocaine patches and PRN Tylenol/oxycodone/dilaudid for pain    CV:   - Pre-op EF 55-60%  - Normotensive  - Weaning pressors as hemodynamics allow, currently on shahrzad 0.2  - Beta blocker pending weaning from pressors  - ASA 81mg  - Rosuvastatin 20mg daily  - Chest tubes to remain today  - New baseline echo after chest tubes removed    PULM:   - Maintaining oxygen saturations on room air  - Encourage pulmonary toilet    FEN/GI:  - Continue electrolyte replacement protocol  - Diet: Cardiac, ADAT   - Bowel regimen    RENAL:  - Adequate UOP/hr. Continue to monitor closely.  - Cr 0.90  - Cano to remain in for close monitoring of I/O and during period of diuresis/relative immobility.   - Diuresis PRN and pending weaning from pressors    HEME:  - Acute blood loss anemia post-op.   - Hgb stable, no bleeding concerns. Hep SQ, ASA    ID:  - Kim op ppx complete, afebrile. No concerns for infection    ENDO:    - Transition to SSI    PPx:   - DVT: SCDs, SQ heparin TID, ambulation   - GI: Protonix 40mg PO daily    DISPO:   - Continue critical care in ICU for now due to pressor need        Patient discussed with   Randy.        _______  Sherron Cantu PA-C  Cardiothoracic Surgery  [phone]

## 2024-04-04 ENCOUNTER — APPOINTMENT (OUTPATIENT)
Dept: CARDIOLOGY | Facility: HOSPITAL | Age: 69
DRG: 220 | End: 2024-04-04
Attending: PHYSICIAN ASSISTANT
Payer: MEDICARE

## 2024-04-04 ENCOUNTER — APPOINTMENT (OUTPATIENT)
Dept: OCCUPATIONAL THERAPY | Facility: HOSPITAL | Age: 69
DRG: 220 | End: 2024-04-04
Attending: SURGERY
Payer: MEDICARE

## 2024-04-04 LAB
CA-I BLD-MCNC: 4.7 MG/DL (ref 4.4–5.2)
GLUCOSE BLDC GLUCOMTR-MCNC: 127 MG/DL (ref 70–99)
GLUCOSE BLDC GLUCOMTR-MCNC: 134 MG/DL (ref 70–99)
HOLD SPECIMEN: NORMAL
MAGNESIUM SERPL-MCNC: 2.1 MG/DL (ref 1.7–2.3)
PHOSPHATE SERPL-MCNC: 2.2 MG/DL (ref 2.5–4.5)
POTASSIUM SERPL-SCNC: 4.3 MMOL/L (ref 3.4–5.3)

## 2024-04-04 PROCEDURE — 255N000002 HC RX 255 OP 636: Performed by: SURGERY

## 2024-04-04 PROCEDURE — 250N000013 HC RX MED GY IP 250 OP 250 PS 637: Performed by: SURGERY

## 2024-04-04 PROCEDURE — 83735 ASSAY OF MAGNESIUM: CPT | Performed by: SURGERY

## 2024-04-04 PROCEDURE — 200N000001 HC R&B ICU

## 2024-04-04 PROCEDURE — 250N000011 HC RX IP 250 OP 636: Performed by: PHYSICIAN ASSISTANT

## 2024-04-04 PROCEDURE — 84100 ASSAY OF PHOSPHORUS: CPT | Performed by: SURGERY

## 2024-04-04 PROCEDURE — 999N000208 ECHOCARDIOGRAM COMPLETE

## 2024-04-04 PROCEDURE — 250N000013 HC RX MED GY IP 250 OP 250 PS 637: Performed by: PHYSICIAN ASSISTANT

## 2024-04-04 PROCEDURE — 36415 COLL VENOUS BLD VENIPUNCTURE: CPT | Performed by: PHYSICIAN ASSISTANT

## 2024-04-04 PROCEDURE — 82330 ASSAY OF CALCIUM: CPT | Performed by: PHYSICIAN ASSISTANT

## 2024-04-04 PROCEDURE — 97110 THERAPEUTIC EXERCISES: CPT | Mod: GO

## 2024-04-04 PROCEDURE — 84132 ASSAY OF SERUM POTASSIUM: CPT | Performed by: SURGERY

## 2024-04-04 PROCEDURE — 93306 TTE W/DOPPLER COMPLETE: CPT | Mod: 26 | Performed by: STUDENT IN AN ORGANIZED HEALTH CARE EDUCATION/TRAINING PROGRAM

## 2024-04-04 RX ORDER — FUROSEMIDE 10 MG/ML
40 INJECTION INTRAMUSCULAR; INTRAVENOUS 3 TIMES DAILY
Status: DISCONTINUED | OUTPATIENT
Start: 2024-04-04 | End: 2024-04-05

## 2024-04-04 RX ADMIN — POTASSIUM & SODIUM PHOSPHATES POWDER PACK 280-160-250 MG 1 PACKET: 280-160-250 PACK at 15:50

## 2024-04-04 RX ADMIN — POLYETHYLENE GLYCOL 3350 17 G: 17 POWDER, FOR SOLUTION ORAL at 08:23

## 2024-04-04 RX ADMIN — PANTOPRAZOLE SODIUM 40 MG: 40 TABLET, DELAYED RELEASE ORAL at 08:23

## 2024-04-04 RX ADMIN — FUROSEMIDE 40 MG: 10 INJECTION, SOLUTION INTRAMUSCULAR; INTRAVENOUS at 06:37

## 2024-04-04 RX ADMIN — ACETAMINOPHEN 975 MG: 325 TABLET ORAL at 20:46

## 2024-04-04 RX ADMIN — PERFLUTREN 5 ML: 6.52 INJECTION, SUSPENSION INTRAVENOUS at 13:26

## 2024-04-04 RX ADMIN — FUROSEMIDE 40 MG: 10 INJECTION, SOLUTION INTRAMUSCULAR; INTRAVENOUS at 12:31

## 2024-04-04 RX ADMIN — ASPIRIN 81 MG CHEWABLE TABLET 81 MG: 81 TABLET CHEWABLE at 08:23

## 2024-04-04 RX ADMIN — METOPROLOL TARTRATE 12.5 MG: 25 TABLET, FILM COATED ORAL at 08:23

## 2024-04-04 RX ADMIN — ACETAMINOPHEN 975 MG: 325 TABLET ORAL at 04:23

## 2024-04-04 RX ADMIN — POTASSIUM & SODIUM PHOSPHATES POWDER PACK 280-160-250 MG 1 PACKET: 280-160-250 PACK at 06:21

## 2024-04-04 RX ADMIN — SENNOSIDES AND DOCUSATE SODIUM 1 TABLET: 8.6; 5 TABLET ORAL at 08:23

## 2024-04-04 RX ADMIN — HEPARIN SODIUM 5000 UNITS: 10000 INJECTION, SOLUTION INTRAVENOUS; SUBCUTANEOUS at 21:29

## 2024-04-04 RX ADMIN — ROSUVASTATIN CALCIUM 20 MG: 10 TABLET, FILM COATED ORAL at 08:23

## 2024-04-04 RX ADMIN — POTASSIUM & SODIUM PHOSPHATES POWDER PACK 280-160-250 MG 1 PACKET: 280-160-250 PACK at 10:17

## 2024-04-04 RX ADMIN — FUROSEMIDE 40 MG: 10 INJECTION, SOLUTION INTRAMUSCULAR; INTRAVENOUS at 18:17

## 2024-04-04 RX ADMIN — SENNOSIDES AND DOCUSATE SODIUM 1 TABLET: 8.6; 5 TABLET ORAL at 20:46

## 2024-04-04 RX ADMIN — HEPARIN SODIUM 5000 UNITS: 10000 INJECTION, SOLUTION INTRAVENOUS; SUBCUTANEOUS at 06:22

## 2024-04-04 RX ADMIN — ACETAMINOPHEN 975 MG: 325 TABLET ORAL at 12:31

## 2024-04-04 RX ADMIN — HEPARIN SODIUM 5000 UNITS: 10000 INJECTION, SOLUTION INTRAVENOUS; SUBCUTANEOUS at 15:50

## 2024-04-04 RX ADMIN — METOPROLOL TARTRATE 12.5 MG: 25 TABLET, FILM COATED ORAL at 20:47

## 2024-04-04 ASSESSMENT — ACTIVITIES OF DAILY LIVING (ADL)
ADLS_ACUITY_SCORE: 30
ADLS_ACUITY_SCORE: 30
ADLS_ACUITY_SCORE: 31
ADLS_ACUITY_SCORE: 30
ADLS_ACUITY_SCORE: 31
ADLS_ACUITY_SCORE: 30
ADLS_ACUITY_SCORE: 31
ADLS_ACUITY_SCORE: 30
ADLS_ACUITY_SCORE: 31
ADLS_ACUITY_SCORE: 30
ADLS_ACUITY_SCORE: 30
ADLS_ACUITY_SCORE: 31
ADLS_ACUITY_SCORE: 30
ADLS_ACUITY_SCORE: 31

## 2024-04-04 NOTE — CONSULTS
CLINICAL NUTRITION SERVICES - EDUCATION NOTE    Received consult for s/p cv surgery diet education.     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  He has been trying to eat a little better for the past year.  He says he will start eating crunchy vegetables and fruit instead of chips, and start eating more fish - which he really likes.  Pt appears to understand the basics of heart healthy eating.  ?  NUTRITION DIAGNOSIS:  Food- and nutrition-related knowledge deficit related to heart healthy eating as evidenced by s/p ascending aortic replacement due to aortic aneurysm and left atrial appendage excision.    INTERVENTIONS:  Provided instruction on eating a heart healthy diet for recovery from surgery and beyond.    Provided  the following handouts: Heart Healthy Guidelines    Goals:  Patient verbalizes understanding of diet. Expect good compliance.    Follow Up:  Patient to ask any further nutrition-related questions before discharge.     RD contact information provided.

## 2024-04-04 NOTE — PLAN OF CARE
[facility] - ICU    RN Progress Note:    Pt is alert, oriented x 4, and able to make needs known. Pt reports ongoing mild numbness of pinky, and ring finger of left hand. Reassurance provided. SaO2 maintained within ordered parameters with supplemental O2 via nasal cannula at 1 LPM. Some S.O.B. with activity. Pt achieving 1,250 mL with incentive spirometer. Cardiac rhythm is normal sinus. Pacer wires capped. Surgical sternal incision is CD&I, and well approximated with liquid bandage. Post-surgical pain well managed with ordered analgesics. Mediastinal chest tubes remain in place, are without air leak, and set to wall suction, as ordered. Over NOC, chest tubes produced 110 mL serosanguinous output. Surgical site care provided/chest tube dressing replaced, per Unit routine. Positive flatus/no BM. Pt denies nausea. Bellow the knee sequential compression devices in place, bilaterally. Cano catheter removed at 06:30, per Unit routine. Pt has yet to void urine. Will continue to monitor. Preet Mac RN            Pertinent Assessments:      Please refer to flowsheet rows for full assessment     Vital signs.            Key Events - This Shift:       No overnight events.                Barriers to Discharge / Downgrade:     None.

## 2024-04-04 NOTE — PROGRESS NOTES
[facility] - ICU    RN Progress Note:        Key Events - This Shift:   Cano, chest tubes, and temporary pacing wires discontinued today. Lasix started with adequate diuresis. Tolerating cardiac diet. AccuChecks discontinued. Patient up to chair and walked with cardiac rehab x2. Continues to work on IS and flutter valve independently, sating mid 90's on room air. Denies pain and declines prn's.           Barriers to Discharge / Downgrade:     No post-op BM; shower tomorrow (24 hours post chest tube removal)         Chana Tariq, RN

## 2024-04-04 NOTE — PROGRESS NOTES
"CVTS Daily Progress Note   POD#2 s/p ascending aortic replacement / LAAE  Attending: Randy  LOS: 2    SUBJECTIVE/INTERVAL EVENTS:    No acute events overnight. Weaned from pressors yesterday; normotensive. NSR. Maintaining O2 sats on room air. Patient progressing well. Up to chair this AM and ambulating with therapy. Reports \"no pain\". - BM / +flatus. Tolerating diet without nausea. UOP adequate. Chest tube output appropriate. Patient denies new chest pain, shortness of breath, abdominal pain, calf pain, nausea. Patient has no questions today.     OBJECTIVE:  Temp:  [97.8  F (36.6  C)-99.2  F (37.3  C)] 97.8  F (36.6  C)  Pulse:  [69-81] 76  Resp:  [14-35] 29  BP: (107-142)/(57-77) 120/71  MAP:  [74 mmHg-87 mmHg] 77 mmHg  Arterial Line BP: (100-122)/(56-65) 106/59  SpO2:  [89 %-99 %] 97 %  Vitals:    04/02/24 0533 04/02/24 0548 04/04/24 0545   Weight: 107.5 kg (237 lb) 106.6 kg (235 lb) 112.6 kg (248 lb 4.8 oz)       Clinically Significant Risk Factors          # Hypocalcemia: Lowest Ca = 8 mg/dL in last 2 days, will monitor and replace as appropriate     # Hypoalbuminemia: Lowest albumin = 3.2 g/dL at 4/3/2024  3:16 AM, will monitor as appropriate    # Coagulation Defect: INR = 1.32 (Ref range: 0.85 - 1.15) and/or PTT = 43 Seconds (Ref range: 22 - 38 Seconds), will monitor for bleeding           # Obesity: Estimated body mass index is 35.63 kg/m  as calculated from the following:    Height as of this encounter: 1.778 m (5' 10\").    Weight as of this encounter: 112.6 kg (248 lb 4.8 oz)., PRESENT ON ADMISSION                     Current Medications:    Scheduled Meds:  Current Facility-Administered Medications   Medication Dose Route Frequency Provider Last Rate Last Admin    acetaminophen (TYLENOL) tablet 975 mg  975 mg Oral Q8H Sherron Cantu PA-C   975 mg at 04/04/24 0423    aspirin (ASA) chewable tablet 81 mg  81 mg Oral or NG Tube Daily Sherron Cantu PA-C   81 mg at 04/04/24 0839    " furosemide (LASIX) injection 40 mg  40 mg Intravenous TID Sherron Cantu PA-C   40 mg at 04/04/24 0637    heparin ANTICOAGULANT injection 5,000 Units  5,000 Units Subcutaneous Q8H Sherron Cantu PA-C   5,000 Units at 04/04/24 0622    Lidocaine (LIDOCARE) 4 % Patch 1-2 patch  1-2 patch Transdermal Q24H Sherron Cantu PA-C   1 patch at 04/03/24 1457    metoprolol tartrate (LOPRESSOR) half-tab 12.5 mg  12.5 mg Oral BID Sherron Cantu PA-C   12.5 mg at 04/04/24 0823    pantoprazole (PROTONIX) 2 mg/mL suspension 40 mg  40 mg Oral or NG Tube Daily Sherron Cantu PA-C        Or    pantoprazole (PROTONIX) EC tablet 40 mg  40 mg Oral Daily Sherron Cantu PA-C   40 mg at 04/04/24 0823    polyethylene glycol (MIRALAX) Packet 17 g  17 g Oral Daily Sherron Cantu PA-C   17 g at 04/04/24 0823    potassium & sodium phosphates (NEUTRA-PHOS) Packet 1 packet  1 packet Oral or Feeding Tube Q4H James Padilla MD   1 packet at 04/04/24 1017    rosuvastatin (CRESTOR) tablet 20 mg  20 mg Oral Daily James Padilla MD   20 mg at 04/04/24 0823    senna-docusate (SENOKOT-S/PERICOLACE) 8.6-50 MG per tablet 1 tablet  1 tablet Oral BID Sherron Cantu PA-C   1 tablet at 04/04/24 0823     Continuous Infusions:  Current Facility-Administered Medications   Medication Dose Route Frequency Provider Last Rate Last Admin     PRN Meds:.  Current Facility-Administered Medications   Medication Dose Route Frequency Provider Last Rate Last Admin    [START ON 4/5/2024] acetaminophen (TYLENOL) tablet 650 mg  650 mg Oral Q4H PRN Sherron Cantu PA-C        bisacodyl (DULCOLAX) suppository 10 mg  10 mg Rectal Daily PRN Sherron Cantu PA-C        calcium gluconate 1 g in 50 mL in sodium chloride intermittent infusion  1 g Intravenous Once PRN Sherron Cantu PA-C        calcium gluconate 2 g in  mL intermittent infusion  2 g Intravenous  Once PRN Sherron Cantu PA-C        calcium gluconate 3 g in sodium chloride 0.9 % 100 mL intermittent infusion  3 g Intravenous Once PRN Sherron Cantu PA-C        glucose gel 15-30 g  15-30 g Oral Q15 Min PRN Sherron Cantu PA-C        Or    dextrose 50 % injection 25-50 mL  25-50 mL Intravenous Q15 Min PRN Sherron Cantu PA-C        Or    glucagon injection 1 mg  1 mg Subcutaneous Q15 Min PRN Sherron Cantu PA-C        glucose gel 15-30 g  15-30 g Oral Q15 Min PRN Sherron Cantu PA-C        Or    dextrose 50 % injection 25-50 mL  25-50 mL Intravenous Q15 Min PRN Sherron Cantu PA-C        Or    glucagon injection 1 mg  1 mg Subcutaneous Q15 Min PRN Sherron Cantu PA-C        hydrALAZINE (APRESOLINE) injection 10 mg  10 mg Intravenous Q30 Min PRN Sherron Cantu PA-C        HYDROmorphone (DILAUDID) injection 0.2 mg  0.2 mg Intravenous Q2H PRN Sherron Cantu PA-C        Or    HYDROmorphone (DILAUDID) injection 0.4 mg  0.4 mg Intravenous Q2H PRN Sherron Cantu PA-C   0.4 mg at 04/02/24 1313    lactated ringers BOLUS 250 mL  250 mL Intravenous Q15 Min PRN Sherron Cantu PA-C   250 mL at 04/03/24 0000    magnesium hydroxide (MILK OF MAGNESIA) suspension 30 mL  30 mL Oral Daily PRN Sherron Cantu PA-C        naloxone (NARCAN) injection 0.2 mg  0.2 mg Intravenous Q2 Min PRN James Padilla MD        Or    naloxone (NARCAN) injection 0.4 mg  0.4 mg Intravenous Q2 Min PRN James Padilla MD        Or    naloxone (NARCAN) injection 0.2 mg  0.2 mg Intramuscular Q2 Min PRN James Padilla MD        Or    naloxone (NARCAN) injection 0.4 mg  0.4 mg Intramuscular Q2 Min PRN James Padilla MD        ondansetron (ZOFRAN ODT) ODT tab 4 mg  4 mg Oral Q6H PRN Sherron Cantu PA-C        Or    ondansetron (ZOFRAN) injection 4 mg  4 mg Intravenous Q6H PRN Pepe,  Sherron Ferrara PA-C        oxyCODONE (ROXICODONE) tablet 5 mg  5 mg Oral Q4H PRN Sherron Cantu PA-C        Or    oxyCODONE (ROXICODONE) tablet 10 mg  10 mg Oral Q4H PRN Sherron Cantu PA-C        prochlorperazine (COMPAZINE) injection 5 mg  5 mg Intravenous Q6H PRN Sherron Cantu PA-C        Or    prochlorperazine (COMPAZINE) tablet 5 mg  5 mg Oral Q6H PRN Sherron Cantu PA-C           Cardiographics:    Telemetry monitoring demonstrates NSR with rates in the 70s per my personal review.    Imaging:  Results for orders placed or performed during the hospital encounter of 04/02/24   XR Chest Port 1 View    Impression    IMPRESSION: Poststernotomy changes with mediastinal chest tube. Endotracheal tube is 4 cm from the rafita.     NG tube can be seen coursing into the stomach. The proximal port is above the GE junction. Suggest advancing the tube another 4 to 5 cm.    Right IJ cordis overlies the proximal SVC.    Left lower lobe atelectasis silhouetting the left hemidiaphragm. There is a trace effusion. No pneumothorax. Right lung is clear. Heart is at the upper limits of normal.   XR Chest Port 1 View    Impression    IMPRESSION: Interval removal of nasogastric tube and endotracheal tube. Right IJ introducer and mediastinal drains remain. Cardiac silhouette is prominent but unchanged. Normal vascular volume with clear lungs. No pneumothorax or pleural effusion.       Labs, personally reviewed.  Hemoglobin   Date Value Ref Range Status   04/03/2024 11.7 (L) 13.3 - 17.7 g/dL Final   04/02/2024 13.0 (L) 13.3 - 17.7 g/dL Final   04/02/2024 11.9 (L) 13.3 - 17.7 g/dL Final     Hemoglobin POCT   Date Value Ref Range Status   04/02/2024 12.2 (L) 13.3 - 17.7 g/dL Final   04/02/2024 12.3 (L) 13.3 - 17.7 g/dL Final   04/02/2024 12.9 (L) 13.3 - 17.7 g/dL Final     WBC Count   Date Value Ref Range Status   04/03/2024 12.0 (H) 4.0 - 11.0 10e3/uL Final   04/02/2024 11.6 (H) 4.0 - 11.0 10e3/uL  Final   04/02/2024 11.2 (H) 4.0 - 11.0 10e3/uL Final     Platelet Count   Date Value Ref Range Status   04/03/2024 157 150 - 450 10e3/uL Final   04/02/2024 149 (L) 150 - 450 10e3/uL Final   04/02/2024 132 (L) 150 - 450 10e3/uL Final     Creatinine   Date Value Ref Range Status   04/03/2024 0.90 0.67 - 1.17 mg/dL Final   04/02/2024 0.89 0.67 - 1.17 mg/dL Final   03/12/2024 0.92 0.67 - 1.17 mg/dL Final     Potassium   Date Value Ref Range Status   04/04/2024 4.3 3.4 - 5.3 mmol/L Final   04/03/2024 4.2 3.4 - 5.3 mmol/L Final   04/02/2024 4.6 3.4 - 5.3 mmol/L Final   04/02/2024 4.6 3.4 - 5.3 mmol/L Final   05/17/2022 4.4 3.5 - 5.0 mmol/L Final   04/08/2022 3.8 3.5 - 5.0 mmol/L Final   03/22/2022 4.6 3.5 - 5.0 mmol/L Final     Potassium POCT   Date Value Ref Range Status   04/02/2024 4.6 3.4 - 5.3 mmol/L Final   04/02/2024 4.6 3.4 - 5.3 mmol/L Final   04/02/2024 5.6 (H) 3.4 - 5.3 mmol/L Final     Magnesium   Date Value Ref Range Status   04/04/2024 2.1 1.7 - 2.3 mg/dL Final   04/03/2024 2.2 1.7 - 2.3 mg/dL Final   04/02/2024 2.9 (H) 1.7 - 2.3 mg/dL Final          I/O:  I/O last 3 completed shifts:  In: 1923 [P.O.:1140; I.V.:783]  Out: 1505 [Urine:1155; Chest Tube:350]       Physical Exam:    General: Patient seen up in chair. NAD. Conversant. Pleasant.   CV: RRR on monitor. 2+ peripheral pulses in all extremities. Mild edema.   Pulm: Non-labored effort on room air. Chest tubes in place, serosanguinous output, no air leak. Incision C/D/I.  Abd: Soft, NT, ND  Ext: Mild pedal edema, SCDs in place, warm, distal pulses intact  Neuro: CNs grossly intact.       ASSESSMENT/PLAN:    Bobby Nelson is a 68 year old male with a history of ascending aortic aneurysm who is s/p ascending aortic replacement / LAAE.    Principal Problem:    Other ill-defined heart diseases  Active Problems:    Aneurysm of ascending aorta without rupture (H24)        NEURO:   - Scheduled Tylenol/lidocaine patches and PRN Tylenol/oxycodone for pain    CV:    - Pre-op EF 55-60%  - Normotensive  - Metoprolol 12.5mg BID  - ASA 81mg  - Rosuvastatin 20mg daily  - Chest tubes and TPWs removed this AM   - New baseline echo ordered    PULM:   - Maintaining oxygen saturations on room air  - Encourage pulmonary toilet    FEN/GI:  - Continue electrolyte replacement protocol  - Diet: Cardiac, ADAT   - Bowel regimen    RENAL:  - Adequate UOP/hr. Continue to monitor closely.  - Diuresis with Lasix 40mg IV TID    HEME:  - Acute blood loss anemia post-op.   - Hgb stable, no bleeding concerns. Hep SQ, ASA    ID:  - Kim op ppx complete, afebrile. No concerns for infection    ENDO:    - No issues; BG checks stopped    PPx:   - DVT: SCDs, SQ heparin TID, ambulation   - GI: Protonix 40mg PO daily    DISPO:   - Transfer to general telemetry status        Patient discussed with Dr. Padilla.        _______  Sherron Cantu PA-C  Cardiothoracic Surgery  [phone]

## 2024-04-05 ENCOUNTER — APPOINTMENT (OUTPATIENT)
Dept: OCCUPATIONAL THERAPY | Facility: HOSPITAL | Age: 69
DRG: 220 | End: 2024-04-05
Attending: SURGERY
Payer: MEDICARE

## 2024-04-05 LAB
CA-I BLD-MCNC: 4.5 MG/DL (ref 4.4–5.2)
PATH REPORT.COMMENTS IMP SPEC: NORMAL
PATH REPORT.COMMENTS IMP SPEC: NORMAL
PATH REPORT.FINAL DX SPEC: NORMAL
PATH REPORT.GROSS SPEC: NORMAL
PATH REPORT.MICROSCOPIC SPEC OTHER STN: NORMAL
PATH REPORT.RELEVANT HX SPEC: NORMAL
PHOSPHATE SERPL-MCNC: 2.2 MG/DL (ref 2.5–4.5)
PHOTO IMAGE: NORMAL
POTASSIUM SERPL-SCNC: 3.5 MMOL/L (ref 3.4–5.3)

## 2024-04-05 PROCEDURE — 97110 THERAPEUTIC EXERCISES: CPT | Mod: GO

## 2024-04-05 PROCEDURE — 88305 TISSUE EXAM BY PATHOLOGIST: CPT | Mod: 26 | Performed by: PATHOLOGY

## 2024-04-05 PROCEDURE — 82330 ASSAY OF CALCIUM: CPT | Performed by: PHYSICIAN ASSISTANT

## 2024-04-05 PROCEDURE — 250N000013 HC RX MED GY IP 250 OP 250 PS 637: Performed by: SURGERY

## 2024-04-05 PROCEDURE — 36415 COLL VENOUS BLD VENIPUNCTURE: CPT | Performed by: PHYSICIAN ASSISTANT

## 2024-04-05 PROCEDURE — 84100 ASSAY OF PHOSPHORUS: CPT | Performed by: PHYSICIAN ASSISTANT

## 2024-04-05 PROCEDURE — 250N000013 HC RX MED GY IP 250 OP 250 PS 637: Performed by: PHYSICIAN ASSISTANT

## 2024-04-05 PROCEDURE — 210N000001 HC R&B IMCU HEART CARE

## 2024-04-05 PROCEDURE — 250N000011 HC RX IP 250 OP 636: Performed by: PHYSICIAN ASSISTANT

## 2024-04-05 PROCEDURE — 84132 ASSAY OF SERUM POTASSIUM: CPT | Performed by: PHYSICIAN ASSISTANT

## 2024-04-05 RX ORDER — POTASSIUM CHLORIDE 1500 MG/1
20 TABLET, EXTENDED RELEASE ORAL ONCE
Status: COMPLETED | OUTPATIENT
Start: 2024-04-05 | End: 2024-04-05

## 2024-04-05 RX ORDER — FUROSEMIDE 10 MG/ML
40 INJECTION INTRAMUSCULAR; INTRAVENOUS 2 TIMES DAILY
Status: DISCONTINUED | OUTPATIENT
Start: 2024-04-05 | End: 2024-04-06 | Stop reason: HOSPADM

## 2024-04-05 RX ADMIN — SENNOSIDES AND DOCUSATE SODIUM 1 TABLET: 8.6; 5 TABLET ORAL at 20:17

## 2024-04-05 RX ADMIN — HEPARIN SODIUM 5000 UNITS: 10000 INJECTION, SOLUTION INTRAVENOUS; SUBCUTANEOUS at 14:09

## 2024-04-05 RX ADMIN — SENNOSIDES AND DOCUSATE SODIUM 1 TABLET: 8.6; 5 TABLET ORAL at 08:24

## 2024-04-05 RX ADMIN — POTASSIUM & SODIUM PHOSPHATES POWDER PACK 280-160-250 MG 1 PACKET: 280-160-250 PACK at 14:09

## 2024-04-05 RX ADMIN — ACETAMINOPHEN 975 MG: 325 TABLET ORAL at 14:08

## 2024-04-05 RX ADMIN — POTASSIUM & SODIUM PHOSPHATES POWDER PACK 280-160-250 MG 1 PACKET: 280-160-250 PACK at 10:49

## 2024-04-05 RX ADMIN — POTASSIUM CHLORIDE 20 MEQ: 1500 TABLET, EXTENDED RELEASE ORAL at 08:24

## 2024-04-05 RX ADMIN — HEPARIN SODIUM 5000 UNITS: 10000 INJECTION, SOLUTION INTRAVENOUS; SUBCUTANEOUS at 21:29

## 2024-04-05 RX ADMIN — FUROSEMIDE 40 MG: 10 INJECTION, SOLUTION INTRAVENOUS at 14:08

## 2024-04-05 RX ADMIN — ROSUVASTATIN CALCIUM 20 MG: 10 TABLET, FILM COATED ORAL at 08:24

## 2024-04-05 RX ADMIN — ACETAMINOPHEN 975 MG: 325 TABLET ORAL at 20:18

## 2024-04-05 RX ADMIN — POTASSIUM & SODIUM PHOSPHATES POWDER PACK 280-160-250 MG 1 PACKET: 280-160-250 PACK at 06:51

## 2024-04-05 RX ADMIN — ASPIRIN 81 MG CHEWABLE TABLET 81 MG: 81 TABLET CHEWABLE at 08:24

## 2024-04-05 RX ADMIN — ACETAMINOPHEN 975 MG: 325 TABLET ORAL at 05:35

## 2024-04-05 RX ADMIN — FUROSEMIDE 40 MG: 10 INJECTION, SOLUTION INTRAMUSCULAR; INTRAVENOUS at 05:45

## 2024-04-05 RX ADMIN — MAGNESIUM HYDROXIDE 30 ML: 400 SUSPENSION ORAL at 15:49

## 2024-04-05 RX ADMIN — HEPARIN SODIUM 5000 UNITS: 10000 INJECTION, SOLUTION INTRAVENOUS; SUBCUTANEOUS at 05:35

## 2024-04-05 RX ADMIN — POLYETHYLENE GLYCOL 3350 17 G: 17 POWDER, FOR SOLUTION ORAL at 08:24

## 2024-04-05 RX ADMIN — PANTOPRAZOLE SODIUM 40 MG: 40 TABLET, DELAYED RELEASE ORAL at 08:24

## 2024-04-05 RX ADMIN — METOPROLOL TARTRATE 12.5 MG: 25 TABLET, FILM COATED ORAL at 20:18

## 2024-04-05 ASSESSMENT — ACTIVITIES OF DAILY LIVING (ADL)
ADLS_ACUITY_SCORE: 30
ADLS_ACUITY_SCORE: 26
ADLS_ACUITY_SCORE: 25
ADLS_ACUITY_SCORE: 26
ADLS_ACUITY_SCORE: 30
ADLS_ACUITY_SCORE: 30
ADLS_ACUITY_SCORE: 26
ADLS_ACUITY_SCORE: 30
ADLS_ACUITY_SCORE: 26
ADLS_ACUITY_SCORE: 30
ADLS_ACUITY_SCORE: 30
ADLS_ACUITY_SCORE: 31
ADLS_ACUITY_SCORE: 31
ADLS_ACUITY_SCORE: 25
ADLS_ACUITY_SCORE: 26
ADLS_ACUITY_SCORE: 30
ADLS_ACUITY_SCORE: 26
ADLS_ACUITY_SCORE: 30
ADLS_ACUITY_SCORE: 30

## 2024-04-05 NOTE — PLAN OF CARE
Vitally stable, denies pain, up in chair overnight, pt states he is unable to get comfortable in the bed.  Voiding appropriately, passing flatus, appetite good.    NSR on tele, incisions C/D/I.    Goal Outcome Evaluation:  Problem: Comorbidity Management  Goal: Blood Pressure in Desired Range  Outcome: Progressing  Intervention: Maintain Blood Pressure Management  Recent Flowsheet Documentation  Taken 4/5/2024 0025 by Jayne Patton, RN  Medication Review/Management: high-risk medications identified     Problem: Cardiovascular Surgery  Goal: Optimal Coping with Heart Surgery  Outcome: Progressing     Problem: Cardiovascular Surgery  Goal: Effective Cardiac Function  Outcome: Progressing

## 2024-04-05 NOTE — PROGRESS NOTES
"CVTS Daily Progress Note   POD#3 s/p ascending aortic replacement / LAAE  Attending: Randy  LOS: 3    SUBJECTIVE/INTERVAL EVENTS:    No acute events overnight. Normotensive. NSR. Maintaining O2 sats on room air. Patient progressing well. Ambulating with therapy. Reports \"no pain\". - BM / +flatus. Tolerating diet without nausea. UOP adequate. Chest tubes removed yesterday. Patient denies new chest pain, shortness of breath, abdominal pain, calf pain, nausea. Patient has no questions today.     OBJECTIVE:  Temp:  [97.8  F (36.6  C)-99.7  F (37.6  C)] 98.6  F (37  C)  Pulse:  [71-95] 77  Resp:  [18-29] 20  BP: (103-123)/(60-75) 123/75  SpO2:  [90 %-97 %] 96 %  Vitals:    04/02/24 0533 04/02/24 0548 04/04/24 0545 04/05/24 0542   Weight: 107.5 kg (237 lb) 106.6 kg (235 lb) 112.6 kg (248 lb 4.8 oz) 110.2 kg (242 lb 14.4 oz)       Clinically Significant Risk Factors              # Hypoalbuminemia: Lowest albumin = 3.2 g/dL at 4/3/2024  3:16 AM, will monitor as appropriate              # Obesity: Estimated body mass index is 34.85 kg/m  as calculated from the following:    Height as of this encounter: 1.778 m (5' 10\").    Weight as of this encounter: 110.2 kg (242 lb 14.4 oz)., PRESENT ON ADMISSION                     Current Medications:    Scheduled Meds:  Current Facility-Administered Medications   Medication Dose Route Frequency Provider Last Rate Last Admin    acetaminophen (TYLENOL) tablet 975 mg  975 mg Oral Q8H Sherron Cantu PA-C   975 mg at 04/05/24 0535    aspirin (ASA) chewable tablet 81 mg  81 mg Oral or NG Tube Daily Sherron Cantu PA-C   81 mg at 04/04/24 0823    furosemide (LASIX) injection 40 mg  40 mg Intravenous BID Sherron Cantu PA-C        heparin ANTICOAGULANT injection 5,000 Units  5,000 Units Subcutaneous Q8H Sherron Cantu PA-C   5,000 Units at 04/05/24 0535    Lidocaine (LIDOCARE) 4 % Patch 1-2 patch  1-2 patch Transdermal Q24H Sherron Cantu PA-C  "  1 patch at 04/03/24 1457    metoprolol tartrate (LOPRESSOR) half-tab 12.5 mg  12.5 mg Oral BID Sherron Cantu PA-C   12.5 mg at 04/04/24 2047    pantoprazole (PROTONIX) 2 mg/mL suspension 40 mg  40 mg Oral or NG Tube Daily Sherron Cantu PA-C        Or    pantoprazole (PROTONIX) EC tablet 40 mg  40 mg Oral Daily Sherron Cantu PA-C   40 mg at 04/04/24 0823    polyethylene glycol (MIRALAX) Packet 17 g  17 g Oral Daily Sherron Cantu PA-C   17 g at 04/04/24 0823    potassium & sodium phosphates (NEUTRA-PHOS) Packet 1 packet  1 packet Oral or Feeding Tube Q4H James Padilla MD   1 packet at 04/05/24 0651    potassium chloride carly ER (KLOR-CON M20) CR tablet 20 mEq  20 mEq Oral Once James Padilla MD        rosuvastatin (CRESTOR) tablet 20 mg  20 mg Oral Daily Sherron Cantu PA-C   20 mg at 04/04/24 0823    senna-docusate (SENOKOT-S/PERICOLACE) 8.6-50 MG per tablet 1 tablet  1 tablet Oral BID Sherron Cantu PA-C   1 tablet at 04/04/24 2046     Continuous Infusions:  Current Facility-Administered Medications   Medication Dose Route Frequency Provider Last Rate Last Admin     PRN Meds:.  Current Facility-Administered Medications   Medication Dose Route Frequency Provider Last Rate Last Admin    acetaminophen (TYLENOL) tablet 650 mg  650 mg Oral Q4H PRN Sherron Cantu PA-C        bisacodyl (DULCOLAX) suppository 10 mg  10 mg Rectal Daily PRN Sherron Cantu PA-C        calcium gluconate 1 g in 50 mL in sodium chloride intermittent infusion  1 g Intravenous Once PRN Sherron Cantu PA-C        calcium gluconate 2 g in  mL intermittent infusion  2 g Intravenous Once PRN Sherron Cantu PA-C        calcium gluconate 3 g in sodium chloride 0.9 % 100 mL intermittent infusion  3 g Intravenous Once PRN Sherron Cantu PA-C        glucose gel 15-30 g  15-30 g Oral Q15 Min PRN Sherron Cantu PA-C         Or    dextrose 50 % injection 25-50 mL  25-50 mL Intravenous Q15 Min PRN Sherron Cantu PA-C        Or    glucagon injection 1 mg  1 mg Subcutaneous Q15 Min PRN Sherron Cantu PA-C        glucose gel 15-30 g  15-30 g Oral Q15 Min PRN Sherron Cantu PA-C        Or    dextrose 50 % injection 25-50 mL  25-50 mL Intravenous Q15 Min PRN Sherron Cantu PA-C        Or    glucagon injection 1 mg  1 mg Subcutaneous Q15 Min PRN Sherron Cantu PA-C        hydrALAZINE (APRESOLINE) injection 10 mg  10 mg Intravenous Q30 Min PRN Sherron Cantu PA-C        lactated ringers BOLUS 250 mL  250 mL Intravenous Q15 Min PRN Sherron Cantu PA-C   250 mL at 04/03/24 0000    magnesium hydroxide (MILK OF MAGNESIA) suspension 30 mL  30 mL Oral Daily PRN Sherron Cantu PA-C        naloxone (NARCAN) injection 0.2 mg  0.2 mg Intravenous Q2 Min PRN Sherron Cantu PA-C        Or    naloxone (NARCAN) injection 0.4 mg  0.4 mg Intravenous Q2 Min PRN Sherron Cantu PA-C        Or    naloxone (NARCAN) injection 0.2 mg  0.2 mg Intramuscular Q2 Min PRN Sherron Cantu PA-C        Or    naloxone (NARCAN) injection 0.4 mg  0.4 mg Intramuscular Q2 Min PRN Sherron Cantu PA-C        ondansetron (ZOFRAN ODT) ODT tab 4 mg  4 mg Oral Q6H PRN Sherron Cantu PA-C        Or    ondansetron (ZOFRAN) injection 4 mg  4 mg Intravenous Q6H PRN Sherron Cantu PA-C        oxyCODONE (ROXICODONE) tablet 5 mg  5 mg Oral Q4H PRN Sherron Cantu PA-C        Or    oxyCODONE (ROXICODONE) tablet 10 mg  10 mg Oral Q4H PRN Sherron Cantu PA-C        prochlorperazine (COMPAZINE) injection 5 mg  5 mg Intravenous Q6H PRN Sherron Cantu PA-C        Or    prochlorperazine (COMPAZINE) tablet 5 mg  5 mg Oral Q6H PRN Sherron Cantu PA-C           Cardiographics:    Telemetry monitoring demonstrates NSR with rates in the 70s per  my personal review.    Imaging:  Results for orders placed or performed during the hospital encounter of 04/02/24   XR Chest Port 1 View    Impression    IMPRESSION: Poststernotomy changes with mediastinal chest tube. Endotracheal tube is 4 cm from the rafita.     NG tube can be seen coursing into the stomach. The proximal port is above the GE junction. Suggest advancing the tube another 4 to 5 cm.    Right IJ cordis overlies the proximal SVC.    Left lower lobe atelectasis silhouetting the left hemidiaphragm. There is a trace effusion. No pneumothorax. Right lung is clear. Heart is at the upper limits of normal.   XR Chest Port 1 View    Impression    IMPRESSION: Interval removal of nasogastric tube and endotracheal tube. Right IJ introducer and mediastinal drains remain. Cardiac silhouette is prominent but unchanged. Normal vascular volume with clear lungs. No pneumothorax or pleural effusion.       Labs, personally reviewed.  Hemoglobin   Date Value Ref Range Status   04/03/2024 11.7 (L) 13.3 - 17.7 g/dL Final   04/02/2024 13.0 (L) 13.3 - 17.7 g/dL Final   04/02/2024 11.9 (L) 13.3 - 17.7 g/dL Final     Hemoglobin POCT   Date Value Ref Range Status   04/02/2024 12.2 (L) 13.3 - 17.7 g/dL Final   04/02/2024 12.3 (L) 13.3 - 17.7 g/dL Final   04/02/2024 12.9 (L) 13.3 - 17.7 g/dL Final     WBC Count   Date Value Ref Range Status   04/03/2024 12.0 (H) 4.0 - 11.0 10e3/uL Final   04/02/2024 11.6 (H) 4.0 - 11.0 10e3/uL Final   04/02/2024 11.2 (H) 4.0 - 11.0 10e3/uL Final     Platelet Count   Date Value Ref Range Status   04/03/2024 157 150 - 450 10e3/uL Final   04/02/2024 149 (L) 150 - 450 10e3/uL Final   04/02/2024 132 (L) 150 - 450 10e3/uL Final     Creatinine   Date Value Ref Range Status   04/03/2024 0.90 0.67 - 1.17 mg/dL Final   04/02/2024 0.89 0.67 - 1.17 mg/dL Final   03/12/2024 0.92 0.67 - 1.17 mg/dL Final     Potassium   Date Value Ref Range Status   04/05/2024 3.5 3.4 - 5.3 mmol/L Final   04/04/2024 4.3 3.4 -  5.3 mmol/L Final   04/03/2024 4.2 3.4 - 5.3 mmol/L Final   05/17/2022 4.4 3.5 - 5.0 mmol/L Final   04/08/2022 3.8 3.5 - 5.0 mmol/L Final   03/22/2022 4.6 3.5 - 5.0 mmol/L Final     Potassium POCT   Date Value Ref Range Status   04/02/2024 4.6 3.4 - 5.3 mmol/L Final   04/02/2024 4.6 3.4 - 5.3 mmol/L Final   04/02/2024 5.6 (H) 3.4 - 5.3 mmol/L Final     Magnesium   Date Value Ref Range Status   04/04/2024 2.1 1.7 - 2.3 mg/dL Final   04/03/2024 2.2 1.7 - 2.3 mg/dL Final   04/02/2024 2.9 (H) 1.7 - 2.3 mg/dL Final          I/O:  I/O last 3 completed shifts:  In: 960 [P.O.:960]  Out: 3785 [Urine:3725; Chest Tube:60]       Physical Exam:    General: Patient seen up in chair. NAD. Conversant. Pleasant.   CV: RRR on monitor. 2+ peripheral pulses in all extremities. Mild edema.   Pulm: Non-labored effort on room air. Incision C/D/I.  Abd: Soft, NT, ND  Ext: Mild pedal edema, SCDs in place, warm, distal pulses intact  Neuro: CNs grossly intact.       ASSESSMENT/PLAN:    Bobby Nelson is a 68 year old male with a history of ascending aortic aneurysm who is s/p ascending aortic replacement / LAAE.    Principal Problem:    Other ill-defined heart diseases  Active Problems:    Aneurysm of ascending aorta without rupture (H24)        NEURO:   - Scheduled Tylenol/lidocaine patches and PRN Tylenol/oxycodone for pain    CV:   - Pre-op EF 55-60%  - Normotensive  - Metoprolol 12.5mg BID  - ASA 81mg  - Rosuvastatin 20mg daily  - Chest tubes and TPWs removed 4/4  - New baseline echo 4/4 with poor windows--consider outpatient?    PULM:   - Maintaining oxygen saturations on room air  - Encourage pulmonary toilet    FEN/GI:  - Continue electrolyte replacement protocol  - Diet: Cardiac, ADAT   - Bowel regimen    RENAL:  - Adequate UOP/hr. Continue to monitor closely.  - Diuresis with Lasix 40mg IV BID (decreased)    HEME:  - Acute blood loss anemia post-op.   - No bleeding concerns. Hep SQ, ASA    ID:  - Kim op ppx complete, afebrile. No  concerns for infection    ENDO:    - No issues; BG checks stopped    PPx:   - DVT: SCDs, SQ heparin TID, ambulation   - GI: Protonix 40mg PO daily    DISPO:   - General telemetry status; anticipate discharge to home tomorrow AM.        Patient discussed with Dr. Padilla.        _______  Sherron Cantu PA-C  Cardiothoracic Surgery  [phone]

## 2024-04-05 NOTE — PLAN OF CARE
[facility] - ICU          Pertinent Assessments:      Please refer to flowsheet rows for full assessment     VS/Pain             Key Events - This Shift:     VSS/afebrile. Sats mid 90's on RA. Pt denied pain all shift. Pt still hasn't had a BM post-op; Senna/Miralax/MOM given. Pt's abdomen is soft and he says he's passing a lot of gas. Pt showered this AM.           Barriers to Discharge / Downgrade:     Anticipate discharge Saturday         Point of Contact Update YES-OR-NO: Yes  Name: Gualberto  Phone Number: at bedside  Summary of Conversation: updated on POC

## 2024-04-05 NOTE — PROGRESS NOTES
Respiratory Treatment Plan     Patient Score: 9  Patient Acuity: 4    Clinical Indication for Therapy: prevent atelectasis    Therapy Ordered: IS independently Q1 with flutter      History:   -Smoking History: never   -Home Medications: no   -Home Oxygen: no   -JACINDA Hx no      Assessment Summary: POD#3 s/p ascending aortic replacement. Achieves [id number] with his IS. No smoking or pulmonary history to note. On room air and working with PT with ambulation.       Melisa Palacios, RT  4/5/2024

## 2024-04-05 NOTE — DISCHARGE SUMMARY
Cardiovascular Surgery Discharge Summary    Primary Care Physician:  Casa Garrett  Discharge Provider: Sherron Cantu PA-C  Admission Date: 4/2/2024  Admission Diagnoses: Aneurysm of ascending aorta without rupture (H24) [I71.21]  Other ill-defined heart diseases [I51.89]  Discharge Date: 04/06/2024  Disposition: Home  Condition at Discharge: Good  Code Status: Full Code     Principal Diagnosis:   Ascending aortic aneurysm s/p ascending aortic replacement    Discharge Diagnoses:    Active Problems:      Patient Active Problem List   Diagnosis    Aneurysm of ascending aorta without rupture (H24)    Status post coronary angiogram    Other ill-defined heart diseases         Consult/s: Dietary, critical care medicine    Surgery:   04/02/2024 with Dr. Padilla   Ascending aortic replacement with a 32 mm Gelweave graft from the sinotubular junction to the distal ascending aorta.  Left atrial appendage excision.      Discharge Medications:      Review of your medicines        START taking        Dose / Directions   acetaminophen 325 MG tablet  Commonly known as: TYLENOL  Used for: S/P ascending aortic aneurysm repair      Dose: 650 mg  Take 2 tablets (650 mg) by mouth every 4 hours as needed for other (For optimal non-opioid multimodal pain management to improve pain control.)  Quantity: 60 tablet  Refills: 0     aspirin 81 MG chewable tablet  Commonly known as: ASA  Used for: S/P ascending aortic aneurysm repair      Dose: 81 mg  Start taking on: April 7, 2024  1 tablet (81 mg) by Oral or NG Tube route daily  Quantity: 90 tablet  Refills: 3     furosemide 20 MG tablet  Commonly known as: LASIX  Used for: S/P ascending aortic aneurysm repair      Dose: 20 mg  Take 1 tablet (20 mg) by mouth daily  Quantity: 5 tablet  Refills: 0     metoprolol tartrate 25 MG tablet  Commonly known as: LOPRESSOR  Used for: S/P ascending aortic aneurysm repair      Dose: 12.5 mg  Take 0.5 tablets (12.5 mg) by mouth 2 times  daily  Quantity: 90 tablet  Refills: 0     senna-docusate 8.6-50 MG tablet  Commonly known as: SENOKOT-S/PERICOLACE  Used for: S/P ascending aortic aneurysm repair      Dose: 1 tablet  Take 1 tablet by mouth 2 times daily as needed for constipation  Quantity: 30 tablet  Refills: 0            CONTINUE these medicines which have NOT CHANGED        Dose / Directions   fish oil-omega-3 fatty acids 500 MG capsule      Dose: 1,000 mg  Take 1,000 mg by mouth daily  Refills: 0     rosuvastatin 20 MG tablet  Commonly known as: CRESTOR      Dose: 20 mg  Take 20 mg by mouth daily  Refills: 0            STOP taking      losartan-hydrochlorothiazide 100-12.5 MG tablet  Commonly known as: HYZAAR                  Where to get your medicines        These medications were sent to [facility] [address] [address]      Phone: [phone]   acetaminophen 325 MG tablet  aspirin 81 MG chewable tablet  furosemide 20 MG tablet  metoprolol tartrate 25 MG tablet  senna-docusate 8.6-50 MG tablet         Discharge Instructions:    Follow up appointment with Primary Care Physician: Casa Garrett within 7 days of discharge  Follow up appointment with Specialist:    Follow with CV Surgery as scheduled.   Follow-up with cardiology as scheduled    Diet: Cardiac    Activity/Restrictions: As tolerated with sternal precautions in mind (see below). No driving for 4 weeks or while on pain medication.     - Shower and wash your incisions daily with soap and water. No tub baths/hot tubs for 4 weeks. An antibacterial soap such as Dial or Safeguard is recommended.    - Check your incisions every day. If you notice any redness, drainage, or anything unusual, please call the surgeons office.    - No driving for 4 weeks after surgery or while on pain medication     - Do not lift anything more than 10 pounds for 8 weeks after surgery. After 8 weeks, advance  "lifting gradually as tolerated.    - You may have watery drainage from your chest tube site for 2-3 weeks after surgery. Your may cover with a Band-Aid to protect your clothing. Remove the Band-Aid every day and wash the site.    - If you have a leg lesion, you may have swelling for 2-3 months. Elevate your leg any time you are not walking.    - If you feel any \"popping\" or \"clicking\" sensations in your chest, your arms are out too far or you are putting too much weight into arm movements. Do not reach over your head or out to the side to pull something. Do not do any arm exercises or use any exercise equipment that involves arm movement. If you feel your sternum moving, call the surgeon's office.    - Increase your daily activity as explained by Cardiac Rehab. You are encouraged to enroll in an Outpatient Cardiac Rehab Program.    - No active sports using your upper arms for 3 months. This includes fishing, hunting, bowling, swimming, tennis or golf.    - No physical activity such as cutting the grass, raking, vacuuming, changing sheets on your bed, snow shoveling, or using a  for 3 months.    - Use incentive spirometer 6-8 times per day for 2 weeks.       Hospital Summary:   Bobby Nelson is a 68 year old male who was admitted to [facility] on 4/2/2024 for elective ascending aortic replacement for known ascending aortic aneurysm.    Patient was deemed a candidate for surgery, and was taken to the operating room on 04/02/2024 where patient underwent the above procedures. Surgery was uneventful and patient was brought to the ICU post-operatively. He was extubated on POD#0 and weaned from pressors. Patient was awake and alert, afebrile, and with stable vitals. Insulin drip was discontinued and he was transitioned to a sliding scale. He was transferred to general telemetry status on POD#2 where patient has had return of bowel function, is maintaining oxygen saturations on room air, had his chest " "tubes removed, and has no complaints of chest pain or shortness of breath. On 04/06/24, patient was stable enough to be discharged to home.    Of note, new baseline echo in the hospital yielded sub-par results due to poor acoustic windows. Will order outpatient echo when we see him in clinic.     Patient has mild bilateral lower extremity edema and is slightly weight up at discharge; due to this he will be sent with 5 days of Lasix therapy.      Vital signs:  Temp: 98.1  F (36.7  C) Temp src: Oral BP: (!) 146/75 Pulse: 85   Resp: 20 SpO2: 96 % O2 Device: None (Room air) Oxygen Delivery: 1 LPM Height: 177.8 cm (5' 10\") Weight: 108.6 kg (239 lb 6.4 oz)  Estimated body mass index is 34.35 kg/m  as calculated from the following:    Height as of this encounter: 1.778 m (5' 10\").    Weight as of this encounter: 108.6 kg (239 lb 6.4 oz).          Physical Exam:    Pertinent exam findings on day of discharge include:  Gen: Seen up in chair. NAD. Pleasant and conversant.   CV: RRR on monitor. Mild bilateral lower extremity edema.  Pulm: Non-labored breathing on room air.  Abd: Soft, non-tender, non-distended  Neuro: CNs grossly intact  Inc: C/D/I      _______  Sherron Cantu PA-C  Cardiothoracic Surgery  [phone]      "

## 2024-04-05 NOTE — PROGRESS NOTES
"Care Management Follow Up    Length of Stay (days): 3    Expected Discharge Date: 04/06/2024     Concerns to be Addressed:    discharge planning    Patient plan of care discussed at interdisciplinary rounds: Yes    Anticipated Discharge Disposition:       Anticipated Discharge Services:    Education Provided on the Discharge Plan:  Per Care Team   Patient/Family in Agreement with the Plan:  Yes    Referrals Placed by CM/SW:    Private pay costs discussed: Not applicable    Additional Information:  Chart reviewed.    Cm updates:  POD#3 s/p ascending aortic replacement / LAAE   Therapy recs home with assist, and OP CR which spouse can provide.       Social hx:  \"Patient reports he lives with his spouse in their house. He is independent with ADLs/IADLs, ambulates without devices, drives and works part-time. Spouse is primary family contact. Family will transport at discharge. \"      Cm will continue to follow plan of care,review recommendations, and assist with any discharge needs anticipated.       Radha Galaviz RN      "

## 2024-04-06 ENCOUNTER — APPOINTMENT (OUTPATIENT)
Dept: OCCUPATIONAL THERAPY | Facility: HOSPITAL | Age: 69
DRG: 220 | End: 2024-04-06
Attending: SURGERY
Payer: MEDICARE

## 2024-04-06 VITALS
RESPIRATION RATE: 20 BRPM | SYSTOLIC BLOOD PRESSURE: 122 MMHG | OXYGEN SATURATION: 92 % | HEART RATE: 85 BPM | TEMPERATURE: 98 F | DIASTOLIC BLOOD PRESSURE: 66 MMHG | WEIGHT: 239.4 LBS | HEIGHT: 70 IN | BODY MASS INDEX: 34.27 KG/M2

## 2024-04-06 LAB
CA-I BLD-MCNC: 4.4 MG/DL (ref 4.4–5.2)
CA-I BLD-MCNC: 4.4 MG/DL (ref 4.4–5.2)
MAGNESIUM SERPL-MCNC: 2 MG/DL (ref 1.7–2.3)
PHOSPHATE SERPL-MCNC: 2.5 MG/DL (ref 2.5–4.5)
PLATELET # BLD AUTO: 165 10E3/UL (ref 150–450)
POTASSIUM SERPL-SCNC: 4.3 MMOL/L (ref 3.4–5.3)

## 2024-04-06 PROCEDURE — 97535 SELF CARE MNGMENT TRAINING: CPT | Mod: GO

## 2024-04-06 PROCEDURE — 84132 ASSAY OF SERUM POTASSIUM: CPT | Performed by: SURGERY

## 2024-04-06 PROCEDURE — 36415 COLL VENOUS BLD VENIPUNCTURE: CPT | Performed by: SURGERY

## 2024-04-06 PROCEDURE — 250N000013 HC RX MED GY IP 250 OP 250 PS 637: Performed by: PHYSICIAN ASSISTANT

## 2024-04-06 PROCEDURE — 83735 ASSAY OF MAGNESIUM: CPT | Performed by: SURGERY

## 2024-04-06 PROCEDURE — 82330 ASSAY OF CALCIUM: CPT | Performed by: PHYSICIAN ASSISTANT

## 2024-04-06 PROCEDURE — 250N000011 HC RX IP 250 OP 636: Performed by: PHYSICIAN ASSISTANT

## 2024-04-06 PROCEDURE — 36415 COLL VENOUS BLD VENIPUNCTURE: CPT | Performed by: PHYSICIAN ASSISTANT

## 2024-04-06 PROCEDURE — 85049 AUTOMATED PLATELET COUNT: CPT | Performed by: PHYSICIAN ASSISTANT

## 2024-04-06 PROCEDURE — 84100 ASSAY OF PHOSPHORUS: CPT | Performed by: SURGERY

## 2024-04-06 RX ORDER — FUROSEMIDE 20 MG
20 TABLET ORAL DAILY
Qty: 5 TABLET | Refills: 0 | Status: SHIPPED | OUTPATIENT
Start: 2024-04-06 | End: 2024-05-03

## 2024-04-06 RX ORDER — AMOXICILLIN 250 MG
1 CAPSULE ORAL 2 TIMES DAILY PRN
Qty: 30 TABLET | Refills: 0 | Status: SHIPPED | OUTPATIENT
Start: 2024-04-06 | End: 2024-09-11

## 2024-04-06 RX ORDER — ASPIRIN 81 MG/1
81 TABLET, CHEWABLE ORAL DAILY
Qty: 90 TABLET | Refills: 3 | Status: SHIPPED | OUTPATIENT
Start: 2024-04-07

## 2024-04-06 RX ORDER — MAGNESIUM OXIDE 400 MG/1
400 TABLET ORAL EVERY 4 HOURS
Status: COMPLETED | OUTPATIENT
Start: 2024-04-06 | End: 2024-04-06

## 2024-04-06 RX ORDER — ACETAMINOPHEN 325 MG/1
650 TABLET ORAL EVERY 4 HOURS PRN
Qty: 60 TABLET | Refills: 0 | Status: SHIPPED | OUTPATIENT
Start: 2024-04-06 | End: 2024-09-11

## 2024-04-06 RX ORDER — METOPROLOL TARTRATE 25 MG/1
12.5 TABLET, FILM COATED ORAL 2 TIMES DAILY
Qty: 90 TABLET | Refills: 0 | Status: SHIPPED | OUTPATIENT
Start: 2024-04-06 | End: 2024-05-03

## 2024-04-06 RX ADMIN — METOPROLOL TARTRATE 12.5 MG: 25 TABLET, FILM COATED ORAL at 08:10

## 2024-04-06 RX ADMIN — CALCIUM GLUCONATE 1 G: 20 INJECTION, SOLUTION INTRAVENOUS at 05:54

## 2024-04-06 RX ADMIN — CALCIUM GLUCONATE 1 G: 20 INJECTION, SOLUTION INTRAVENOUS at 12:11

## 2024-04-06 RX ADMIN — MAGNESIUM HYDROXIDE 30 ML: 400 SUSPENSION ORAL at 07:26

## 2024-04-06 RX ADMIN — SENNOSIDES AND DOCUSATE SODIUM 1 TABLET: 8.6; 5 TABLET ORAL at 08:10

## 2024-04-06 RX ADMIN — ROSUVASTATIN CALCIUM 20 MG: 10 TABLET, FILM COATED ORAL at 08:10

## 2024-04-06 RX ADMIN — ACETAMINOPHEN 975 MG: 325 TABLET ORAL at 04:04

## 2024-04-06 RX ADMIN — PANTOPRAZOLE SODIUM 40 MG: 40 TABLET, DELAYED RELEASE ORAL at 08:09

## 2024-04-06 RX ADMIN — POTASSIUM & SODIUM PHOSPHATES POWDER PACK 280-160-250 MG 1 PACKET: 280-160-250 PACK at 11:24

## 2024-04-06 RX ADMIN — ACETAMINOPHEN 975 MG: 325 TABLET ORAL at 11:25

## 2024-04-06 RX ADMIN — MAGNESIUM OXIDE TAB 400 MG (241.3 MG ELEMENTAL MG) 400 MG: 400 (241.3 MG) TAB at 08:10

## 2024-04-06 RX ADMIN — POTASSIUM & SODIUM PHOSPHATES POWDER PACK 280-160-250 MG 1 PACKET: 280-160-250 PACK at 08:15

## 2024-04-06 RX ADMIN — MAGNESIUM OXIDE TAB 400 MG (241.3 MG ELEMENTAL MG) 400 MG: 400 (241.3 MG) TAB at 11:24

## 2024-04-06 RX ADMIN — POLYETHYLENE GLYCOL 3350 17 G: 17 POWDER, FOR SOLUTION ORAL at 08:10

## 2024-04-06 RX ADMIN — FUROSEMIDE 40 MG: 10 INJECTION, SOLUTION INTRAVENOUS at 08:10

## 2024-04-06 RX ADMIN — ASPIRIN 81 MG CHEWABLE TABLET 81 MG: 81 TABLET CHEWABLE at 08:09

## 2024-04-06 RX ADMIN — BISACODYL 10 MG: 10 SUPPOSITORY RECTAL at 12:14

## 2024-04-06 RX ADMIN — HEPARIN SODIUM 5000 UNITS: 10000 INJECTION, SOLUTION INTRAVENOUS; SUBCUTANEOUS at 05:55

## 2024-04-06 ASSESSMENT — ACTIVITIES OF DAILY LIVING (ADL)
ADLS_ACUITY_SCORE: 29
ADLS_ACUITY_SCORE: 25
ADLS_ACUITY_SCORE: 29
ADLS_ACUITY_SCORE: 25

## 2024-04-06 NOTE — PLAN OF CARE
Cardiac Rehab Discharge Summary    Reason for therapy discharge:    Discharged home.    Progress towards therapy goal(s). See goals on Care Plan in [facility] electronic health record for goal details.  Goals met.    Therapy recommendation(s):    Outpt CR

## 2024-04-06 NOTE — PLAN OF CARE
"  Problem: Adult Inpatient Plan of Care  Goal: Plan of Care Review  Description: The Plan of Care Review/Shift note should be completed every shift.  The Outcome Evaluation is a brief statement about your assessment that the patient is improving, declining, or no change.  This information will be displayed automatically on your shift  note.  Outcome: Adequate for Care Transition  Goal: Patient-Specific Goal (Individualized)  Description: You can add care plan individualizations to a care plan. Examples of Individualization might be:  \"Parent requests to be called daily at 9am for status\", \"I have a hard time hearing out of my right ear\", or \"Do not touch me to wake me up as it startles  me\".  Outcome: Adequate for Care Transition  Goal: Absence of Hospital-Acquired Illness or Injury  Outcome: Adequate for Care Transition  Intervention: Identify and Manage Fall Risk  Recent Flowsheet Documentation  Taken 4/6/2024 1304 by Terri Murillo RN  Safety Promotion/Fall Prevention: activity supervised  Taken 4/6/2024 0900 by Terri Murillo RN  Safety Promotion/Fall Prevention: activity supervised  Goal: Optimal Comfort and Wellbeing  Outcome: Adequate for Care Transition  Goal: Readiness for Transition of Care  Outcome: Adequate for Care Transition     Problem: Comorbidity Management  Goal: Blood Pressure in Desired Range  Outcome: Adequate for Care Transition  Intervention: Maintain Blood Pressure Management  Recent Flowsheet Documentation  Taken 4/6/2024 1304 by Terri Murillo RN  Medication Review/Management: medications reviewed  Taken 4/6/2024 0900 by Terri Murillo RN  Medication Review/Management: medications reviewed     Problem: Cardiovascular Surgery  Goal: Improved Activity Tolerance  4/6/2024 1419 by Terri Murillo RN  Outcome: Adequate for Care Transition  4/6/2024 1307 by Terri Murillo, RN  Outcome: Progressing  Goal: Optimal Coping with Heart Surgery  Outcome: Adequate for Care Transition  Goal: Absence " of Bleeding  4/6/2024 1419 by Terri Murillo RN  Outcome: Adequate for Care Transition  4/6/2024 1307 by Terri Murillo RN  Outcome: Progressing  Goal: Effective Bowel Elimination  Outcome: Adequate for Care Transition  Goal: Effective Cardiac Function  4/6/2024 1419 by Terri Murillo RN  Outcome: Adequate for Care Transition  4/6/2024 1307 by Terri Murillo RN  Outcome: Progressing  Goal: Optimal Cerebral Tissue Perfusion  4/6/2024 1419 by Terri Murillo RN  Outcome: Adequate for Care Transition  4/6/2024 1307 by Terri Murillo RN  Outcome: Progressing  Goal: Fluid and Electrolyte Balance  Outcome: Adequate for Care Transition  Goal: Blood Glucose Level Within Targeted Range  Outcome: Adequate for Care Transition  Goal: Absence of Infection Signs and Symptoms  Outcome: Adequate for Care Transition  Goal: Anesthesia/Sedation Recovery  Outcome: Adequate for Care Transition  Intervention: Optimize Anesthesia Recovery  Recent Flowsheet Documentation  Taken 4/6/2024 1304 by Terri Murillo RN  Safety Promotion/Fall Prevention: activity supervised  Taken 4/6/2024 0900 by Terri Murillo RN  Safety Promotion/Fall Prevention: activity supervised  Goal: Acceptable Pain Control  Outcome: Adequate for Care Transition  Goal: Nausea and Vomiting Relief  Outcome: Adequate for Care Transition  Goal: Effective Urinary Elimination  Outcome: Adequate for Care Transition  Goal: Effective Oxygenation and Ventilation  Outcome: Adequate for Care Transition     Problem: Fall Injury Risk  Goal: Absence of Fall and Fall-Related Injury  Outcome: Adequate for Care Transition  Intervention: Identify and Manage Contributors  Recent Flowsheet Documentation  Taken 4/6/2024 1304 by Terri Murillo RN  Medication Review/Management: medications reviewed  Taken 4/6/2024 0900 by Terri Murillo RN  Medication Review/Management: medications reviewed  Intervention: Promote Injury-Free Environment  Recent Flowsheet Documentation  Taken 4/6/2024  1304 by Terri Murillo, RN  Safety Promotion/Fall Prevention: activity supervised  Taken 4/6/2024 0900 by Terri Murillo, RN  Safety Promotion/Fall Prevention: activity supervised     Problem: Restraint, Nonviolent  Goal: Absence of Harm or Injury  Outcome: Adequate for Care Transition   Goal Outcome Evaluation:    Went over discharge instructions with pt and wife.  Gave them their medication from [facility].  Left at 1420.

## 2024-04-06 NOTE — PROGRESS NOTES
"CVTS Daily Progress Note   POD#3 s/p ascending aortic replacement / LAAE  Attending: Randy  LOS: 4    SUBJECTIVE/INTERVAL EVENTS:    No acute events overnight. Normotensive. NSR. Maintaining O2 sats on room air. Patient progressing well. Ambulating with therapy. Reports \"no pain\". - BM / +flatus. Tolerating diet without nausea. UOP adequate. Patient denies new chest pain, shortness of breath, abdominal pain, calf pain, nausea. Patient has no questions today.     OBJECTIVE:  Temp:  [98.1  F (36.7  C)-99.7  F (37.6  C)] 98.1  F (36.7  C)  Pulse:  [75-88] 85  Resp:  [18-22] 20  BP: (111-146)/(67-78) 146/75  SpO2:  [90 %-96 %] 96 %  Vitals:    04/02/24 0533 04/02/24 0548 04/04/24 0545 04/05/24 0542   Weight: 107.5 kg (237 lb) 106.6 kg (235 lb) 112.6 kg (248 lb 4.8 oz) 110.2 kg (242 lb 14.4 oz)    04/06/24 0336   Weight: 108.6 kg (239 lb 6.4 oz)       Clinically Significant Risk Factors              # Hypoalbuminemia: Lowest albumin = 3.2 g/dL at 4/3/2024  3:16 AM, will monitor as appropriate              # Obesity: Estimated body mass index is 34.35 kg/m  as calculated from the following:    Height as of this encounter: 1.778 m (5' 10\").    Weight as of this encounter: 108.6 kg (239 lb 6.4 oz).                      Current Medications:    Scheduled Meds:  Current Facility-Administered Medications   Medication Dose Route Frequency Provider Last Rate Last Admin    acetaminophen (TYLENOL) tablet 975 mg  975 mg Oral Q8H Sherron Cantu PA-C   975 mg at 04/06/24 0404    aspirin (ASA) chewable tablet 81 mg  81 mg Oral or NG Tube Daily Sherron Cantu PA-C   81 mg at 04/06/24 0809    furosemide (LASIX) injection 40 mg  40 mg Intravenous BID Sherron Cantu PA-C   40 mg at 04/06/24 0810    heparin ANTICOAGULANT injection 5,000 Units  5,000 Units Subcutaneous Q8H Sherron Cantu PA-C   5,000 Units at 04/06/24 0555    Lidocaine (LIDOCARE) 4 % Patch 1-2 patch  1-2 patch Transdermal Q24H Pepe, " Sherron Ferrara PA-C   1 patch at 04/03/24 1457    magnesium oxide (MAG-OX) tablet 400 mg  400 mg Oral Q4H Sherron Cantu PA-C   400 mg at 04/06/24 0810    metoprolol tartrate (LOPRESSOR) half-tab 12.5 mg  12.5 mg Oral BID Sherron Cantu PA-C   12.5 mg at 04/06/24 0810    pantoprazole (PROTONIX) 2 mg/mL suspension 40 mg  40 mg Oral or NG Tube Daily Sherron Cantu PA-C        Or    pantoprazole (PROTONIX) EC tablet 40 mg  40 mg Oral Daily Sherron Cantu PA-C   40 mg at 04/06/24 0809    polyethylene glycol (MIRALAX) Packet 17 g  17 g Oral Daily Sherron Cantu PA-C   17 g at 04/06/24 0810    potassium & sodium phosphates (NEUTRA-PHOS) Packet 1 packet  1 packet Oral or Feeding Tube Q4H Sherron Cantu PA-C   1 packet at 04/06/24 0815    rosuvastatin (CRESTOR) tablet 20 mg  20 mg Oral Daily Sherron Cantu PA-C   20 mg at 04/06/24 0810    senna-docusate (SENOKOT-S/PERICOLACE) 8.6-50 MG per tablet 1 tablet  1 tablet Oral BID Sherron Cantu PA-C   1 tablet at 04/06/24 0810     Continuous Infusions:  Current Facility-Administered Medications   Medication Dose Route Frequency Provider Last Rate Last Admin     PRN Meds:.  Current Facility-Administered Medications   Medication Dose Route Frequency Provider Last Rate Last Admin    acetaminophen (TYLENOL) tablet 650 mg  650 mg Oral Q4H PRN Sherron Cantu PA-C        bisacodyl (DULCOLAX) suppository 10 mg  10 mg Rectal Daily PRN Sherron Cantu PA-C        calcium gluconate 1 g in 50 mL in sodium chloride intermittent infusion  1 g Intravenous Once PRN Sherron Cantu PA-C   1 g at 04/06/24 0554    calcium gluconate 2 g in  mL intermittent infusion  2 g Intravenous Once PRN Sherron Cantu PA-C        calcium gluconate 3 g in sodium chloride 0.9 % 100 mL intermittent infusion  3 g Intravenous Once PRN Sherron Cantu PA-C        glucose gel 15-30 g  15-30 g Oral  Q15 Min PRN Sherron Cantu PA-C        Or    dextrose 50 % injection 25-50 mL  25-50 mL Intravenous Q15 Min PRN Sherron Cantu PA-C        Or    glucagon injection 1 mg  1 mg Subcutaneous Q15 Min PRN Sherron Cantu PA-C        hydrALAZINE (APRESOLINE) injection 10 mg  10 mg Intravenous Q30 Min PRN Sherron Cantu PA-C        lactated ringers BOLUS 250 mL  250 mL Intravenous Q15 Min PRN Sherron Cantu PA-C   250 mL at 04/03/24 0000    magnesium hydroxide (MILK OF MAGNESIA) suspension 30 mL  30 mL Oral Daily PRN Sherron Cantu PA-C   30 mL at 04/06/24 0726    naloxone (NARCAN) injection 0.2 mg  0.2 mg Intravenous Q2 Min PRN Sherron Cantu PA-C        Or    naloxone (NARCAN) injection 0.4 mg  0.4 mg Intravenous Q2 Min PRN Sherron Cantu PA-C        Or    naloxone (NARCAN) injection 0.2 mg  0.2 mg Intramuscular Q2 Min PRN Sherron Cantu PA-C        Or    naloxone (NARCAN) injection 0.4 mg  0.4 mg Intramuscular Q2 Min PRN Sherron Cantu PA-C        ondansetron (ZOFRAN ODT) ODT tab 4 mg  4 mg Oral Q6H PRN Sherron Cnatu PA-C        Or    ondansetron (ZOFRAN) injection 4 mg  4 mg Intravenous Q6H PRN Sherron Cantu PA-C        oxyCODONE (ROXICODONE) tablet 5 mg  5 mg Oral Q4H PRN Sherron Cantu PA-C        Or    oxyCODONE (ROXICODONE) tablet 10 mg  10 mg Oral Q4H PRN Sherron Cantu PA-C        prochlorperazine (COMPAZINE) injection 5 mg  5 mg Intravenous Q6H PRN Sherron Cantu PA-C        Or    prochlorperazine (COMPAZINE) tablet 5 mg  5 mg Oral Q6H PRN Sherron Cantu PA-C           Cardiographics:    Telemetry monitoring demonstrates NSR with rates in the 80s per my personal review.    Imaging:  Results for orders placed or performed during the hospital encounter of 04/02/24   XR Chest Port 1 View    Impression    IMPRESSION: Poststernotomy changes with mediastinal chest tube.  Endotracheal tube is 4 cm from the rafita.     NG tube can be seen coursing into the stomach. The proximal port is above the GE junction. Suggest advancing the tube another 4 to 5 cm.    Right IJ cordis overlies the proximal SVC.    Left lower lobe atelectasis silhouetting the left hemidiaphragm. There is a trace effusion. No pneumothorax. Right lung is clear. Heart is at the upper limits of normal.   XR Chest Port 1 View    Impression    IMPRESSION: Interval removal of nasogastric tube and endotracheal tube. Right IJ introducer and mediastinal drains remain. Cardiac silhouette is prominent but unchanged. Normal vascular volume with clear lungs. No pneumothorax or pleural effusion.       Labs, personally reviewed.  Hemoglobin   Date Value Ref Range Status   04/03/2024 11.7 (L) 13.3 - 17.7 g/dL Final   04/02/2024 13.0 (L) 13.3 - 17.7 g/dL Final   04/02/2024 11.9 (L) 13.3 - 17.7 g/dL Final     Hemoglobin POCT   Date Value Ref Range Status   04/02/2024 12.2 (L) 13.3 - 17.7 g/dL Final   04/02/2024 12.3 (L) 13.3 - 17.7 g/dL Final   04/02/2024 12.9 (L) 13.3 - 17.7 g/dL Final     WBC Count   Date Value Ref Range Status   04/03/2024 12.0 (H) 4.0 - 11.0 10e3/uL Final   04/02/2024 11.6 (H) 4.0 - 11.0 10e3/uL Final   04/02/2024 11.2 (H) 4.0 - 11.0 10e3/uL Final     Platelet Count   Date Value Ref Range Status   04/06/2024 165 150 - 450 10e3/uL Final   04/03/2024 157 150 - 450 10e3/uL Final   04/02/2024 149 (L) 150 - 450 10e3/uL Final     Creatinine   Date Value Ref Range Status   04/03/2024 0.90 0.67 - 1.17 mg/dL Final   04/02/2024 0.89 0.67 - 1.17 mg/dL Final   03/12/2024 0.92 0.67 - 1.17 mg/dL Final     Potassium   Date Value Ref Range Status   04/06/2024 4.3 3.4 - 5.3 mmol/L Final   04/05/2024 3.5 3.4 - 5.3 mmol/L Final   04/04/2024 4.3 3.4 - 5.3 mmol/L Final   05/17/2022 4.4 3.5 - 5.0 mmol/L Final   04/08/2022 3.8 3.5 - 5.0 mmol/L Final   03/22/2022 4.6 3.5 - 5.0 mmol/L Final     Potassium POCT   Date Value Ref Range  Status   04/02/2024 4.6 3.4 - 5.3 mmol/L Final   04/02/2024 4.6 3.4 - 5.3 mmol/L Final   04/02/2024 5.6 (H) 3.4 - 5.3 mmol/L Final     Magnesium   Date Value Ref Range Status   04/06/2024 2.0 1.7 - 2.3 mg/dL Final   04/04/2024 2.1 1.7 - 2.3 mg/dL Final   04/03/2024 2.2 1.7 - 2.3 mg/dL Final          I/O:  I/O last 3 completed shifts:  In: 1080 [P.O.:1080]  Out: 1100 [Urine:1100]       Physical Exam:    General: Patient seen up in chair. NAD. Conversant. Pleasant.   CV: RRR on monitor. 2+ peripheral pulses in all extremities. Mild edema.   Pulm: Non-labored effort on room air. Incision C/D/I.  Abd: Soft, NT, ND  Ext: Mild pedal edema, SCDs in place, warm, distal pulses intact  Neuro: CNs grossly intact.       ASSESSMENT/PLAN:    Bobby Nelson is a 68 year old male with a history of ascending aortic aneurysm who is s/p ascending aortic replacement / LAAE.    Principal Problem:    Other ill-defined heart diseases  Active Problems:    Aneurysm of ascending aorta without rupture (H24)        NEURO:   - Scheduled Tylenol/lidocaine patches and PRN Tylenol/oxycodone for pain    CV:   - Pre-op EF 55-60%  - Normotensive  - Metoprolol 12.5mg BID  - ASA 81mg  - Rosuvastatin 20mg daily  - Chest tubes and TPWs removed 4/4  - New baseline echo 4/4 with poor windows--plan for outpatient    PULM:   - Maintaining oxygen saturations on room air  - Encourage pulmonary toilet    FEN/GI:  - Continue electrolyte replacement protocol  - Diet: Cardiac, ADAT   - Bowel regimen    RENAL:  - Adequate UOP/hr. Continue to monitor closely.  - Diuresis with Lasix 40mg IV BID    HEME:  - Acute blood loss anemia post-op.   - No bleeding concerns. Hep SQ, ASA    ID:  - Kmi op ppx complete, afebrile. No concerns for infection    ENDO:    - No issues; BG checks stopped    PPx:   - DVT: SCDs, SQ heparin TID, ambulation   - GI: Protonix 40mg PO daily    DISPO:   - General telemetry status; anticipate discharge to home today or tomorrow.         Patient  discussed with Dr. Padilla.        _______  Sherron Cantu PA-C  Cardiothoracic Surgery  [phone]

## 2024-04-06 NOTE — PLAN OF CARE
Goal Outcome Evaluation:                    sc  Patient Name: Bobby Nelson   MRN: [id number]   Date of Admission: 4/2/2024    Procedure: Procedure(s):  ASCENDING AORTIC ANEURYSM REPLACEMENT, LEFT ATRIAL APPENDAGE EXCISION  ANESTHESIA TRANSESOPHAGEAL ECHOCARDIOGRAM    Post Op day #:4    Subjective (Patient focus/Primary Problem for shift): sleep          Pain Goal 0 Pain Rating 0           Pain Medication/ Regime effective to reduce patient pain scheduled tylenol    Objective (Physical assessment):           Rhythm: normal sinus rhythm            Bowel Activity: no if Yes indicate when: n/a          Bowel Medications: plan to give prn milk of magnesia when patient gets up in am            Incision: healing well          Incentive Spirometry Q 1-2 hour when awake:  yes Volume: 2250          Epicardial Pacing Wires:  no            Patient Activity:           Up to chair for meals: yes          Ambulation with RN x2 (Not including CR): not applicable            Is patient in home clothes:no             Chest Tubes   Pleural: no Draining: not applicable               Suction: not applicable              Mediastinal: no Draining: not applicable               Suction: not applicable   Dressing Change Daily:yes If No, why?n/a                     Urinary Catheter: no           Preventative WOC consult (need MD order): no       Assessment (Nursing primary shift focus): Patient is alert and oriented.  Denied pain.  Lung sounds diminished.  Patient compliant with sternal precautions.    Plan (Patient Care Plan/focus): Patient sleeping in bed.  Get up to recliner during the day time.      Neli Christianson RN   4/6/2024   4:52 AM

## 2024-04-06 NOTE — PLAN OF CARE
"  Patient is A/O, denies pain, VSS, and is SBA.  Patient denies chest discomfort and SOB.  Patient transferred to P3 from ICU in the 2100 hour.  Patient is POD day 3 from an Ascending Aorta Replacement and Left Atrial Appendage Excision procedure.  Patient's chest incision is open to air and WDL.  Patient reports a numb left pinky finger, and stated it has been reported since surgery.  Patient is up in the recliner with SCD on.  Patient is able to make his needs known.  Patient has not had a BM since the surgery, and is willing to try Milk of Mag and suppository tomorrow morning.    Tele: NSR    Problem: Adult Inpatient Plan of Care  Goal: Plan of Care Review  Description: The Plan of Care Review/Shift note should be completed every shift.  The Outcome Evaluation is a brief statement about your assessment that the patient is improving, declining, or no change.  This information will be displayed automatically on your shift  note.  Outcome: Progressing  Goal: Patient-Specific Goal (Individualized)  Description: You can add care plan individualizations to a care plan. Examples of Individualization might be:  \"Parent requests to be called daily at 9am for status\", \"I have a hard time hearing out of my right ear\", or \"Do not touch me to wake me up as it startles  me\".  Outcome: Progressing  Goal: Absence of Hospital-Acquired Illness or Injury  Outcome: Progressing  Intervention: Identify and Manage Fall Risk  Recent Flowsheet Documentation  Taken 4/5/2024 2105 by Obinna Otero, RN  Safety Promotion/Fall Prevention:   activity supervised   assistive device/personal items within reach   mobility aid in reach   nonskid shoes/slippers when out of bed   patient and family education   room door open   room near nurse's station   safety round/check completed   toileting scheduled  Intervention: Prevent and Manage VTE (Venous Thromboembolism) Risk  Recent Flowsheet Documentation  Taken 4/5/2024 2142 by Obinna Otero, " RN  VTE Prevention/Management: SCDs (sequential compression devices) on  Taken 4/5/2024 2105 by Obinna Otero RN  VTE Prevention/Management: SCDs (sequential compression devices) off  Intervention: Prevent Infection  Recent Flowsheet Documentation  Taken 4/5/2024 2105 by Obinna Otero, RN  Infection Prevention:   environmental surveillance performed   equipment surfaces disinfected   hand hygiene promoted   personal protective equipment utilized   rest/sleep promoted   single patient room provided  Goal: Readiness for Transition of Care  Outcome: Progressing   Andrew Otero RN

## 2024-04-06 NOTE — PLAN OF CARE
Problem: Cardiovascular Surgery  Goal: Improved Activity Tolerance  Outcome: Progressing  Goal: Absence of Bleeding  Outcome: Progressing  Goal: Effective Cardiac Function  Outcome: Progressing  Goal: Optimal Cerebral Tissue Perfusion  Outcome: Progressing  Goal: Anesthesia/Sedation Recovery  Intervention: Optimize Anesthesia Recovery  Recent Flowsheet Documentation  Taken 4/6/2024 1304 by Terri Murillo, RN  Safety Promotion/Fall Prevention: activity supervised  Taken 4/6/2024 0900 by Terri Murillo, RN  Safety Promotion/Fall Prevention: activity supervised   Goal Outcome Evaluation:    POD #4. Vitals stable. On RA 96%.  No pain.  Incision intact.  Walked in hallway SBA.  Gave miralax, prune juice and suppository.  Calcium 4.4 gave 1 IV dose recheck at 1700.  Had shower.  Chest tube sites look good.  NSR.

## 2024-04-06 NOTE — PROGRESS NOTES
Patient transferred to room 325. All belongings sent with patient including cell phone and glasses. Reports called to Andrew suarez RN. All questions and concerns addressed. Patient stated he will call wife and inform her of transfer. Patient transferred via wheelchair and accompanied by RN.

## 2024-04-08 ENCOUNTER — PATIENT OUTREACH (OUTPATIENT)
Dept: CARE COORDINATION | Facility: CLINIC | Age: 69
End: 2024-04-08
Payer: MEDICARE

## 2024-04-08 NOTE — PROGRESS NOTES
Clinic Care Coordination Contact  Care Team Conversations    Patient identified for care management outreach, however [facility] RN staff has already followed up with patient to ensure they are following up with PCP and have needs and resources met. Clinic RN will refer back to YANI SMITH if needed/appropriate.    Christiane Meier, [facility]  Social Work Care Coordinator - [facility] [place]  Care Coordination  [email or web address]  Cell Phone: [phone]  Gender pronouns: she/her  Employed by [employer]

## 2024-04-09 ENCOUNTER — TELEPHONE (OUTPATIENT)
Dept: CARDIOLOGY | Facility: CLINIC | Age: 69
End: 2024-04-09
Payer: MEDICARE

## 2024-04-09 NOTE — TELEPHONE ENCOUNTER
Cardiovascular Surgery  [facility]    DISCHARGE FOLLOW UP PHONE CALL      POST OP MONITORING  How is your pain on a 0-10 scale, how are you managing your pain? Pt does not have any pain.    ACTIVITY  How is your activity tolerance? Up and walking well.  Are you still doing sternal precautions? Yes.  Do you hear any clicking when you are moving or taking a deep breath? No.    Are you weighing yourself daily? Yes pt is currently 235 lbs.    SIGNS AND SYMPTOMS OF INFECTION  INCREASE IN PAIN - No  FEVER - No  DRAINAGE - No If so, color: NA  REDNESS - No  SWELLING - No    ASSISTANCE  Do you have someone at home to assist you with your daily activities? Spouse is assisting pt at home.    MEDICATIONS  Is someone helping you to set up your medications? Pt is managing.  Do you have any questions about your medications? No.    Are you on a blood thinner? No.  Who is managing your INRs? NA    FOLLOW UP  You are scheduled to see your primary care physician on 4/16.  You are scheduled to see our surgery advanced practive provider for post operative follow up on 4/24.    You are scheduled for cardiac rehab on - pt will call to schedule.  You are scheduled to see your cardiologist on 5/3.    CONTACT INFORMATION  Please feel free to call us with any questions or symptoms that are concerning for you at [phone]. If it is after 4:00 in the afternoon, or a weekend please call [phone] and ask for the on call specialist. We want to do everything we can to help prevent you needing to return to the ED, so please do not hesitate to call us.    Kiana Durham RN  Cardiovascular Surgery  [phone]  April 9, 2024 12:07 PM        ----- Message from Kiana Durham RN sent at 4/8/2024  9:04 AM CDT -----  POC 4/8    ----- Message -----  From: Sherron Cantu PA-C  Sent: 4/6/2024   5:23 PM CDT  To: Kiana Durham RN    discharge to home today--needs outpt echo which we will order at time of clinic.

## 2024-04-10 NOTE — ADDENDUM NOTE
Addendum  created 04/10/24 1328 by Sorin Morales MD    Attestation recorded in Intraprocedure, Intraprocedure Attestations filed

## 2024-04-11 DIAGNOSIS — Z86.79 S/P ASCENDING AORTIC ANEURYSM REPAIR: ICD-10-CM

## 2024-04-11 DIAGNOSIS — R53.81 PHYSICAL DECONDITIONING: Primary | ICD-10-CM

## 2024-04-11 DIAGNOSIS — M62.81 GENERALIZED MUSCLE WEAKNESS: ICD-10-CM

## 2024-04-11 DIAGNOSIS — Z98.890 S/P ASCENDING AORTIC ANEURYSM REPAIR: ICD-10-CM

## 2024-04-16 ENCOUNTER — TRANSFERRED RECORDS (OUTPATIENT)
Dept: HEALTH INFORMATION MANAGEMENT | Facility: CLINIC | Age: 69
End: 2024-04-16
Payer: MEDICARE

## 2024-04-24 ENCOUNTER — OFFICE VISIT (OUTPATIENT)
Dept: CARDIOLOGY | Facility: CLINIC | Age: 69
End: 2024-04-24
Payer: MEDICARE

## 2024-04-24 ENCOUNTER — TELEPHONE (OUTPATIENT)
Dept: CARDIOLOGY | Facility: CLINIC | Age: 69
End: 2024-04-24

## 2024-04-24 VITALS
WEIGHT: 232 LBS | BODY MASS INDEX: 33.29 KG/M2 | DIASTOLIC BLOOD PRESSURE: 84 MMHG | RESPIRATION RATE: 14 BRPM | SYSTOLIC BLOOD PRESSURE: 142 MMHG | HEART RATE: 62 BPM

## 2024-04-24 DIAGNOSIS — Z86.79 S/P ASCENDING AORTIC ANEURYSM REPAIR: Primary | ICD-10-CM

## 2024-04-24 DIAGNOSIS — Z98.890 S/P ASCENDING AORTIC ANEURYSM REPAIR: Primary | ICD-10-CM

## 2024-04-24 PROCEDURE — 99024 POSTOP FOLLOW-UP VISIT: CPT | Performed by: PHYSICIAN ASSISTANT

## 2024-04-24 NOTE — LETTER
4/24/2024    Casa Garrett MD  [address]  [facility] [address]    RE: Bobby Nelson       Dear Colleague,     I had the pleasure of seeing Bobby Nelson in the [facility].  CARDIOTHORACIC SURGERY FOLLOW-UP VISIT     Bobby Nelson   1955   [id number]      Reason for visit: Post operative clinic visit. Patient underwent ascending aortic replacement with a 32 mm Gelweave graft from the sinotubular junction to the distal ascending aorta and left atrial appendage excision with Dr. Padilla on 4/2/24.     HPI: Bobyb Nelson is a 68 year old year old male seen in clinic for a routine follow-up appointment after surgery. Patient has past medical history as below. Hospital course was uneventful. Patient was discharged to home.    Patient has been doing well since discharge. Patient did follow-up with primary care since leaving the hospital. Reports that incision is healing well. Denies fevers, peripheral edema. Appetite is improving and patient is voiding without difficulty. Weight has been stable. Pain management at this point with tylenol. Patient has not been attending cardiac rehab dt insurance issues but is attending PT.  Patient is not on anti-coagulation.     PAST MEDICAL HISTORY:  Past Medical History:   Diagnosis Date    Asbestos exposure     Ascending aortic aneurysm (H24)     HLD (hyperlipidemia)     HTN (hypertension)     Thyroid nodule        PAST SURGICAL HISTORY:  Past Surgical History:   Procedure Laterality Date    CV CORONARY ANGIOGRAM N/A 03/14/2024    Procedure: Coronary Angiogram;  Surgeon: Jose Luis Jaffe MD;  Location: [facility] CV    CV LEFT HEART CATH N/A 03/14/2024    Procedure: Left Heart Catheterization;  Surgeon: Jose Luis Jaffe MD;  Location: [facility] CATH LAB CV    ELBOW SURGERY Right 03/01/2024    REPLACE VALVE AORTIC N/A 4/2/2024    Procedure: ASCENDING AORTIC ANEURYSM REPLACEMENT, LEFT ATRIAL APPENDAGE EXCISION;  Surgeon: James Padilla  MD Michael;  Location: [facility] OR    TONSILLECTOMY      TRANSESOPHAGEAL ECHOCARDIOGRAM INTRAOPERATIVE N/A 4/2/2024    Procedure: ANESTHESIA TRANSESOPHAGEAL ECHOCARDIOGRAM;  Surgeon: James Padilla MD;  Location: [facility] OR       CURRENT MEDICATIONS:     Current Outpatient Medications:     acetaminophen (TYLENOL) 325 MG tablet, Take 2 tablets (650 mg) by mouth every 4 hours as needed for other (For optimal non-opioid multimodal pain management to improve pain control.), Disp: 60 tablet, Rfl: 0    aspirin (ASA) 81 MG chewable tablet, 1 tablet (81 mg) by Oral or NG Tube route daily, Disp: 90 tablet, Rfl: 3    fish oil-omega-3 fatty acids 500 MG capsule, Take 1,000 mg by mouth daily, Disp: , Rfl:     furosemide (LASIX) 20 MG tablet, Take 1 tablet (20 mg) by mouth daily, Disp: 5 tablet, Rfl: 0    metoprolol tartrate (LOPRESSOR) 25 MG tablet, Take 0.5 tablets (12.5 mg) by mouth 2 times daily (Patient taking differently: Take 25 mg by mouth 2 times daily), Disp: 90 tablet, Rfl: 0    rosuvastatin (CRESTOR) 20 MG tablet, Take 20 mg by mouth daily, Disp: , Rfl:     senna-docusate (SENOKOT-S/PERICOLACE) 8.6-50 MG tablet, Take 1 tablet by mouth 2 times daily as needed for constipation, Disp: 30 tablet, Rfl: 0    ALLERGIES:    No Known Allergies    ROS:  Gen: No fevers, weight loss/gain  CV: Denies chest pain, peripheral edema  Pulm: Denies SOB  GI/: Voiding without problems, appetite improving.     LABS:  None    IMAGING:  None    PHYSICAL EXAM:   BP (!) 142/84 (BP Location: Right arm, Patient Position: Sitting, Cuff Size: Adult Regular)   Pulse 62   Resp 14   Wt 105.2 kg (232 lb)   BMI 33.29 kg/m    General: Alert and oriented, pleasant, no acute distress.  CV:  No peripheral edema.  Pulm: Easy work of breathing on room air.   GI: Soft, non-tender, and non-distended  Incision: Chest incisions clean dry and intact without erythema, swelling or drainage  Neuro: CNs grossly  intact.      ASSESSMENT/PLAN:  Bobby Nelson is a 68 year old year old male status post ascending aortic replacement with a 32 mm Gelweave graft from the sinotubular junction to the distal ascending aorta and left atrial appendage excision who returns to clinic for postop visit.     - Surgically doing well. Incisions are healing well with no signs of infection.  - Hemodynamics are stable. No medication changes were needed today.  - Follow up with your cardiologist as scheduled with Dr Rose 5/3 at 2:50 am  - Continue PT until completed.   - May start driving 5/2 (4 weeks post-op) if not using narcotic pain medications.  - Continue strict sternal precautions until 6/2. No lifting >10bs; may gradually increase at this point (8 weeks post-op).   - No need for further follow-up with CV surgery unless concerns. Feel free to call our office with questions. [phone]  - Follow up Echo to be scheduled         Joanie Escobar PA-C  [facility] Cardiothoracic Surgery  Vocera or pager [phone]          Thank you for allowing me to participate in the care of your patient.      Sincerely,     Joanie Escobar PA-C     [facility]  cc:   Casa Garrett MD  [address] [address]

## 2024-04-24 NOTE — PROGRESS NOTES
CARDIOTHORACIC SURGERY FOLLOW-UP VISIT     Bobby Nelson   1955   [id number]      Reason for visit: Post operative clinic visit. Patient underwent ascending aortic replacement with a 32 mm Gelweave graft from the sinotubular junction to the distal ascending aorta and left atrial appendage excision with Dr. Padilla on 4/2/24.     HPI: Bobby Nelson is a 68 year old year old male seen in clinic for a routine follow-up appointment after surgery. Patient has past medical history as below. Hospital course was uneventful. Patient was discharged to home.    Patient has been doing well since discharge. Patient did follow-up with primary care since leaving the hospital. Reports that incision is healing well. Denies fevers, peripheral edema. Appetite is improving and patient is voiding without difficulty. Weight has been stable. Pain management at this point with tylenol. Patient has not been attending cardiac rehab dt insurance issues but is attending PT.  Patient is not on anti-coagulation.     PAST MEDICAL HISTORY:  Past Medical History:   Diagnosis Date    Asbestos exposure     Ascending aortic aneurysm (H24)     HLD (hyperlipidemia)     HTN (hypertension)     Thyroid nodule        PAST SURGICAL HISTORY:  Past Surgical History:   Procedure Laterality Date    CV CORONARY ANGIOGRAM N/A 03/14/2024    Procedure: Coronary Angiogram;  Surgeon: Jose Luis Jaffe MD;  Location: [facility] CATH LAB CV    CV LEFT HEART CATH N/A 03/14/2024    Procedure: Left Heart Catheterization;  Surgeon: Jose Luis Jaffe MD;  Location: [facility] CATH LAB CV    ELBOW SURGERY Right 03/01/2024    REPLACE VALVE AORTIC N/A 4/2/2024    Procedure: ASCENDING AORTIC ANEURYSM REPLACEMENT, LEFT ATRIAL APPENDAGE EXCISION;  Surgeon: James Padilla MD;  Location: [facility] OR    TONSILLECTOMY      TRANSESOPHAGEAL ECHOCARDIOGRAM INTRAOPERATIVE N/A 4/2/2024    Procedure: ANESTHESIA TRANSESOPHAGEAL ECHOCARDIOGRAM;  Surgeon: Randy  James Rodriguez MD;  Location: [facility] OR       CURRENT MEDICATIONS:     Current Outpatient Medications:     acetaminophen (TYLENOL) 325 MG tablet, Take 2 tablets (650 mg) by mouth every 4 hours as needed for other (For optimal non-opioid multimodal pain management to improve pain control.), Disp: 60 tablet, Rfl: 0    aspirin (ASA) 81 MG chewable tablet, 1 tablet (81 mg) by Oral or NG Tube route daily, Disp: 90 tablet, Rfl: 3    fish oil-omega-3 fatty acids 500 MG capsule, Take 1,000 mg by mouth daily, Disp: , Rfl:     furosemide (LASIX) 20 MG tablet, Take 1 tablet (20 mg) by mouth daily, Disp: 5 tablet, Rfl: 0    metoprolol tartrate (LOPRESSOR) 25 MG tablet, Take 0.5 tablets (12.5 mg) by mouth 2 times daily (Patient taking differently: Take 25 mg by mouth 2 times daily), Disp: 90 tablet, Rfl: 0    rosuvastatin (CRESTOR) 20 MG tablet, Take 20 mg by mouth daily, Disp: , Rfl:     senna-docusate (SENOKOT-S/PERICOLACE) 8.6-50 MG tablet, Take 1 tablet by mouth 2 times daily as needed for constipation, Disp: 30 tablet, Rfl: 0    ALLERGIES:    No Known Allergies    ROS:  Gen: No fevers, weight loss/gain  CV: Denies chest pain, peripheral edema  Pulm: Denies SOB  GI/: Voiding without problems, appetite improving.     LABS:  None    IMAGING:  None    PHYSICAL EXAM:   BP (!) 142/84 (BP Location: Right arm, Patient Position: Sitting, Cuff Size: Adult Regular)   Pulse 62   Resp 14   Wt 105.2 kg (232 lb)   BMI 33.29 kg/m    General: Alert and oriented, pleasant, no acute distress.  CV:  No peripheral edema.  Pulm: Easy work of breathing on room air.   GI: Soft, non-tender, and non-distended  Incision: Chest incisions clean dry and intact without erythema, swelling or drainage  Neuro: CNs grossly intact.      ASSESSMENT/PLAN:  Bobby Nelson is a 68 year old year old male status post ascending aortic replacement with a 32 mm Gelweave graft from the sinotubular junction to the distal ascending aorta and left atrial  appendage excision who returns to clinic for postop visit.     - Surgically doing well. Incisions are healing well with no signs of infection.  - Hemodynamics are stable. No medication changes were needed today.  - Follow up with your cardiologist as scheduled with Dr Rose 5/3 at 2:50 am  - Continue PT until completed.   - May start driving 5/2 (4 weeks post-op) if not using narcotic pain medications.  - Continue strict sternal precautions until 6/2. No lifting >10bs; may gradually increase at this point (8 weeks post-op).   - No need for further follow-up with CV surgery unless concerns. Feel free to call our office with questions. [phone]  - Follow up Echo to be scheduled         Joanie Escobar PA-C  [facility] Cardiothoracic Surgery  Vocera or pager [phone]

## 2024-04-25 ENCOUNTER — TELEPHONE (OUTPATIENT)
Dept: CARDIOLOGY | Facility: CLINIC | Age: 69
End: 2024-04-25
Payer: MEDICARE

## 2024-04-30 ENCOUNTER — THERAPY VISIT (OUTPATIENT)
Dept: PHYSICAL THERAPY | Facility: REHABILITATION | Age: 69
End: 2024-04-30
Attending: SURGERY
Payer: MEDICARE

## 2024-04-30 ENCOUNTER — HOSPITAL ENCOUNTER (EMERGENCY)
Facility: HOSPITAL | Age: 69
Discharge: HOME | End: 2024-04-30
Attending: EMERGENCY MEDICINE | Admitting: EMERGENCY MEDICINE
Payer: MEDICARE

## 2024-04-30 VITALS
RESPIRATION RATE: 25 BRPM | TEMPERATURE: 98.1 F | WEIGHT: 221.78 LBS | HEIGHT: 69 IN | OXYGEN SATURATION: 96 % | BODY MASS INDEX: 32.85 KG/M2 | SYSTOLIC BLOOD PRESSURE: 171 MMHG | HEART RATE: 63 BPM | DIASTOLIC BLOOD PRESSURE: 95 MMHG

## 2024-04-30 DIAGNOSIS — Z86.79 S/P ASCENDING AORTIC ANEURYSM REPAIR: ICD-10-CM

## 2024-04-30 DIAGNOSIS — M62.81 GENERALIZED MUSCLE WEAKNESS: ICD-10-CM

## 2024-04-30 DIAGNOSIS — Z98.890 S/P ASCENDING AORTIC ANEURYSM REPAIR: ICD-10-CM

## 2024-04-30 DIAGNOSIS — I10 ASYMPTOMATIC HYPERTENSION: ICD-10-CM

## 2024-04-30 DIAGNOSIS — R53.81 PHYSICAL DECONDITIONING: ICD-10-CM

## 2024-04-30 PROCEDURE — 97162 PT EVAL MOD COMPLEX 30 MIN: CPT | Mod: GP | Performed by: PHYSICAL THERAPIST

## 2024-04-30 PROCEDURE — 93005 ELECTROCARDIOGRAM TRACING: CPT | Performed by: STUDENT IN AN ORGANIZED HEALTH CARE EDUCATION/TRAINING PROGRAM

## 2024-04-30 PROCEDURE — 99283 EMERGENCY DEPT VISIT LOW MDM: CPT

## 2024-04-30 ASSESSMENT — COLUMBIA-SUICIDE SEVERITY RATING SCALE - C-SSRS
6. HAVE YOU EVER DONE ANYTHING, STARTED TO DO ANYTHING, OR PREPARED TO DO ANYTHING TO END YOUR LIFE?: NO
2. HAVE YOU ACTUALLY HAD ANY THOUGHTS OF KILLING YOURSELF IN THE PAST MONTH?: NO
1. IN THE PAST MONTH, HAVE YOU WISHED YOU WERE DEAD OR WISHED YOU COULD GO TO SLEEP AND NOT WAKE UP?: NO

## 2024-04-30 ASSESSMENT — ACTIVITIES OF DAILY LIVING (ADL)
ADLS_ACUITY_SCORE: 38
ADLS_ACUITY_SCORE: 38

## 2024-04-30 NOTE — ED TRIAGE NOTES
Pt had Heart Surgery here on 4/2/24.  Today went to his 1st session of Cardiac Rehab but when Bp was checked it was elevated, Rehab not started and sent here .  Pt asymptomatic. Typical  Systolic  or lower per pt.  But had RUE  201/99; 198/124 and Left /100.  Pt still asymptomatic.

## 2024-04-30 NOTE — DISCHARGE INSTRUCTIONS
You were seen in the emergency department today for high blood pressure.  Your EKG today does not show any new changes.  You are overall asymptomatic and your exam today is reassuring.  As discussed, we typically do not do much for asymptomatic high blood pressure emergency department but it is important to follow-up with cardiology and your primary care provider to ensure that you do not have high blood pressure like this long-term. I recommend picking up a blood pressure cuff at any pharmacy and keeping a diary of your blood pressures at home so that when you see cardiology at the end of the week they have an idea of what your blood pressures have been throughout the week.    Continue taking your medications as prescribed until you see cardiology    Return to the emergency department if you develop any severe headaches, vision changes/loss, chest pain, shortness of breath, severe arm pain, or severe abdominal pain.

## 2024-04-30 NOTE — PROGRESS NOTES
"PHYSICAL THERAPY EVALUATION  Type of Visit: Evaluation    See electronic medical record for Abuse and Falls Screening details.    Subjective       Presenting condition or subjective complaint:    Date of onset: 04/02/24    Relevant medical history:     Dates & types of surgery:      Prior diagnostic imaging/testing results:       Prior therapy history for the same diagnosis, illness or injury:        Additional Subjective: Per EMR: \"Patient underwent ascending aortic replacement with a 32 mm Gelweave graft from the sinotubular junction to the distal ascending aorta and left atrial appendage excision with Dr. Padilla on 4/2/24.\"    Pt is not having cardiac rehab due to an insurance issue.     Prior Level of Function  Transfers: Independent  Ambulation: Independent    Living Environment  Social support:     Type of home:     Stairs to enter the home:         Ramp:     Stairs inside the home:         Help at home:    Equipment owned:       Employment:      Hobbies/Interests:      Patient goals for therapy:      Pain assessment: Pain denied     Objective     Cognitive Status Examination  Orientation: Oriented to person, place and time   Level of Consciousness: Alert  Follows Commands and Answers Questions: 100% of the time  Personal Safety and Judgement: Intact  Memory: Intact    OBSERVATION:   POSTURE: Sitting Posture: Rounded shoulders, Forward head   PALPATION:  NA  RANGE OF MOTION: LE ROM WNL  STRENGTH: LE Strength WNL    BED MOBILITY: Independent    TRANSFERS: Independent    GAIT:   Gait Description: Pt demos good pace and step length. Pt ambulates with no assistive device and is steady.     BALANCE: NT    VITALS:  Blood Pressure taken in seated:   Left UE:   170/100, , HR: 60 bpm  Right UE:  201/99, , HR: 62 bpm  198/124, , HR: 65 bpm  **Pt asymptomatic during session; no complaints, \"I feel fine\"**    SPECIAL TESTS--deferred due to elevated Blood Pressures    SENSATION: LE Sensation " WNL    REFLEXES: NT  COORDINATION: WNL  MUSCLE TONE: WNL      Assessment & Plan   CLINICAL IMPRESSIONS  Medical Diagnosis: Physical deconditioning , S/P ascending aortic aneurysm repair, Generalized muscle weakness    Treatment Diagnosis: Physical deconditioning , S/P ascending aortic aneurysm repair, Generalized muscle weakness   Impression/Assessment:  Pt comes to PT s/p aortic aneurysm repair on 4/2/24. During eval, PT takes pt's BP in seated and his values are significantly elevated. Due to this, PT recommends pt go to the ER to have addressed. Pt was asymptomatic and felt fine to drive across the street to the ER. PT provided BP readings to share with ER. Pt will benefit from skilled 1:1 PT to address activity tolerance limitations as a result of his recent heart surgery. Pt will benefit from skilled 1:1 PT.     Clinical Decision Making (Complexity):  Clinical Presentation: Unstable/Unpredictable   Clinical Presentation Rationale: based on medical and personal factors listed in PT evaluation  Clinical Decision Making (Complexity): Moderate complexity    PLAN OF CARE  Treatment Interventions:  Interventions: Gait Training, Neuromuscular Re-education, Therapeutic Activity, Therapeutic Exercise, Self-Care/Home Management    Long Term Goals     PT Goal 1  Goal Description: Pt will be independent with his HEP for ongoing symptom management in 12 weeks.  PT Goal 2  Goal Description: Pt will improve 6MWT by 150' from initial score in 12 weeks.  PT Goal 3  Goal Description: Pt will improve functional LE strength by 2 stands from initial score in 12 weeks.      Frequency of Treatment: 1-2x/wk  Duration of Treatment: 10 visits over 12 weeks    Recommended Referrals to Other Professionals:  PT recommends pt go to the ER due to elevated blood pressures at PT eval  Education Assessment:        Risks and benefits of evaluation/treatment have been explained.   Patient/Family/caregiver agrees with Plan of Care.     Evaluation  Time:     PT Kimberlyal, Moderate Complexity Minutes (85515): 30       Signing Clinician: JUN Capps [facility]                                                                                   OUTPATIENT PHYSICAL THERAPY      PLAN OF TREATMENT FOR OUTPATIENT REHABILITATION   Patient's Last Name, First Name, EMBERClementeGLORIAClemente  Bobby Nelson YOB: 1955   Provider's Name   [facility]   Medical Record No.  [id number]     Onset Date: 04/02/24  Start of Care Date: 04/30/24     Medical Diagnosis:  Physical deconditioning , S/P ascending aortic aneurysm repair, Generalized muscle weakness      PT Treatment Diagnosis:  Physical deconditioning , S/P ascending aortic aneurysm repair, Generalized muscle weakness Plan of Treatment  Frequency/Duration: 1-2x/wk/ 10 visits over 12 weeks    Certification date from 04/30/24 to 07/23/24         See note for plan of treatment details and functional goals     Zhanna Cruz, PT                         I CERTIFY THE NEED FOR THESE SERVICES FURNISHED UNDER        THIS PLAN OF TREATMENT AND WHILE UNDER MY CARE     (Physician attestation of this document indicates review and certification of the therapy plan).              Referring Provider:  James Padilla    Initial Assessment  See Epic Evaluation- Start of Care Date: 04/30/24

## 2024-04-30 NOTE — ED NOTES
I am seeing this patient along with Chana Salcedo PA-C. I had a face to face encounter with this patient seen by the Advanced Practice Provider (ZACH).  I have seen, examined, and discussed the patient with the ZACH and agree with their assessment and plan of management. I personally saw the patient and performed a substantive portion of the visit including all aspects of the medical decision making.    HPI: 8-year-old male with a history of a recent CABG she went to cardiac rehab physical therapy today and before they even did any physical therapy he was directed to the ED due to elevated blood pressure.  No headache, neurologic changes, chest pain, or any other symptoms.    Physical Exam: Nontoxic, no acute distress      LABS  Pertinent lab results reviewed in chart.  Labs Ordered and Resulted from Time of ED Arrival to Time of ED Departure - No data to display    EKG  EKG reviewed interpreted by me shows sinus rhythm with a rate of 63, normal axis, QTc 456 with no acute ST or T wave changes since previous    RADIOLOGY  No orders to display       PROCEDURES       ED COURSE & MEDICAL DECISION MAKING    Pertinent Labs and Imagaing reviewed (see chart for details)    68 year old male with a coronary history here for evaluation of elevated blood pressure.  He is otherwise asymptomatic, went to cardiac rehab physical therapy today, and was sent to the ED before they did any physical therapy due to triage vitals.  We did do a screening EKG given his recent cardiac history, and there are no changes since previous.  Blood pressure here was initially documented as over 200, but after just reassurance and sitting for a few minutes its 143/83.  There are no signs of a hypertensive emergency and I do not think he needs additional emergency department workup.  He has an appointment scheduled with cardiology in 3 days.  He feels comfortable with discharge with reassurance at this time.    At the conclusion of the encounter I  discussed  the results of all of the tests and the disposition.   The questions were answered.  The patient or family acknowledged understanding and was agreeable with the care plan.       FINAL IMPRESSION      1. Asymptomatic hypertension            CRITICAL CARE  0 Minutes    Hollie Nguyen MD  4/30/2024 9:29 AM       Hollie Nguyen MD  04/30/24 [id number]

## 2024-04-30 NOTE — ED PROVIDER NOTES
Emergency Department Encounter   NAME: Bobby Nelson ; AGE: 68 year old male ; YOB: 1955 ; MRN: [id number] ; PCP: [facility]   ED PROVIDER: Chana Salcedo PA-C    Evaluation Date & Time:   4/30/2024  8:57 AM    CHIEF COMPLAINT:  Hypertension        Impression and Plan   FINAL IMPRESSION:    ICD-10-CM    1. Asymptomatic hypertension  I10           MDM:  Bobby Nelson is a 68 year old male with PMH of diverticular disease, colonic polyps. He is s/p ascending aortic aneurysm repair 4/2/24. Per patient he had his first session of cardiac rehab today and when his blood pressure was checked prior to starting the visit it was found to be 201/99. He was asymptomatic but recommended he go to the emergency department for evaluation. He states his blood pressure has been borderline for some time, he typically has systolic BP of 140 or lower. He is currently taking metoprolol 25 mg twice daily. Denies any eye, vision changes, chest pain, shortness of breath, abdominal pain, arm pain, nausea, or vomiting currently.    Vitals reviewed and unremarkable aside from a BP of 217/98. On exam he is resting comfortably, not toxic-appearing or diaphoretic. Differential diagnosis includes but not limited to essential HTN, stroke, aortic dissection, hypertensive emergency, ACS. He has intact and equal pulses bilaterally. Denies any arm pain. Has no focal deficits on exam. I have very low suspicion for aortic dissection.  He is completely asymptomatic on my interview today and his exam is benign. I do not suspect endorgan damage due to hypertension. His EKG found normal sinus rhythm, no evidence of arrhythmia or ischemia. His BP improved to 143/83 while resting comfortably in the emergency department. He has cardiology follow-up scheduled on Friday. I recommended he  a blood pressure cuff from the pharmacy and keep a diary of readings at home so cardiology has a better idea of what his blood pressures have been  on a day-to-day basis. We discussed concerning symptoms that warrant return to the emergency department, he is understanding and agreeable to this plan.      History:  Supplemental history from: Documented in chart  External Record(s) reviewed: Documented in chart    Work Up:  Chart documentation includes differential considered and any EKGs or imaging independently interpreted by provider, where specified.  In additional to work up documented, I considered the following work up: Documented in chart, if applicable.    External consultation:  Discussion of management with another provider: Dr. Nguyen    Complicating factors:  Care impacted by chronic illness: Heart Disease  Care affected by social determinants of health: N/A    Disposition considerations: Discharge. No recommendations on prescription strength medication(s). See documentation for any additional details.      ED COURSE:  9:01 AM I met and introduced myself to the patient. I gathered initial history and performed my physical exam. We discussed plan for initial workup.   9:01 AM I have staffed the patient with Dr. Nguyen, ED MD, who has evaluated the patient and agrees with all aspects of today's care.   10:06 AM I rechecked the patient and discussed results, discharge, follow up, and reasons to return to the ED.     At the conclusion of the encounter I discussed the results of all the tests and the disposition. The questions were answered. The patient or family acknowledged understanding and was agreeable with the care plan.          MEDICATIONS GIVEN IN THE EMERGENCY DEPARTMENT:  Medications - No data to display      NEW PRESCRIPTIONS STARTED AT TODAY'S ED VISIT:  Discharge Medication List as of 4/30/2024 10:12 AM            HPI   Patient information was obtained from: patient  Use of Intrepreter: N/A     Bobby Nelson is a 68 year old male with PMH of diverticular disease, colonic polyps. He is s/p ascending aortic aneurysm repair 4/2/24. Per patient  he had his first session of cardiac rehab today and when his blood pressure was checked prior to starting the visit it was found to be 201/99. He was asymptomatic but recommended he go to the emergency department for evaluation. He states his blood pressure has been borderline for some time, he typically has systolic BP of 140 or lower. He is currently taking metoprolol 25 mg twice daily. Denies any eye, vision changes, chest pain, shortness of breath, abdominal pain, arm pain, nausea, or vomiting currently.      Medical History     Past Medical History:   Diagnosis Date    Asbestos exposure     Ascending aortic aneurysm (H24)     HLD (hyperlipidemia)     HTN (hypertension)     Thyroid nodule        Past Surgical History:   Procedure Laterality Date    CV CORONARY ANGIOGRAM N/A 03/14/2024    Procedure: Coronary Angiogram;  Surgeon: Jose Luis Jaffe MD;  Location: [facility] CATH LAB CV    CV LEFT HEART CATH N/A 03/14/2024    Procedure: Left Heart Catheterization;  Surgeon: Jose Luis Jaffe MD;  Location: [facility] CATH LAB CV    ELBOW SURGERY Right 03/01/2024    REPLACE VALVE AORTIC N/A 4/2/2024    Procedure: ASCENDING AORTIC ANEURYSM REPLACEMENT, LEFT ATRIAL APPENDAGE EXCISION;  Surgeon: James Padilla MD;  Location: [facility] OR    TONSILLECTOMY      TRANSESOPHAGEAL ECHOCARDIOGRAM INTRAOPERATIVE N/A 4/2/2024    Procedure: ANESTHESIA TRANSESOPHAGEAL ECHOCARDIOGRAM;  Surgeon: James Padilla MD;  Location: [facility] OR       No family history on file.    Social History     Tobacco Use    Smoking status: Never    Smokeless tobacco: Never   Substance Use Topics    Alcohol use: Yes     Alcohol/week: 2.0 standard drinks of alcohol     Types: 2 Cans of beer per week    Drug use: Never       acetaminophen (TYLENOL) 325 MG tablet  aspirin (ASA) 81 MG chewable tablet  fish oil-omega-3 fatty acids 500 MG capsule  furosemide (LASIX) 20 MG tablet  metoprolol tartrate (LOPRESSOR) 25 MG  "tablet  rosuvastatin (CRESTOR) 20 MG tablet  senna-docusate (SENOKOT-S/PERICOLACE) 8.6-50 MG tablet          Physical Exam     First Vitals:  BP (!) 171/95   Pulse 63   Temp 98.1  F (36.7  C) (Oral)   Resp 25   Ht 1.753 m (5' 9\")   Wt 100.6 kg (221 lb 12.5 oz)   SpO2 96%   BMI 32.75 kg/m        PHYSICAL EXAM    General Appearance:  Alert, cooperative, no distress, appears stated age.  Sting comfortably, not diaphoretic or toxic appearing.  HENT: Normocephalic without obvious deformity, atraumatic. Mucous membranes moist   Eyes: Conjunctiva clear, Lids normal. No discharge.   Respiratory: No distress. Lungs clear to ausculation bilaterally. No wheezes, rhonchi or stridor  Cardiovascular: Regular rate and rhythm, no murmur. Normal cap refill.  Radial pulses 2+ and equal bilaterally. No peripheral edema  GI: Abdomen soft, nontender, normal bowel sounds  Musculoskeletal: Moving all extremities. No gross deformities  Integument: Warm, dry, no rashes or lesions  Neurologic: Alert and orientated x3. No focal deficits. Cranial nerves III through XII intact.  No facial asymmetry.  Sensation to light touch intact to face and all extremities.  Finger/nose/finger intact.  Negative pronator drift.  Intact straight leg raise.  5 out of 5 strength with , flexion extension at elbow joints, flexion at shoulder and hip joint against resistance.  Dorsiflexion and plantarflexion are intact.  Answering questions appropriately with normal speech.  No aphasia or dysarthria.  Following commands.   Psych: Normal mood and affect        Results     LAB:  All pertinent labs reviewed and interpreted  Labs Ordered and Resulted from Time of ED Arrival to Time of ED Departure - No data to display    RADIOLOGY:  No orders to display         ECG:  Performed at: 4/30/24    Impression: sinus rhythm    Rate: 62  Rhythm: sinus  Axis: normal  MD Interval: 196  QRS Interval: 104  QTc Interval: 450  ST Changes: none  Comparison: T wave " inversions no longer present    EKG results reviewed and interpreted by Dr. Nguyen, ED MD.       PROCEDURES:  N/A      Chana Salcedo PA-C   Emergency Medicine   [facility] EMERGENCY DEPARTMENT       Chana Salcedo PA-C  05/01/24 0956     no

## 2024-05-01 LAB
ATRIAL RATE - MUSE: 62 BPM
DIASTOLIC BLOOD PRESSURE - MUSE: 96 MMHG
INTERPRETATION ECG - MUSE: NORMAL
P AXIS - MUSE: 16 DEGREES
PR INTERVAL - MUSE: 196 MS
QRS DURATION - MUSE: 104 MS
QT - MUSE: 444 MS
QTC - MUSE: 450 MS
R AXIS - MUSE: 95 DEGREES
SYSTOLIC BLOOD PRESSURE - MUSE: 186 MMHG
T AXIS - MUSE: 45 DEGREES
VENTRICULAR RATE- MUSE: 62 BPM

## 2024-05-03 ENCOUNTER — OFFICE VISIT (OUTPATIENT)
Dept: CARDIOLOGY | Facility: CLINIC | Age: 69
End: 2024-05-03
Payer: MEDICARE

## 2024-05-03 ENCOUNTER — HOSPITAL ENCOUNTER (OUTPATIENT)
Dept: CARDIOLOGY | Facility: CLINIC | Age: 69
Discharge: HOME | End: 2024-05-03
Attending: PHYSICIAN ASSISTANT | Admitting: PHYSICIAN ASSISTANT
Payer: MEDICARE

## 2024-05-03 VITALS
SYSTOLIC BLOOD PRESSURE: 160 MMHG | DIASTOLIC BLOOD PRESSURE: 90 MMHG | RESPIRATION RATE: 16 BRPM | WEIGHT: 234 LBS | BODY MASS INDEX: 34.56 KG/M2 | HEART RATE: 73 BPM

## 2024-05-03 DIAGNOSIS — R29.818 SUSPECTED SLEEP APNEA: ICD-10-CM

## 2024-05-03 DIAGNOSIS — Z86.79 S/P ASCENDING AORTIC ANEURYSM REPAIR: ICD-10-CM

## 2024-05-03 DIAGNOSIS — I71.21 ANEURYSM OF ASCENDING AORTA WITHOUT RUPTURE (H): ICD-10-CM

## 2024-05-03 DIAGNOSIS — I10 PRIMARY HYPERTENSION: Primary | ICD-10-CM

## 2024-05-03 DIAGNOSIS — Z98.890 S/P ASCENDING AORTIC ANEURYSM REPAIR: ICD-10-CM

## 2024-05-03 PROCEDURE — 255N000002 HC RX 255 OP 636: Performed by: PHYSICIAN ASSISTANT

## 2024-05-03 PROCEDURE — G2211 COMPLEX E/M VISIT ADD ON: HCPCS | Performed by: STUDENT IN AN ORGANIZED HEALTH CARE EDUCATION/TRAINING PROGRAM

## 2024-05-03 PROCEDURE — 93306 TTE W/DOPPLER COMPLETE: CPT | Mod: 26 | Performed by: INTERNAL MEDICINE

## 2024-05-03 PROCEDURE — C8929 TTE W OR WO FOL WCON,DOPPLER: HCPCS

## 2024-05-03 PROCEDURE — 99204 OFFICE O/P NEW MOD 45 MIN: CPT | Performed by: STUDENT IN AN ORGANIZED HEALTH CARE EDUCATION/TRAINING PROGRAM

## 2024-05-03 RX ORDER — METOPROLOL TARTRATE 25 MG/1
25 TABLET, FILM COATED ORAL 2 TIMES DAILY
Qty: 180 TABLET | Refills: 3 | Status: SHIPPED | OUTPATIENT
Start: 2024-05-03

## 2024-05-03 RX ORDER — TELMISARTAN 40 MG/1
40 TABLET ORAL DAILY
Qty: 30 TABLET | Refills: 3 | Status: SHIPPED | OUTPATIENT
Start: 2024-05-03 | End: 2024-05-29

## 2024-05-03 RX ADMIN — PERFLUTREN 2 ML: 6.52 INJECTION, SUSPENSION INTRAVENOUS at 13:29

## 2024-05-03 NOTE — PROGRESS NOTES
[facility] HEART CARE   [address]  [place] [address]   [email or web address]   OFFICE: [phone]     CARDIOLOGY CLINIC NOTE     Thank you, Casa Bowles, for asking the [facility] Heart Care team to see Mr. Bobby Nelson to evaluate New Patient          History of Present Illness   Mr. Bobby Nelson is a 68 year old male with a significant past history of ascending aorta aneurysm s/p replacement 4/2/2024, HTN, who presents to establish care.  The patient notes a history of HTN in the past.  He previously took losartan and this was switched to just metoprolol in the hospital.  BP at home has been elevated.        Other than noted above, Mr. Nelson denies any chest pain/pressure/tightness, shortness of breath at rest or with exertion, light headedness/dizziness, pre-syncope, syncope, lower extremity swelling, palpitations, paroxysmal nocturnal dyspnea (PND), or orthopnea.       Medications  Allergies   Current Outpatient Medications   Medication Sig Dispense Refill    acetaminophen (TYLENOL) 325 MG tablet Take 2 tablets (650 mg) by mouth every 4 hours as needed for other (For optimal non-opioid multimodal pain management to improve pain control.) 60 tablet 0    aspirin (ASA) 81 MG chewable tablet 1 tablet (81 mg) by Oral or NG Tube route daily 90 tablet 3    fish oil-omega-3 fatty acids 500 MG capsule Take 1,000 mg by mouth daily      metoprolol tartrate (LOPRESSOR) 25 MG tablet Take 1 tablet (25 mg) by mouth 2 times daily 180 tablet 3    rosuvastatin (CRESTOR) 20 MG tablet Take 20 mg by mouth daily      telmisartan (MICARDIS) 40 MG tablet Take 1 tablet (40 mg) by mouth daily 30 tablet 3    senna-docusate (SENOKOT-S/PERICOLACE) 8.6-50 MG tablet Take 1 tablet by mouth 2 times daily as needed for constipation (Patient not taking: Reported on 5/3/2024) 30 tablet 0      No Known Allergies     Physical Examination Review of Systems   Vitals: BP (!) 190/94 (BP  Location: Right arm, Patient Position: Sitting, Cuff Size: Adult Large)   Pulse 73   Resp 16   Wt 106.1 kg (234 lb)   BMI 34.56 kg/m    BMI= Body mass index is 34.56 kg/m .  Wt Readings from Last 3 Encounters:   05/03/24 106.1 kg (234 lb)   04/30/24 100.6 kg (221 lb 12.5 oz)   04/24/24 105.2 kg (232 lb)       General: pleasant male. No acute distress.   Neck: No JVD  Lungs: clear to auscultation  COR:  regular rate and rhythm, No murmurs, rubs, or gallops  Extrem: No edema        Please refer above for cardiac ROS details.       Past History     Family History: No family history on file.     Social History:   Social History     Socioeconomic History    Marital status:      Spouse name: Not on file    Number of children: Not on file    Years of education: Not on file    Highest education level: Not on file   Occupational History    Not on file   Tobacco Use    Smoking status: Never    Smokeless tobacco: Never   Substance and Sexual Activity    Alcohol use: Yes     Alcohol/week: 2.0 standard drinks of alcohol     Types: 2 Cans of beer per week    Drug use: Never    Sexual activity: Not on file   Other Topics Concern    Not on file   Social History Narrative    Not on file     Social Determinants of Health     Financial Resource Strain: Not on file   Food Insecurity: Not on file   Transportation Needs: Not on file   Physical Activity: Not on file   Stress: Not on file   Social Connections: Not on file   Interpersonal Safety: Not on file   Housing Stability: Not on file            Lab Results    Chemistry/lipid CBC Cardiac Enzymes/BNP/TSH/INR   Lab Results   Component Value Date    CHOL 147 11/27/2023    HDL 50 11/27/2023    TRIG 135 11/27/2023    BUN 20.2 04/03/2024     04/03/2024    CO2 24 04/03/2024    Lab Results   Component Value Date    WBC 12.0 (H) 04/03/2024    HGB 11.7 (L) 04/03/2024    HCT 34.8 (L) 04/03/2024    MCV 91 04/03/2024     04/06/2024    Lab Results   Component Value Date     INR 1.32 (H) 04/02/2024          Cardiac Problems and Cardiac Diagnostics     Most Recent Cardiac testing:  ECG Personal interpretation  4/30/2024  NSR  Septal and lateral infarct    ECHO 3/12/2024  Interpretation Summary     The left ventricle is normal in size. There is moderate concentric left  ventricular hypertrophy.  Left ventricular systolic function is normal. The visual ejection fraction is  55-60%. No regional wall motion abnormalities noted.     The right ventricle is normal in size and function.  Normal left atrial size. Right atrial size is normal.  IVC diameter and respiratory changes fall into an intermediate range  suggesting an RA pressure of 8 mmHg.  There is mild aortic root dilation measuring 4.3 cm. There is moderate to  severe ascending aorta dilation measuring 5.1 cm.  There is no comparison study available.    Cardiac cath 3/14/2024:  Left Main   The vessel was visualized by selective angiography and is moderate in size. There was 0% vessel disease.      Left Anterior Descending   The vessel was visualized by selective angiography and is moderate in size. There was 0% vessel disease.      Left Circumflex   The vessel was visualized by selective angiography and is moderate in size. There was 0% vessel disease. Small vessel      Right Coronary Artery   The vessel was visualized by selective angiography and is moderate in size. There was 0% vessel disease. Large-caliber vessel               Assessment/Recommendations   Assessment:    Mr. Bobby Nelson is a 68 year old male with a significant past history of ascending aorta aneurysm s/p replacement 4/2/2024, HTN, who presents to establish care.      HTN   - Uncontrolled likely undertreated as he was on more potent BP lowering agents previously   - Start telmisartan 40mg qdaily   - Cont metoprool 25mg BID.  Could switch to carvedilol in the future if more BP lowering is needed   - Send BP log in 2 weeks   - He has LVH out of proportion to his BP  control, which historically has been ok.  Possible he has undiagnosed JACINDA and lack of nighttime dipping.  He does snore and has some PND.  Refer to sleep clinic    Ascending aorta aneurysm s/p replacement   - BB therapy, good BP control    RTC 4 months         Grabiel Rose DO [facility]  Non-invasive Cardiologist  [facility]

## 2024-05-03 NOTE — PATIENT INSTRUCTIONS
Psychiatric Progress Note      DATA: Patient  Subjective/Behavioral Description:  Patient verbalizes: Patient is doing much better today his burden of the chest is gone once he decided that he is going to look at it has his daughter's wedding and he is going to participate in that and trying to make mends and even thinking of having a family meeting him while he is here    Mental Status Evaluation:   Less angry less depressed less anxious doing better.    MEDICATIONS:  Current Facility-Administered Medications   Medication Dose Route Frequency Provider Last Rate Last Admin   • haloperidol (HALDOL) tablet 5 mg  5 mg Oral TID JOAN Brewster MD       • benztropine mesylate (COGENTIN) 1 MG/ML injection 1 mg  1 mg Intramuscular Q8H PRN  ALEXANDER Brewster MD   1 mg at 02/26/21 0618    Or   • benztropine (COGENTIN) tablet 1 mg  1 mg Oral Q8H PRN JOAN Brewster MD       • hydrOXYzine (ATARAX) tablet 50 mg  50 mg Oral Q6H PRN JOAN Brewster MD       • LORazepam (ATIVAN) injection 1 mg  1 mg Intramuscular Q6H PRN JOAN Brewster MD   1 mg at 02/26/21 0618    Or   • LORazepam (ATIVAN) tablet 1 mg  1 mg Oral Q6H PRN JOAN Brewster MD       • haloperidol (HALDOL) 5 MG/ML injection 5 mg  5 mg Intramuscular Q6H PRN JOAN Brewster MD   5 mg at 02/26/21 0618    Or   • haloperidol (HALDOL) tablet 5 mg  5 mg Oral Q6H PRN JOAN Brewster MD       • traZODone (DESYREL) tablet 50 mg  50 mg Oral Nightly PRN JOAN Brewster MD       • acetaminophen (TYLENOL) tablet 650 mg  650 mg Oral Q6H PRN JOAN Brewster MD   650 mg at 02/26/21 0052   • aluminum-magnesium hydroxide-simethicone (MAALOX) 200-200-20 MG/5ML suspension 20 mL  20 mL Oral Q8H PRN JOAN Brewster MD           LABS:    Admission on 02/23/2021   Component Date Value Ref Range Status   • Alcohol 02/23/2021 None Detected  None Detected mg/dL Final   • Sodium 02/23/2021 139  135 - 145 mmol/L Final   • Potassium 02/23/2021 3.2* 3.4 - 5.1 mmol/L Final   • Chloride 02/23/2021 108* 98 - 107 mmol/L Final   • Carbon Dioxide  It was a pleasure meeting you today    In summary:    We will start telmisartan for BP. Please send a BP log in 2 weeks.  We will get you a referral to the sleep clinic to evaluate for possible sleep apnea.    Please call my nurse Tapan Estrada at [phone] if you have any questions or issues.    We will schedule a follow up visit in  4 months      Grabiel Rose DO [facility]  Non-invasive Cardiologist  [facility]     02/23/2021 25  21 - 32 mmol/L Final   • Anion Gap 02/23/2021 9* 10 - 20 mmol/L Final   • Glucose 02/23/2021 116* 65 - 99 mg/dL Final   • BUN 02/23/2021 13  6 - 20 mg/dL Final   • Creatinine 02/23/2021 0.71  0.51 - 0.95 mg/dL Final   • Glomerular Filtration Rate 02/23/2021 >90  >90 mL/min/1.73m2 Final    eGFR results = or >90 mL/min/1.73m2 = Normal kidney function.   • BUN/ Creatinine Ratio 02/23/2021 18  7 - 25 Final   • Calcium 02/23/2021 9.2  8.4 - 10.2 mg/dL Final   • Albumin 02/23/2021 4.3  3.6 - 5.1 g/dL Final   • Bilirubin, Total 02/23/2021 0.5  0.2 - 1.0 mg/dL Final   • Bilirubin, Direct 02/23/2021 0.2  0.0 - 0.2 mg/dL Final   • Alkaline Phosphatase 02/23/2021 80  45 - 117 Units/L Final   • GPT/ALT 02/23/2021 14  <64 Units/L Final   • GOT/AST 02/23/2021 11  <=37 Units/L Final   • Protein, Total 02/23/2021 8.2  6.4 - 8.2 g/dL Final   • Amphetamines, Urine 02/23/2021 Negative  Negative Final    Cutoff = 500 ng/mL   • Barbiturates, Urine 02/23/2021 Negative  Negative Final    Cutoff = 200 ng/mL   • Benzodiazepines, Urine 02/23/2021 Negative  Negative Final    Cutoff = 200 ng/mL   • Cocaine/ Metabolite, Urine 02/23/2021 Negative  Negative Final    Cutoff = 150 ng/ml   • Opiates, Urine 02/23/2021 Negative  Negative Final    Cutoff = 300 ng/mL   • Phencyclidine, Urine 02/23/2021 Negative  Negative Final    Cutoff = 25 ng/mL   • Cannabinoids, Urine 02/23/2021 Negative  Negative Final    Cutoff = 50 ng/mL   • Prothrombin Time 02/23/2021 12.1* 9.7 - 11.8 sec Final   • INR 02/23/2021 1.2  <=5.0 Final    INR Therapeutic Range: 2.0 to 3.0 (2.5 to 3.5 recommended for recurrent thrombotic episodes and mechanical prosthetic heart valves.)   • PTT 02/23/2021 26  22 - 30 sec Final   • Acetaminophen 02/23/2021 <2* 10 - 30 mcg/mL Final   • Salicylate 02/23/2021 <2.8  <=30.0 mg/dL Final   • WBC 02/23/2021 7.3  4.2 - 11.0 K/mcL Final   • RBC 02/23/2021 4.21  4.00 - 5.20 mil/mcL Final   • HGB 02/23/2021 12.2  12.0 - 15.5 g/dL  Final   • HCT 02/23/2021 37.7  36.0 - 46.5 % Final   • MCV 02/23/2021 89.5  78.0 - 100.0 fl Final   • MCH 02/23/2021 29.0  26.0 - 34.0 pg Final   • MCHC 02/23/2021 32.4  32.0 - 36.5 g/dL Final   • RDW-CV 02/23/2021 13.7  11.0 - 15.0 % Final   • RDW-SD 02/23/2021 44.4  39.0 - 50.0 fL Final   • PLT 02/23/2021 230  140 - 450 K/mcL Final   • NRBC 02/23/2021 0  <=0 /100 WBC Final   • Neutrophil, Percent 02/23/2021 71  % Final   • Lymphocytes, Percent 02/23/2021 22  % Final   • Mono, Percent 02/23/2021 6  % Final   • Eosinophils, Percent 02/23/2021 1  % Final   • Basophils, Percent 02/23/2021 0  % Final   • Immature Granulocytes 02/23/2021 0  % Final   • Absolute Neutrophils 02/23/2021 5.2  1.8 - 7.7 K/mcL Final   • Absolute Lymphocytes 02/23/2021 1.6  1.0 - 4.8 K/mcL Final   • Absolute Monocytes 02/23/2021 0.5  0.3 - 0.9 K/mcL Final   • Absolute Eosinophils  02/23/2021 0.0  0.0 - 0.5 K/mcL Final   • Absolute Basophils 02/23/2021 0.0  0.0 - 0.3 K/mcL Final   • Absolute Immmature Granulocytes 02/23/2021 0.0  0.0 - 0.2 K/mcL Final   • HCG, URINE - POINT OF CARE 02/23/2021 Negative  Negative Final   • Rapid SARS-COV-2 by PCR 02/23/2021 Not Detected  Not Detected / Detected / Presumptive Positive / Inhibitors present Final   • Isolation Guidelines 02/23/2021    Final    Do not use this test result as the sole decision-maker for discontinuation of isolation.   Clinical evaluation should be considered for other respiratory illness requiring transmission-based isolation.    •       No fever (<99.0 F/37.2 C) for at least 24 hours without the use of fever-reducing medications    AND  •       Respiratory symptoms have improved or resolved (e.g. cough, shortness of breath)     AND  •       COVID-19 negative test    See COVID-19 Deisolation Resource Guide   • Procedural Comment 02/23/2021    Final    SARS-CoV-2 nucleic acid has not been detected indicating the absence of COVID-19.       Testing was performed using the [employer] Xpert  Xpress SARS-CoV-2 RT-PCR assay that has been given Emergency Use Authorization (EUA) by the [place] Food and Drug Administration (FDA).  These results are considered definitive and do not need to be confirmed by another method.   • Hemoglobin A1C 02/24/2021 5.2  4.5 - 5.6 % Final      Diabetic Screening  Non Diabetic:             <5.7%  Increased Risk:           5.7-6.4%  Diagnostic For Diabetes:  >6.4%    Diabetic Control  A1C%       eAG mg/dL  6.0            126  6.5            140  7.0            154  7.5            169  8.0            183  8.5            197  9.0            212  9.5            226  10.0           240   • TSH 02/24/2021 1.910  0.350 - 5.000 mcUnits/mL Final    Findings most consistent with euthyroid state, no additional testing suggested. TSH may be normal in patients with thyroid dysfunction and pituitary disease. Clinical correlation recommended.    (Reflex TSH algorithm is not recommended in hospitalized patients. A variety of drugs, as well as serious acute and chronic illnesses may alter thyroid function tests. Commonly implicated drugs include glucocorticoids, dopamine, carbamazepine, iodine, amiodarone, lithium and heparin.)   • Cholesterol 02/24/2021 135  <=199 mg/dL Final    Desirable         <200  Borderline High   200 to 239  High              >=240   • Triglycerides 02/24/2021 63  <=149 mg/dL Final    Normal            <150  Borderline High   150 to 199  High              200 to 499  Very High         >=500   • HDL 02/24/2021 42* >=50 mg/dL Final    Low              <40  Borderline Low   40 to 49  Near Optimal     50 to 59  Optimal          >=60   • LDL 02/24/2021 80  <=129 mg/dL Final    OPTIMAL           <100  NEAR OPTIMAL      100 to 129  BORDERLINE HIGH   130 to 159  HIGH              160 to 189  VERY HIGH         >=190   • Non-HDL Cholesterol 02/24/2021 93  mg/dL Final    Therapeutic Target:  CHD and risk equivalents  <130  Multiple risk factors     <160  0 to 1 risk factor         <190   • Cholesterol/ HDL Ratio 02/24/2021 3.2  <=4.4 Final   • GLUCOSE, BEDSIDE - POINT OF CARE 02/26/2021 96  70 - 99 mg/dL Final         PLAN:   A. Behavioral: present care plan .  Inpatient    C. Disposition: DC plan.  Family meeting today I asked the social service involved her daughter too    B. Medication: Continue medications       Medication change rationale:      No change    Again had little conversation with the patient about the need for him to go home and make plans

## 2024-05-03 NOTE — LETTER
5/3/2024    Casa Garrett MD  [address]  [facility] [address]    RE: Bobby Nelson       Dear Colleague,     I had the pleasure of seeing Bobby Nelson in the [facility].    [facility]   [address]  [place] [address]   [email or web address]   OFFICE: [phone]     CARDIOLOGY CLINIC NOTE     Thank you, Casa Bowles, for asking the [facility] team to see Mr. Bobby Nelson to evaluate New Patient          History of Present Illness   Mr. Bobby Nelson is a 68 year old male with a significant past history of ascending aorta aneurysm s/p replacement 4/2/2024, HTN, who presents to establish care.  The patient notes a history of HTN in the past.  He previously took losartan and this was switched to just metoprolol in the hospital.  BP at home has been elevated.        Other than noted above, Mr. Nelson denies any chest pain/pressure/tightness, shortness of breath at rest or with exertion, light headedness/dizziness, pre-syncope, syncope, lower extremity swelling, palpitations, paroxysmal nocturnal dyspnea (PND), or orthopnea.       Medications  Allergies   Current Outpatient Medications   Medication Sig Dispense Refill    acetaminophen (TYLENOL) 325 MG tablet Take 2 tablets (650 mg) by mouth every 4 hours as needed for other (For optimal non-opioid multimodal pain management to improve pain control.) 60 tablet 0    aspirin (ASA) 81 MG chewable tablet 1 tablet (81 mg) by Oral or NG Tube route daily 90 tablet 3    fish oil-omega-3 fatty acids 500 MG capsule Take 1,000 mg by mouth daily      metoprolol tartrate (LOPRESSOR) 25 MG tablet Take 1 tablet (25 mg) by mouth 2 times daily 180 tablet 3    rosuvastatin (CRESTOR) 20 MG tablet Take 20 mg by mouth daily      telmisartan (MICARDIS) 40 MG tablet Take 1 tablet (40 mg) by mouth daily 30 tablet 3    senna-docusate (SENOKOT-S/PERICOLACE) 8.6-50 MG tablet Take 1 tablet  by mouth 2 times daily as needed for constipation (Patient not taking: Reported on 5/3/2024) 30 tablet 0      No Known Allergies     Physical Examination Review of Systems   Vitals: BP (!) 190/94 (BP Location: Right arm, Patient Position: Sitting, Cuff Size: Adult Large)   Pulse 73   Resp 16   Wt 106.1 kg (234 lb)   BMI 34.56 kg/m    BMI= Body mass index is 34.56 kg/m .  Wt Readings from Last 3 Encounters:   05/03/24 106.1 kg (234 lb)   04/30/24 100.6 kg (221 lb 12.5 oz)   04/24/24 105.2 kg (232 lb)       General: pleasant male. No acute distress.   Neck: No JVD  Lungs: clear to auscultation  COR:  regular rate and rhythm, No murmurs, rubs, or gallops  Extrem: No edema        Please refer above for cardiac ROS details.       Past History     Family History: No family history on file.     Social History:   Social History     Socioeconomic History    Marital status:      Spouse name: Not on file    Number of children: Not on file    Years of education: Not on file    Highest education level: Not on file   Occupational History    Not on file   Tobacco Use    Smoking status: Never    Smokeless tobacco: Never   Substance and Sexual Activity    Alcohol use: Yes     Alcohol/week: 2.0 standard drinks of alcohol     Types: 2 Cans of beer per week    Drug use: Never    Sexual activity: Not on file   Other Topics Concern    Not on file   Social History Narrative    Not on file     Social Determinants of Health     Financial Resource Strain: Not on file   Food Insecurity: Not on file   Transportation Needs: Not on file   Physical Activity: Not on file   Stress: Not on file   Social Connections: Not on file   Interpersonal Safety: Not on file   Housing Stability: Not on file            Lab Results    Chemistry/lipid CBC Cardiac Enzymes/BNP/TSH/INR   Lab Results   Component Value Date    CHOL 147 11/27/2023    HDL 50 11/27/2023    TRIG 135 11/27/2023    BUN 20.2 04/03/2024     04/03/2024    CO2 24 04/03/2024     Lab Results   Component Value Date    WBC 12.0 (H) 04/03/2024    HGB 11.7 (L) 04/03/2024    HCT 34.8 (L) 04/03/2024    MCV 91 04/03/2024     04/06/2024    Lab Results   Component Value Date    INR 1.32 (H) 04/02/2024          Cardiac Problems and Cardiac Diagnostics     Most Recent Cardiac testing:  ECG Personal interpretation  4/30/2024  NSR  Septal and lateral infarct    ECHO 3/12/2024  Interpretation Summary     The left ventricle is normal in size. There is moderate concentric left  ventricular hypertrophy.  Left ventricular systolic function is normal. The visual ejection fraction is  55-60%. No regional wall motion abnormalities noted.     The right ventricle is normal in size and function.  Normal left atrial size. Right atrial size is normal.  IVC diameter and respiratory changes fall into an intermediate range  suggesting an RA pressure of 8 mmHg.  There is mild aortic root dilation measuring 4.3 cm. There is moderate to  severe ascending aorta dilation measuring 5.1 cm.  There is no comparison study available.    Cardiac cath 3/14/2024:  Left Main   The vessel was visualized by selective angiography and is moderate in size. There was 0% vessel disease.      Left Anterior Descending   The vessel was visualized by selective angiography and is moderate in size. There was 0% vessel disease.      Left Circumflex   The vessel was visualized by selective angiography and is moderate in size. There was 0% vessel disease. Small vessel      Right Coronary Artery   The vessel was visualized by selective angiography and is moderate in size. There was 0% vessel disease. Large-caliber vessel               Assessment/Recommendations   Assessment:    Mr. Bobby Nelson is a 68 year old male with a significant past history of ascending aorta aneurysm s/p replacement 4/2/2024, HTN, who presents to [facility] care.      HTN   - Uncontrolled likely undertreated as he was on more potent BP lowering agents previously   -  Start telmisartan 40mg qdaily   - Cont metoprool 25mg BID.  Could switch to carvedilol in the future if more BP lowering is needed   - Send BP log in 2 weeks   - He has LVH out of proportion to his BP control, which historically has been ok.  Possible he has undiagnosed JACINDA and lack of nighttime dipping.  He does snore and has some PND.  Refer to sleep clinic    Ascending aorta aneurysm s/p replacement   - BB therapy, good BP control    RTC 4 months         Grabiel Rose DO [facility]  Non-invasive Cardiologist  [facility]         Thank you for allowing me to participate in the care of your patient.      Sincerely,     Grabiel Rose DO     [facility]  cc:   Casa Garrett MD  [address]  [place] [address]

## 2024-05-09 ENCOUNTER — THERAPY VISIT (OUTPATIENT)
Dept: PHYSICAL THERAPY | Facility: REHABILITATION | Age: 69
End: 2024-05-09
Attending: SURGERY
Payer: MEDICARE

## 2024-05-09 ENCOUNTER — HOSPITAL ENCOUNTER (EMERGENCY)
Facility: HOSPITAL | Age: 69
Discharge: HOME | End: 2024-05-09
Attending: EMERGENCY MEDICINE | Admitting: EMERGENCY MEDICINE
Payer: MEDICARE

## 2024-05-09 VITALS
RESPIRATION RATE: 23 BRPM | WEIGHT: 234.13 LBS | BODY MASS INDEX: 34.57 KG/M2 | TEMPERATURE: 98.4 F | HEART RATE: 59 BPM | DIASTOLIC BLOOD PRESSURE: 97 MMHG | SYSTOLIC BLOOD PRESSURE: 161 MMHG | OXYGEN SATURATION: 94 %

## 2024-05-09 DIAGNOSIS — I1A.0 RESISTANT HYPERTENSION: ICD-10-CM

## 2024-05-09 DIAGNOSIS — R53.81 PHYSICAL DECONDITIONING: Primary | ICD-10-CM

## 2024-05-09 LAB
ANION GAP SERPL CALCULATED.3IONS-SCNC: 8 MMOL/L (ref 7–15)
BUN SERPL-MCNC: 14.9 MG/DL (ref 8–23)
CALCIUM SERPL-MCNC: 9.4 MG/DL (ref 8.8–10.2)
CHLORIDE SERPL-SCNC: 104 MMOL/L (ref 98–107)
CREAT SERPL-MCNC: 0.81 MG/DL (ref 0.67–1.17)
DEPRECATED HCO3 PLAS-SCNC: 27 MMOL/L (ref 22–29)
EGFRCR SERPLBLD CKD-EPI 2021: >90 ML/MIN/1.73M2
ERYTHROCYTE [DISTWIDTH] IN BLOOD BY AUTOMATED COUNT: 12.8 % (ref 10–15)
GLUCOSE SERPL-MCNC: 102 MG/DL (ref 70–99)
HCT VFR BLD AUTO: 39.2 % (ref 40–53)
HGB BLD-MCNC: 12.7 G/DL (ref 13.3–17.7)
MCH RBC QN AUTO: 29 PG (ref 26.5–33)
MCHC RBC AUTO-ENTMCNC: 32.4 G/DL (ref 31.5–36.5)
MCV RBC AUTO: 90 FL (ref 78–100)
PLATELET # BLD AUTO: 246 10E3/UL (ref 150–450)
POTASSIUM SERPL-SCNC: 4.2 MMOL/L (ref 3.4–5.3)
RBC # BLD AUTO: 4.38 10E6/UL (ref 4.4–5.9)
SODIUM SERPL-SCNC: 139 MMOL/L (ref 135–145)
TSH SERPL DL<=0.005 MIU/L-ACNC: 2.19 UIU/ML (ref 0.3–4.2)
WBC # BLD AUTO: 6.1 10E3/UL (ref 4–11)

## 2024-05-09 PROCEDURE — 85027 COMPLETE CBC AUTOMATED: CPT | Performed by: EMERGENCY MEDICINE

## 2024-05-09 PROCEDURE — 36415 COLL VENOUS BLD VENIPUNCTURE: CPT | Performed by: EMERGENCY MEDICINE

## 2024-05-09 PROCEDURE — 99207 PR NO BILLABLE SERVICE THIS VISIT: CPT | Performed by: PHYSICAL THERAPIST

## 2024-05-09 PROCEDURE — 82374 ASSAY BLOOD CARBON DIOXIDE: CPT | Performed by: EMERGENCY MEDICINE

## 2024-05-09 PROCEDURE — 99283 EMERGENCY DEPT VISIT LOW MDM: CPT

## 2024-05-09 PROCEDURE — 84443 ASSAY THYROID STIM HORMONE: CPT | Performed by: EMERGENCY MEDICINE

## 2024-05-09 RX ORDER — AMLODIPINE BESYLATE 10 MG/1
10 TABLET ORAL DAILY
Qty: 30 TABLET | Refills: 0 | Status: SHIPPED | OUTPATIENT
Start: 2024-05-09 | End: 2024-09-11

## 2024-05-09 ASSESSMENT — COLUMBIA-SUICIDE SEVERITY RATING SCALE - C-SSRS
1. IN THE PAST MONTH, HAVE YOU WISHED YOU WERE DEAD OR WISHED YOU COULD GO TO SLEEP AND NOT WAKE UP?: NO
2. HAVE YOU ACTUALLY HAD ANY THOUGHTS OF KILLING YOURSELF IN THE PAST MONTH?: NO
6. HAVE YOU EVER DONE ANYTHING, STARTED TO DO ANYTHING, OR PREPARED TO DO ANYTHING TO END YOUR LIFE?: NO

## 2024-05-09 ASSESSMENT — ACTIVITIES OF DAILY LIVING (ADL)
ADLS_ACUITY_SCORE: 38
ADLS_ACUITY_SCORE: 38
ADLS_ACUITY_SCORE: 36

## 2024-05-09 NOTE — ED NOTES
Discharge paperwork, follow up care and prescriptions discussed with pt. Pt verbalize understanding and has no questions. IV access discontinued. VSS. Pt is a/ox4 abc's intact and ambulatory with steady gait when leaving the ED today

## 2024-05-09 NOTE — ED PROVIDER NOTES
EMERGENCY DEPARTMENT ENCOUNTER      NAME: Bobby Nelson  AGE: 68 year old male  YOB: 1955  MRN: [id number]  EVALUATION DATE & TIME: 5/9/2024  9:51 AM    PCP: Casa Garrett    ED PROVIDER: Mario Baum M.D.      Chief Complaint   Patient presents with    Hypertension         FINAL IMPRESSION:  Asymptomatic hypertension      ED COURSE & MEDICAL DECISION MAKING:    Pertinent Labs & Imaging studies reviewed. (See chart for details)  68 year old male presents to the Emergency Department for evaluation of hypertension.  Patient presented to physical therapy after recent aortic arch repair/replacement.  Blood pressure noted to be markedly elevated.  Patient asymptomatic.  Sent here for further evaluation.  Review of records indicate patient was seen on 5/3/2024 by cardiology.  Blood pressure at that time 190 systolic.  Had telmisartan started at 40 mg twice daily and to continue metoprolol 25 twice daily.  Patient reports she has been doing this.  States blood pressures at home typically have been 140-170 systolic..  Exam here is benign other than trace lower extremity edema which he states is markedly improved.  Will obtain baseline blood work to assess renal function and potassium after starting new medications.  Will continue to monitor and discuss findings with cardiology to determine further needs/changes in medications.  Patient reports taking all of his medications as directed and having taken them around 730 this morning patient appears non toxic with stable vitals signs. Overall exam is benign.  Patient also reports he has been much more active since his surgery.  States he walks approximately 1 hour a day and rides a bike for 30 minutes a few times a week      10:00 AM I met with the patient for the initial interview and physical examination. Discussed plan for treatment and workup in the ED.    10:33 AM.  Blood pressure moderated to 180 systolic without intervention  11:13 AM.  Laboratory  evaluation unremarkable.  Blood pressure is moderated to 166 systolic.  Will discuss findings with cardiology.  12:03 PM.  Discussed with Dr. Quiroz of cardiology.  Given his recent aortic issues does recommend more aggressive blood pressure management.  Will initiate amlodipine 10 mg daily in addition to his other medications  At the conclusion of the encounter I discussed the results of all of the tests and the disposition. The questions were answered and return precautions provided. The patient or family acknowledged understanding and was agreeable with the care plan.         MEDICATIONS GIVEN IN THE EMERGENCY:  Medications - No data to display    NEW PRESCRIPTIONS STARTED AT TODAY'S ER VISIT  Current Discharge Medication List        START taking these medications    Details   amLODIPine (NORVASC) 10 MG tablet Take 1 tablet (10 mg) by mouth daily  Qty: 30 tablet, Refills: 0            =================================================================    HPI    Patient information was obtained from: Patient    Use of Intrepreter: N/A          Bobby Nelson is a 68 year old male with a pertient medical history of ascending aortic aneurysm, HTN, HLD, s/p coronary angiogram and left heart catheterization (3/14/2024) and s/p replace aortic valve (4/2/2024), who presents to the ED for evaluation of hypertension.    Patient reports he has been elevated for the past week or so, noticed while at PT. Patient was seen by cardiology on 5/3/24 and was started on a new medication for blood pressure. He endorses he has been taking it as prescribed, and has been checking his blood pressure every morning and night. Patient endorses he has swelling in his ankles, but this is improved since his surgery. No shortness of breath. He notes he has been walking about an hour per day with 30 minutes on a stationary bike.    Per chart review, patient was seen by Cardiovascular Disease on 5/3/2024 for hypertension. He had been taking  losartan previously, and was switched to metoprolol while in the hospital. Cardio started him on telmisartan 40 mg qdaily, continued on metoprolol 25 mg BID. May switch to carvedilol in the future if more blood pressure lowering is needed.     REVIEW OF SYSTEMS   Constitutional:  Denies fever, chills  Respiratory:  Denies productive cough or increased work of breathing  Cardiovascular:  Denies chest pain, palpitations. Positive for elevated blood pressure and bilateral ankle swelling.  GI:  Denies abdominal pain, nausea, vomiting, or change in bowel or bladder habits   Musculoskeletal:  Denies any new muscle/joint swelling  Skin:  Denies rash   Neurologic:  Denies focal weakness  All systems negative except as marked.     PAST MEDICAL HISTORY:  Past Medical History:   Diagnosis Date    Asbestos exposure     Ascending aortic aneurysm (H24)     HLD (hyperlipidemia)     HTN (hypertension)     Thyroid nodule        PAST SURGICAL HISTORY:  Past Surgical History:   Procedure Laterality Date    CV CORONARY ANGIOGRAM N/A 03/14/2024    Procedure: Coronary Angiogram;  Surgeon: Jose Luis Jaffe MD;  Location: [facility] CATH LAB CV    CV LEFT HEART CATH N/A 03/14/2024    Procedure: Left Heart Catheterization;  Surgeon: Jose uLis Jaffe MD;  Location: [facility] LAB CV    ELBOW SURGERY Right 03/01/2024    REPLACE VALVE AORTIC N/A 4/2/2024    Procedure: ASCENDING AORTIC ANEURYSM REPLACEMENT, LEFT ATRIAL APPENDAGE EXCISION;  Surgeon: James Padilla MD;  Location: [facility] OR    TONSILLECTOMY      TRANSESOPHAGEAL ECHOCARDIOGRAM INTRAOPERATIVE N/A 4/2/2024    Procedure: ANESTHESIA TRANSESOPHAGEAL ECHOCARDIOGRAM;  Surgeon: James Padilla MD;  Location: [facility] OR         CURRENT MEDICATIONS:    No current facility-administered medications for this encounter.    Current Outpatient Medications:     acetaminophen (TYLENOL) 325 MG tablet, Take 2 tablets (650 mg) by mouth every 4 hours as needed for  other (For optimal non-opioid multimodal pain management to improve pain control.), Disp: 60 tablet, Rfl: 0    aspirin (ASA) 81 MG chewable tablet, 1 tablet (81 mg) by Oral or NG Tube route daily, Disp: 90 tablet, Rfl: 3    fish oil-omega-3 fatty acids 500 MG capsule, Take 1,000 mg by mouth daily, Disp: , Rfl:     metoprolol tartrate (LOPRESSOR) 25 MG tablet, Take 1 tablet (25 mg) by mouth 2 times daily, Disp: 180 tablet, Rfl: 3    rosuvastatin (CRESTOR) 20 MG tablet, Take 20 mg by mouth daily, Disp: , Rfl:     senna-docusate (SENOKOT-S/PERICOLACE) 8.6-50 MG tablet, Take 1 tablet by mouth 2 times daily as needed for constipation (Patient not taking: Reported on 5/3/2024), Disp: 30 tablet, Rfl: 0    telmisartan (MICARDIS) 40 MG tablet, Take 1 tablet (40 mg) by mouth daily, Disp: 30 tablet, Rfl: 3    ALLERGIES:  No Known Allergies    FAMILY HISTORY:  No family history on file.    SOCIAL HISTORY:   Social History     Socioeconomic History    Marital status:    Tobacco Use    Smoking status: Never    Smokeless tobacco: Never   Substance and Sexual Activity    Alcohol use: Yes     Alcohol/week: 2.0 standard drinks of alcohol     Types: 2 Cans of beer per week    Drug use: Never       VITALS:  Patient Vitals for the past 24 hrs:   BP Temp Temp src Pulse Resp SpO2 Weight   05/09/24 0946 (!) 217/108 98.4  F (36.9  C) Oral 61 18 97 % 106.2 kg (234 lb 2.1 oz)        PHYSICAL EXAM    Constitutional:  Awake, alert, in no apparent distress  HENT:  Normocephalic, Atraumatic. Bilateral external ears normal. Oropharynx moist. Nose normal. Neck- Normal range of motion with no guarding, No midline cervical tenderness, Supple, No stridor.   Eyes:  PERRL, EOMI with no signs of entrapment, Conjunctiva normal, No discharge.   Respiratory:  Normal breath sounds, No respiratory distress, No wheezing.    Cardiovascular:  Normal heart rate, Normal rhythm, No appreciable rubs or gallops. Mild lower extremity edema.  GI:  Soft, No  tenderness, No distension, No palpable masses  Musculoskeletal:  Intact distal pulses, No edema. Good range of motion in all major joints. No tenderness to palpation or major deformities noted.  Integument:  Warm, Dry, No erythema, No rash.   Neurologic:  Alert & oriented, Normal motor function, Normal sensory function, No focal deficits noted.   Psychiatric:  Affect normal, Judgment normal, Mood normal.         I, Maisha Ant, am serving as a scribe to document services personally performed by Mario Baum MD, based on my observation and the provider's statements to me. I, Mario Baum MD attest that Maisha Cain is acting in a scribe capacity, has observed my performance of the services and has documented them in accordance with my direction.    Mario Baum M.D.  Emergency Medicine  [facility] EMERGENCY DEPARTMENT     Mario Baum MD  05/09/24 3963

## 2024-05-09 NOTE — ED TRIAGE NOTES
Pt from Physical therapy, had high blood pressure, was sent here. Had heart surgery in April.      Triage Assessment (Adult)       Row Name 05/09/24 0947          Respiratory WDL    Respiratory WDL WDL        Skin Circulation/Temperature WDL    Skin Circulation/Temperature WDL WDL

## 2024-05-09 NOTE — DISCHARGE INSTRUCTIONS
Continue your telmisartan and metoprolol.  Add the amlodipine for additional blood pressure management.

## 2024-05-09 NOTE — PROGRESS NOTES
Pt comes to Physical Therapy session for deconditioning s/p heart surgery with Aortic Aneurysm Repair on 4/2.    At onset of today's PT session, BP is taken as follows with pt seated and PT using large adult cuff:    L arm: 197/112, 207/114    R arm: 205/121    L arm: 188/109 before pt leaves for ER.     **Note, pt is asymptomatic throughout and in good mood.**    PT calls pt's cardiology office (Dr. Grabiel Rose) and speaks with office who connects with nurse who recommends pt goes immediately to the ER given the pt's history. Pt goes to ER on own.

## 2024-05-16 ENCOUNTER — THERAPY VISIT (OUTPATIENT)
Dept: PHYSICAL THERAPY | Facility: REHABILITATION | Age: 69
End: 2024-05-16
Attending: SURGERY
Payer: MEDICARE

## 2024-05-16 DIAGNOSIS — Z86.79 S/P ASCENDING AORTIC ANEURYSM REPAIR: ICD-10-CM

## 2024-05-16 DIAGNOSIS — R53.81 PHYSICAL DECONDITIONING: Primary | ICD-10-CM

## 2024-05-16 DIAGNOSIS — Z98.890 S/P ASCENDING AORTIC ANEURYSM REPAIR: ICD-10-CM

## 2024-05-16 DIAGNOSIS — M62.81 GENERALIZED MUSCLE WEAKNESS: ICD-10-CM

## 2024-05-16 PROCEDURE — 97110 THERAPEUTIC EXERCISES: CPT | Mod: GP | Performed by: PHYSICAL THERAPIST

## 2024-05-23 ENCOUNTER — THERAPY VISIT (OUTPATIENT)
Dept: PHYSICAL THERAPY | Facility: REHABILITATION | Age: 69
End: 2024-05-23
Payer: MEDICARE

## 2024-05-23 DIAGNOSIS — Z98.890 S/P ASCENDING AORTIC ANEURYSM REPAIR: ICD-10-CM

## 2024-05-23 DIAGNOSIS — M62.81 GENERALIZED MUSCLE WEAKNESS: ICD-10-CM

## 2024-05-23 DIAGNOSIS — R53.81 PHYSICAL DECONDITIONING: Primary | ICD-10-CM

## 2024-05-23 DIAGNOSIS — Z86.79 S/P ASCENDING AORTIC ANEURYSM REPAIR: ICD-10-CM

## 2024-05-23 PROCEDURE — 97110 THERAPEUTIC EXERCISES: CPT | Mod: GP | Performed by: PHYSICAL THERAPIST

## 2024-05-29 ENCOUNTER — MYC MEDICAL ADVICE (OUTPATIENT)
Dept: CARDIOLOGY | Facility: CLINIC | Age: 69
End: 2024-05-29
Payer: MEDICARE

## 2024-05-29 DIAGNOSIS — I10 PRIMARY HYPERTENSION: ICD-10-CM

## 2024-05-29 RX ORDER — TELMISARTAN 40 MG/1
40 TABLET ORAL DAILY
Qty: 90 TABLET | Refills: 3 | Status: SHIPPED | OUTPATIENT
Start: 2024-05-29

## 2024-05-29 NOTE — TELEPHONE ENCOUNTER
Oli Rosales,    These numbers look good.  We'll continue the telmisartan.  We can put in for a 90 day supply with 3 refills if you let us know where to send the prescription.    Dr. Rose

## 2024-06-03 NOTE — TELEPHONE ENCOUNTER
Dr Norwood, please see communication from patient. ER physician started Amlodipine 10 mg daily. Gave 30 day supply. Continue on this medication also? If so please provide order for Rx. Thank you CMM,RN

## 2024-06-04 RX ORDER — AMLODIPINE BESYLATE 10 MG/1
10 TABLET ORAL DAILY
Qty: 90 TABLET | Refills: 3 | Status: SHIPPED | OUTPATIENT
Start: 2024-06-04

## 2024-06-17 ENCOUNTER — THERAPY VISIT (OUTPATIENT)
Dept: PHYSICAL THERAPY | Facility: REHABILITATION | Age: 69
End: 2024-06-17
Payer: MEDICARE

## 2024-06-17 DIAGNOSIS — Z98.890 S/P ASCENDING AORTIC ANEURYSM REPAIR: ICD-10-CM

## 2024-06-17 DIAGNOSIS — M62.81 GENERALIZED MUSCLE WEAKNESS: ICD-10-CM

## 2024-06-17 DIAGNOSIS — Z86.79 S/P ASCENDING AORTIC ANEURYSM REPAIR: ICD-10-CM

## 2024-06-17 DIAGNOSIS — R53.81 PHYSICAL DECONDITIONING: Primary | ICD-10-CM

## 2024-06-17 PROCEDURE — 97110 THERAPEUTIC EXERCISES: CPT | Mod: GP | Performed by: PHYSICAL THERAPIST

## 2024-07-18 ENCOUNTER — TRANSFERRED RECORDS (OUTPATIENT)
Dept: HEALTH INFORMATION MANAGEMENT | Facility: CLINIC | Age: 69
End: 2024-07-18

## 2024-07-18 ENCOUNTER — THERAPY VISIT (OUTPATIENT)
Dept: PHYSICAL THERAPY | Facility: REHABILITATION | Age: 69
End: 2024-07-18
Payer: MEDICARE

## 2024-07-18 ENCOUNTER — LAB REQUISITION (OUTPATIENT)
Dept: LAB | Facility: CLINIC | Age: 69
End: 2024-07-18
Payer: MEDICARE

## 2024-07-18 DIAGNOSIS — Z98.890 S/P ASCENDING AORTIC ANEURYSM REPAIR: ICD-10-CM

## 2024-07-18 DIAGNOSIS — E78.2 MIXED HYPERLIPIDEMIA: ICD-10-CM

## 2024-07-18 DIAGNOSIS — Z86.79 S/P ASCENDING AORTIC ANEURYSM REPAIR: ICD-10-CM

## 2024-07-18 DIAGNOSIS — R53.81 PHYSICAL DECONDITIONING: Primary | ICD-10-CM

## 2024-07-18 PROCEDURE — 97110 THERAPEUTIC EXERCISES: CPT | Mod: GP | Performed by: PHYSICAL THERAPIST

## 2024-07-18 PROCEDURE — 80053 COMPREHEN METABOLIC PANEL: CPT | Mod: ORL | Performed by: FAMILY MEDICINE

## 2024-07-18 PROCEDURE — 80061 LIPID PANEL: CPT | Mod: ORL | Performed by: FAMILY MEDICINE

## 2024-07-18 PROCEDURE — G0103 PSA SCREENING: HCPCS | Mod: ORL | Performed by: FAMILY MEDICINE

## 2024-07-18 NOTE — PROGRESS NOTES
DISCHARGE  Reason for Discharge: Patient has met all goals.    Equipment Issued: Theraband and HEP    Discharge Plan: Patient to continue home program.    Referring Provider:  James Padilla       07/18/24 0500   Appointment Info   Signing clinician's name / credentials Zhanna Cruz, PT, DPT, MTC, NCS   Total/Authorized Visits 4/10   Medical Diagnosis Physical deconditioning , S/P ascending aortic aneurysm repair, Generalized muscle weakness   PT Tx Diagnosis Physical deconditioning , S/P ascending aortic aneurysm repair, Generalized muscle weakness   Precautions/Limitations Monitor vitals; Sternal precautions   Other pertinent information Aortic Aneurysm Repair on 4/2   Progress Note/Certification   Start of Care Date 04/30/24   Onset of illness/injury or Date of Surgery 04/02/24   Therapy Frequency 1-2x/wk   Predicted Duration 10 visits over 12 weeks   Certification date from 04/30/24   Certification date to 07/23/24   GOALS   PT Goals 2;3   PT Goal 1   Goal Description Pt will be independent with his HEP for ongoing symptom management in 12 weeks.   Goal Progress MET   PT Goal 2   Goal Description Pt will improve 6MWT by 150' from initial score in 12 weeks.   Goal Progress Not fomally tested but likely MET given that pt has returned to biking 10-20 miles/day   PT Goal 3   Goal Description Pt will improve functional LE strength by 2 stands from initial score in 12 weeks.   Goal Progress MET   Subjective Report   Subjective Report Pt comes to PT and has been monitoring his BP values at home with his own machine. Readings continue to be in the 110s-120s/70s- 80s. Pt continues to exercise frequently without difficulty and remains asymptomatic. Pt is mountain biking about 10-20 miles/day about 4x/wk. He is ready for PT.   Objective Measures   Objective Measures Objective Measure 1;Objective Measure 2;Objective Measure 3   Objective Measure 1   Objective Measure BP at onset of Rx session, Seated in L LE    Details 157/82. No symptoms   Objective Measure 2   Objective Measure BP after Treadmill   Details 132/100   Objective Measure 3   Objective Measure BP after standing exercises   Details 179/91 (no symptoms); 5 min seated rest break: 152/82, taken with another BP cuff, 144/80. Pt still asymptomatic.   Treatment Interventions (PT)   Interventions Therapeutic Procedure/Exercise   Therapeutic Procedure/Exercise   Therapeutic Procedures: strength, endurance, ROM, flexibility minutes (94506) 40   Ther Proc 1 Treadmill x 10 min,continuous with light B UE support throughout for aerobic tolerance and LE strengthening   Ther Proc 1 - Details 0-4:00 @ 2.2 mph, no incline; 4:00-6:00 @ 2.2mph, 3% incline; 6:00-8:00 @ 2.5mph, 3% incline; 8:00-10:00 @ 3.0mph, no incline. Denies any symptoms throughout. RPD: 0/10; RPE: 2-3/10. BP at end of activity: 154/83   Ther Proc 2 Single leg heel raises   Ther Proc 2 - Details x 20 reps with ea LE; light single UE support for balance   PTRx Ther Proc 1 Squat   PTRx Ther Proc 1 - Details Sumo Squat with pt holding single 25lb wt, x 15 reps   PTRx Ther Proc 2 Toe Raises   PTRx Ther Proc 2 - Details HEP   PTRx Ther Proc 3 Hip AROM Standing Abduction   PTRx Ther Proc 3 - Details HEP   PTRx Ther Proc 4 Standing Hip Flexion   PTRx Ther Proc 4 - Details HEP   PTRx Ther Proc 5 Sit to Stand   PTRx Ther Proc 5 - Details HEP   PTRx Ther Proc 6 Functional Lunge Forward   PTRx Ther Proc 6 - Details 4 x 15' while holding 6.6lb med ball.   PTRx Ther Proc 7 Sidestep   PTRx Ther Proc 7 - Details 2 x 15' each way with purple theraband loop. Cues to maintain a partial squat.   Ther Proc 3 Leg Press   Ther Proc 3 - Details 2 x 20 reps at 150lbs   Plan   Plan for next session 6MWT; 30 sec chair stand   Comments   Comments Pt has met 100% of his goals and his returned to a majority of his activities since his aortic aneurysm repair. The pt is appropriate for discharge from PT at this time. Pt agrees with plan.    Total Session Time   Timed Code Treatment Minutes 40   Total Treatment Time (sum of timed and untimed services) 40

## 2024-07-19 LAB
ALBUMIN SERPL BCG-MCNC: 4.3 G/DL (ref 3.5–5.2)
ALP SERPL-CCNC: 107 U/L (ref 40–150)
ALT SERPL W P-5'-P-CCNC: 28 U/L (ref 0–70)
ANION GAP SERPL CALCULATED.3IONS-SCNC: 10 MMOL/L (ref 7–15)
AST SERPL W P-5'-P-CCNC: 30 U/L (ref 0–45)
BILIRUB SERPL-MCNC: 0.9 MG/DL
BUN SERPL-MCNC: 21.6 MG/DL (ref 8–23)
CALCIUM SERPL-MCNC: 9.3 MG/DL (ref 8.8–10.4)
CHLORIDE SERPL-SCNC: 104 MMOL/L (ref 98–107)
CHOLEST SERPL-MCNC: 127 MG/DL
CREAT SERPL-MCNC: 0.89 MG/DL (ref 0.67–1.17)
EGFRCR SERPLBLD CKD-EPI 2021: >90 ML/MIN/1.73M2
FASTING STATUS PATIENT QL REPORTED: NORMAL
FASTING STATUS PATIENT QL REPORTED: NORMAL
GLUCOSE SERPL-MCNC: 90 MG/DL (ref 70–99)
HCO3 SERPL-SCNC: 25 MMOL/L (ref 22–29)
HDLC SERPL-MCNC: 45 MG/DL
LDLC SERPL CALC-MCNC: 69 MG/DL
NONHDLC SERPL-MCNC: 82 MG/DL
POTASSIUM SERPL-SCNC: 4.4 MMOL/L (ref 3.4–5.3)
PROT SERPL-MCNC: 6.9 G/DL (ref 6.4–8.3)
PSA SERPL DL<=0.01 NG/ML-MCNC: 7.61 NG/ML (ref 0–4.5)
SODIUM SERPL-SCNC: 139 MMOL/L (ref 135–145)
TRIGL SERPL-MCNC: 65 MG/DL

## 2024-07-20 ENCOUNTER — HEALTH MAINTENANCE LETTER (OUTPATIENT)
Age: 69
End: 2024-07-20

## 2024-08-07 ENCOUNTER — TRANSFERRED RECORDS (OUTPATIENT)
Dept: HEALTH INFORMATION MANAGEMENT | Facility: CLINIC | Age: 69
End: 2024-08-07

## 2024-08-07 ENCOUNTER — LAB REQUISITION (OUTPATIENT)
Dept: LAB | Facility: CLINIC | Age: 69
End: 2024-08-07
Payer: MEDICARE

## 2024-08-07 DIAGNOSIS — R97.20 ELEVATED PROSTATE SPECIFIC ANTIGEN (PSA): ICD-10-CM

## 2024-08-07 LAB
PSA FREE MFR SERPL: 6.79 %
PSA FREE SERPL-MCNC: 0.5 NG/ML
PSA SERPL DL<=0.01 NG/ML-MCNC: 7.36 NG/ML (ref 0–4.5)

## 2024-08-07 PROCEDURE — 84153 ASSAY OF PSA TOTAL: CPT | Mod: ORL | Performed by: FAMILY MEDICINE

## 2024-09-10 ENCOUNTER — TRANSFERRED RECORDS (OUTPATIENT)
Dept: HEALTH INFORMATION MANAGEMENT | Facility: CLINIC | Age: 69
End: 2024-09-10
Payer: MEDICARE

## 2024-09-11 ENCOUNTER — OFFICE VISIT (OUTPATIENT)
Dept: CARDIOLOGY | Facility: CLINIC | Age: 69
End: 2024-09-11
Attending: STUDENT IN AN ORGANIZED HEALTH CARE EDUCATION/TRAINING PROGRAM
Payer: MEDICARE

## 2024-09-11 VITALS
RESPIRATION RATE: 17 BRPM | BODY MASS INDEX: 35.1 KG/M2 | HEART RATE: 53 BPM | HEIGHT: 69 IN | SYSTOLIC BLOOD PRESSURE: 152 MMHG | DIASTOLIC BLOOD PRESSURE: 89 MMHG | WEIGHT: 237 LBS

## 2024-09-11 DIAGNOSIS — I71.21 ANEURYSM OF ASCENDING AORTA WITHOUT RUPTURE (H): ICD-10-CM

## 2024-09-11 DIAGNOSIS — Z86.79 S/P ASCENDING AORTIC ANEURYSM REPAIR: ICD-10-CM

## 2024-09-11 DIAGNOSIS — I10 PRIMARY HYPERTENSION: Primary | ICD-10-CM

## 2024-09-11 DIAGNOSIS — R29.818 SUSPECTED SLEEP APNEA: ICD-10-CM

## 2024-09-11 DIAGNOSIS — Z98.890 S/P ASCENDING AORTIC ANEURYSM REPAIR: ICD-10-CM

## 2024-09-11 PROCEDURE — G2211 COMPLEX E/M VISIT ADD ON: HCPCS | Performed by: STUDENT IN AN ORGANIZED HEALTH CARE EDUCATION/TRAINING PROGRAM

## 2024-09-11 PROCEDURE — 99214 OFFICE O/P EST MOD 30 MIN: CPT | Performed by: STUDENT IN AN ORGANIZED HEALTH CARE EDUCATION/TRAINING PROGRAM

## 2024-09-11 RX ORDER — EPINEPHRINE 0.3 MG/.3ML
0.3 INJECTION SUBCUTANEOUS PRN
COMMUNITY
Start: 2024-07-18

## 2024-09-11 RX ORDER — LOSARTAN POTASSIUM AND HYDROCHLOROTHIAZIDE 12.5; 1 MG/1; MG/1
1 TABLET ORAL
COMMUNITY
Start: 2024-06-07 | End: 2024-09-11

## 2024-09-11 NOTE — PROGRESS NOTES
"  [facility] HE[facility]   [address]  [place] [address]   [email or web address]   OFFICE: [phone]     CARDIOLOGY CLINIC NOTE     Thank you, Casa Bowles, for asking the [facility] Heart Care team to see Mr. Bobby Nelson to evaluate Follow Up        History of Present Illness   Mr. Bobby Nelson is a 69 year old male with a significant past history of ascending aorta aneurysm s/p replacement 4/2/2024, HTN, who presents for follow up.  The patient notes he has been doing well.  BP elevated today but at home 120s-130s.   He notes no particular symptoms.  He is back to riding his bike and doing well.  He intends to try to lose weight by cleaning up his diet.           Medications  Allergies   Current Outpatient Medications   Medication Sig Dispense Refill    amLODIPine (NORVASC) 10 MG tablet Take 1 tablet (10 mg) by mouth daily 90 tablet 3    aspirin (ASA) 81 MG chewable tablet 1 tablet (81 mg) by Oral or NG Tube route daily 90 tablet 3    EPINEPHrine (ANY BX GENERIC EQUIV) 0.3 MG/0.3ML injection 2-pack Inject 0.3 mg into the muscle as needed for anaphylaxis.      fish oil-omega-3 fatty acids 500 MG capsule Take 1,000 mg by mouth daily      metoprolol tartrate (LOPRESSOR) 25 MG tablet Take 1 tablet (25 mg) by mouth 2 times daily 180 tablet 3    rosuvastatin (CRESTOR) 20 MG tablet Take 20 mg by mouth daily      telmisartan (MICARDIS) 40 MG tablet Take 1 tablet (40 mg) by mouth daily 90 tablet 3      Allergies   Allergen Reactions    Bee Venom Swelling        Physical Examination Review of Systems   Vitals: BP (!) 152/89 (BP Location: Left arm, Patient Position: Sitting, Cuff Size: Adult Large)   Pulse 53   Resp 17   Ht 1.753 m (5' 9\")   Wt 107.5 kg (237 lb)   BMI 35.00 kg/m    BMI= Body mass index is 35 kg/m .  Wt Readings from Last 3 Encounters:   09/11/24 107.5 kg (237 lb)   05/09/24 106.2 kg (234 lb 2.1 oz)   05/03/24 106.1 kg (234 lb) "       General: pleasant male. No acute distress.   Neck: No JVD  Lungs: clear to auscultation  COR:  regular rate and rhythm, No murmurs, rubs, or gallops  Extrem: No edema        Please refer above for cardiac ROS details.       Past History     Family History: No family history on file.     Social History:   Social History     Socioeconomic History    Marital status:      Spouse name: Not on file    Number of children: Not on file    Years of education: Not on file    Highest education level: Not on file   Occupational History    Not on file   Tobacco Use    Smoking status: Never    Smokeless tobacco: Never   Substance and Sexual Activity    Alcohol use: Yes     Alcohol/week: 2.0 standard drinks of alcohol     Types: 2 Cans of beer per week    Drug use: Never    Sexual activity: Not on file   Other Topics Concern    Not on file   Social History Narrative    Not on file     Social Determinants of Health     Financial Resource Strain: Not on file   Food Insecurity: Not on file   Transportation Needs: Not on file   Physical Activity: Not on file   Stress: Not on file   Social Connections: Not on file   Interpersonal Safety: Not on file   Housing Stability: Not on file            Lab Results    Chemistry/lipid CBC Cardiac Enzymes/BNP/TSH/INR   Lab Results   Component Value Date    CHOL 127 07/18/2024    HDL 45 07/18/2024    TRIG 65 07/18/2024    BUN 21.6 07/18/2024     07/18/2024    CO2 25 07/18/2024    Lab Results   Component Value Date    WBC 6.1 05/09/2024    HGB 12.7 (L) 05/09/2024    HCT 39.2 (L) 05/09/2024    MCV 90 05/09/2024     05/09/2024    Lab Results   Component Value Date    TSH 2.19 05/09/2024    INR 1.32 (H) 04/02/2024          Cardiac Problems and Cardiac Diagnostics     Most Recent Cardiac testing:  ECG Personal interpretation  4/30/2024  NSR  Septal and lateral infarct    TTE 5/3/2024  Interpretation Summary     1. Normal left ventricular size and systolic performance with a  visually  estimated ejection fraction of 55-60%.  2. No significant valvular heart disease is identified on this study.  3. On selected views, the right ventricle appears mildly enlarged with mildly  reduced systolic performance (the right ventricle is poorly visualized on this  study).  4. There is mild left atrial enlargement.  5. There is mild aortic root enlargement.     When compared to the prior real-time echocardiogram dated 12 March 2024,  patient is noted to have trouble ascending aortic aneurysm repair.  _________________________________     ECHO 3/12/2024  Interpretation Summary     The left ventricle is normal in size. There is moderate concentric left  ventricular hypertrophy.  Left ventricular systolic function is normal. The visual ejection fraction is  55-60%. No regional wall motion abnormalities noted.     The right ventricle is normal in size and function.  Normal left atrial size. Right atrial size is normal.  IVC diameter and respiratory changes fall into an intermediate range  suggesting an RA pressure of 8 mmHg.  There is mild aortic root dilation measuring 4.3 cm. There is moderate to  severe ascending aorta dilation measuring 5.1 cm.  There is no comparison study available.     Cardiac cath 3/14/2024:  Left Main   The vessel was visualized by selective angiography and is moderate in size. There was 0% vessel disease.      Left Anterior Descending   The vessel was visualized by selective angiography and is moderate in size. There was 0% vessel disease.      Left Circumflex   The vessel was visualized by selective angiography and is moderate in size. There was 0% vessel disease. Small vessel      Right Coronary Artery   The vessel was visualized by selective angiography and is moderate in size. There was 0% vessel disease. Large-caliber vessel             Assessment/Recommendations   Assessment:    Mr. Bobby Nelson is a 69 year old male with a significant past history of ascending aorta  aneurysm s/p replacement 4/2/2024, HTN, who presents for follow up.    HTN   - Elevated today but well controlled at home.  Cuff has been verified in the past.     - Cont metoprolol 25mg BID.  Continue Telmisartan, amlodipine.     - He has LVH out of proportion to his BP control, which historically has been ok.  Possible he has undiagnosed JACINDA and lack of nighttime dipping.  He does snore and has some PND.  Pt to follow through on sleep clinic referral     Ascending aorta aneurysm s/p replacement   - BB therapy, good BP control     RTC 12 months    The longitudinal plan of care for the diagnosis(es)/condition(s) as documented were addressed during this visit. Due to the added complexity in care, I will continue to support Bobby in the subsequent management and with ongoing continuity of care.           Grabiel Rose DO [facility]  Non-invasive Cardiologist  [facility]

## 2024-09-11 NOTE — PATIENT INSTRUCTIONS
It was a pleasure meeting you today    In summary:    We will keep things the same.  Continue to track your blood pressures.    Please call my nurse Tapan Estrada at [phone] if you have any questions or issues.    We will schedule a follow up visit in  12 months      Grabiel Rose DO [facility]  Non-invasive Cardiologist  [facility]

## 2024-10-28 ASSESSMENT — SLEEP AND FATIGUE QUESTIONNAIRES
HOW LIKELY ARE YOU TO NOD OFF OR FALL ASLEEP WHILE SITTING QUIETLY AFTER LUNCH WITHOUT ALCOHOL: SLIGHT CHANCE OF DOZING
HOW LIKELY ARE YOU TO NOD OFF OR FALL ASLEEP WHILE LYING DOWN TO REST IN THE AFTERNOON WHEN CIRCUMSTANCES PERMIT: SLIGHT CHANCE OF DOZING
HOW LIKELY ARE YOU TO NOD OFF OR FALL ASLEEP WHILE SITTING INACTIVE IN A PUBLIC PLACE: SLIGHT CHANCE OF DOZING
HOW LIKELY ARE YOU TO NOD OFF OR FALL ASLEEP WHEN YOU ARE A PASSENGER IN A CAR FOR AN HOUR WITHOUT A BREAK: WOULD NEVER DOZE
HOW LIKELY ARE YOU TO NOD OFF OR FALL ASLEEP WHILE WATCHING TV: SLIGHT CHANCE OF DOZING
HOW LIKELY ARE YOU TO NOD OFF OR FALL ASLEEP WHILE SITTING AND READING: SLIGHT CHANCE OF DOZING
HOW LIKELY ARE YOU TO NOD OFF OR FALL ASLEEP IN A CAR, WHILE STOPPED FOR A FEW MINUTES IN TRAFFIC: WOULD NEVER DOZE
HOW LIKELY ARE YOU TO NOD OFF OR FALL ASLEEP WHILE SITTING AND TALKING TO SOMEONE: WOULD NEVER DOZE

## 2024-10-29 PROBLEM — I71.21 ANEURYSM OF ASCENDING AORTA WITHOUT RUPTURE (H): Chronic | Status: ACTIVE | Noted: 2024-03-14

## 2024-10-29 PROBLEM — I10 HTN (HYPERTENSION): Chronic | Status: ACTIVE | Noted: 2024-10-29

## 2024-10-31 ENCOUNTER — OFFICE VISIT (OUTPATIENT)
Dept: SLEEP MEDICINE | Facility: CLINIC | Age: 69
End: 2024-10-31
Attending: STUDENT IN AN ORGANIZED HEALTH CARE EDUCATION/TRAINING PROGRAM
Payer: MEDICARE

## 2024-10-31 VITALS
WEIGHT: 237 LBS | HEIGHT: 69 IN | BODY MASS INDEX: 35.1 KG/M2 | OXYGEN SATURATION: 96 % | RESPIRATION RATE: 16 BRPM | HEART RATE: 55 BPM | SYSTOLIC BLOOD PRESSURE: 180 MMHG | DIASTOLIC BLOOD PRESSURE: 83 MMHG

## 2024-10-31 DIAGNOSIS — R06.00 DYSPNEA AND RESPIRATORY ABNORMALITY: Primary | ICD-10-CM

## 2024-10-31 DIAGNOSIS — R53.83 MALAISE AND FATIGUE: ICD-10-CM

## 2024-10-31 DIAGNOSIS — R06.89 DYSPNEA AND RESPIRATORY ABNORMALITY: Primary | ICD-10-CM

## 2024-10-31 DIAGNOSIS — E66.812 CLASS 2 SEVERE OBESITY DUE TO EXCESS CALORIES WITH SERIOUS COMORBIDITY AND BODY MASS INDEX (BMI) OF 35.0 TO 35.9 IN ADULT (H): ICD-10-CM

## 2024-10-31 DIAGNOSIS — Z72.820 LACK OF ADEQUATE SLEEP: ICD-10-CM

## 2024-10-31 DIAGNOSIS — R29.818 SUSPECTED SLEEP APNEA: ICD-10-CM

## 2024-10-31 DIAGNOSIS — I10 ESSENTIAL HYPERTENSION: ICD-10-CM

## 2024-10-31 DIAGNOSIS — E66.01 CLASS 2 SEVERE OBESITY DUE TO EXCESS CALORIES WITH SERIOUS COMORBIDITY AND BODY MASS INDEX (BMI) OF 35.0 TO 35.9 IN ADULT (H): ICD-10-CM

## 2024-10-31 DIAGNOSIS — R53.81 MALAISE AND FATIGUE: ICD-10-CM

## 2024-10-31 PROCEDURE — 99204 OFFICE O/P NEW MOD 45 MIN: CPT | Performed by: PHYSICIAN ASSISTANT

## 2024-10-31 NOTE — PROGRESS NOTES
Outpatient Sleep Medicine Consultation:      Name: Bobby Nelson MRN# [id number]   Age: 69 year old YOB: 1955     Date of Consultation: October 31, 2024  Consultation is requested by: Grabiel Rose DO  [address]  [place] [address] Grabiel Rose  Primary care provider: [facility]       Reason for Sleep Consult:     Bobby Nelson is sent by Grabiel Rose for a sleep consultation regarding possible sleep apnea in the setting moderate concentric left ventricular hypertrophy and HTN.    Patient s Reason for visit  Bobby Nelson main reason for visit: (Patient-Rptd) heart docter recommendation  Patient states problem(s) started: (Patient-Rptd) not sure  Bobby Nelson's goals for this visit: (Patient-Rptd) not sure           Assessment and Plan:     Summary Sleep Diagnoses & Recommendations:   Patient has features and risk factors for possible obstructive sleep apnea including: BMI of 35, loud snoring, non-refreshing sleep, daytime fatigue/sleepiness (ESS 5), crowded oropharynx, large neck size and co-morbid HTN. The STOP-BANG score is 7/8. The pathophysiology, diagnosis and treatment of JACINDA was discussed and a handout was provided. Will proceed with Polysomnogram (using 4% desaturation/Medicare/ AASM 1B scoring rules) for high probability obstructive sleep apnea.      Recommend weight management.  We discussed the link between obesity, sleep apnea, and health outcomes. Weight loss is recommended to address long term effects of obesity and sleep apnea.       Summary Recommendations:  Orders Placed This Encounter   Procedures    Comprehensive Sleep Study     Summary Counseling:    Sleep Testing Reviewed  Obstructive Sleep Apnea Reviewed  Complications of Untreated Sleep Apnea Reviewed      Medical Decision-making:   Educational materials provided in instructions    Total time spent reviewing medical records, history and physical examination, review of previous testing and  interpretation as well as documentation on this date:50 minutes    CC: Grabielhina Rose          History of Present Illness:     Past Sleep Evaluations:    SLEEP-WAKE SCHEDULE:     Work/School Days: Patient goes to school/work: (Patient-Rptd) Yes   Usually gets into bed at (Patient-Rptd) 11pm  Takes patient about (Patient-Rptd) 10minutes to fall asleep  Has trouble falling asleep (Patient-Rptd) none nights per week  Wakes up in the middle of the night (Patient-Rptd) 1 time times.  Wakes up due to (Patient-Rptd) Use the bathroom  He has trouble falling back asleep (Patient-Rptd) none times a week.   It usually takes (Patient-Rptd) 2 minutes to get back to sleep  Patient is usually up at (Patient-Rptd) 6am  Uses alarm: (Patient-Rptd) No    Weekends/Non-work Days/All Other Days:  Usually gets into bed at (Patient-Rptd) 11pm   Takes patient about (Patient-Rptd) 10 minutes to fall asleep  Patient is usually up at (Patient-Rptd) 6am  Uses alarm: (Patient-Rptd) No    Sleep Need  Patient gets  (Patient-Rptd) 6 hrs sleep on average   Patient thinks he needs about (Patient-Rptd) ? sleep    Bobby Nelson prefers to sleep in this position(s): (Patient-Rptd) Side   Patient states they do the following activities in bed: (Patient-Rptd) Use phone, computer, or tablet    Naps  Patient takes a purposeful nap (Patient-Rptd) 5 times a week and naps are usually (Patient-Rptd) 30 minutes in duration  He feels better after a nap: (Patient-Rptd) Yes  He dozes off unintentionally (Patient-Rptd) 6 days per week  Patient has had a driving accident or near-miss due to sleepiness/drowsiness: (Patient-Rptd) No      SLEEP DISRUPTIONS:    Breathing/Snoring  Patient snores:(Patient-Rptd) Yes  Other people complain about his snoring: (Patient-Rptd) Yes  Patient has been told he stops breathing in his sleep:(Patient-Rptd) No  He has issues with the following:      Movement:  Patient gets pain, discomfort, with an urge to move:  (Patient-Rptd) No  It  happens when he is resting:  (Patient-Rptd) No  It happens more at night:  (Patient-Rptd) Yes  Patient has been told he kicks his legs at night:  (Patient-Rptd) Yes     Behaviors in Sleep:  Bobby Nelson has experienced the following behaviors while sleeping: (Patient-Rptd) Kicking or punching  He has experienced sudden muscle weakness during the day: (Patient-Rptd) No      Is there anything else you would like your sleep provider to know: (Patient-Rptd) Not really      CAFFEINE AND OTHER SUBSTANCES:    Patient consumes caffeinated beverages per day:  (Patient-Rptd) 1  Last caffeine use is usually: (Patient-Rptd) 9 am  List of any prescribed or over the counter stimulants that patient takes: (Patient-Rptd) none  List of any prescribed or over the counter sleep medication patient takes: (Patient-Rptd) None  List of previous sleep medications that patient has tried: (Patient-Rptd) none  Patient drinks alcohol to help them sleep: (Patient-Rptd) No  Patient drinks alcohol near bedtime: (Patient-Rptd) No    Family History:  Patient has a family member been diagnosed with a sleep disorder: (Patient-Rptd) No            SCALES:    EPWORTH SLEEPINESS SCALE         10/28/2024    11:02 PM    Rolla Sleepiness Scale ( VERONICA Felipe  [id number]<br>ESS - [place]/English - Final version - 21 Nov 07 - [facility].)   Sitting and reading Slight chance of dozing   Watching TV Slight chance of dozing   Sitting, inactive in a public place (e.g. a theatre or a meeting) Slight chance of dozing   As a passenger in a car for an hour without a break Would never doze   Lying down to rest in the afternoon when circumstances permit Slight chance of dozing   Sitting and talking to someone Would never doze   Sitting quietly after a lunch without alcohol Slight chance of dozing   In a car, while stopped for a few minutes in traffic Would never doze   Rolla Score (MC) 5   Rolla Score (Sleep) 5        Patient-reported         INSOMNIA  "SEVERITY INDEX (YASSINE)          10/28/2024    10:47 PM   Insomnia Severity Index (YASSINE)   Difficulty falling asleep 0    Difficulty staying asleep 1    Problems waking up too early 0    How SATISFIED/DISSATISFIED are you with your CURRENT sleep pattern? 1    How NOTICEABLE to others do you think your sleep problem is in terms of impairing the quality of your life? 1    How WORRIED/DISTRESSED are you about your current sleep problem? 0    To what extent do you consider your sleep problem to INTERFERE with your daily functioning (e.g. daytime fatigue, mood, ability to function at work/daily chores, concentration, memory, mood, etc.) CURRENTLY? 1    YASSINE Total Score 4        Patient-reported       Guidelines for Scoring/Interpretation:  Total score categories:  0-7 = No clinically significant insomnia   8-14 = Subthreshold insomnia   15-21 = Clinical insomnia (moderate severity)  22-28 = Clinical insomnia (severe)  Used via courtesy of www.FilesX.va.gov with permission from [employer] PhD., [facility]      STOP BANG         10/31/2024    11:05 AM   STOP BANG Questionnaire (  2008, the American Society of Anesthesiologists, Inc. [employer]   B/P Clinic: 180/83         GAD7         No data to display                  CAGE-AID         No data to display                CAGE-AID reprinted with permission from the [place] Medical Journal, JANE Boyle. and GREG Duenas, \"Conjoint screening questionnaires for alcohol and drug abuse\" [place] Medical Journal 94: 135-140, 1995.      PATIENT HEALTH QUESTIONNAIRE-9 (PHQ - 9)         No data to display                Developed by Bia Frias, Bc Mayes and colleagues, with an educational sumit from [employer] No permission required to reproduce, translate, display or distribute.        Allergies:    Allergies   Allergen Reactions    Bee Venom Swelling       Medications:    Current Outpatient Medications "   Medication Sig Dispense Refill    amLODIPine (NORVASC) 10 MG tablet Take 1 tablet (10 mg) by mouth daily 90 tablet 3    aspirin (ASA) 81 MG chewable tablet 1 tablet (81 mg) by Oral or NG Tube route daily 90 tablet 3    EPINEPHrine (ANY BX GENERIC EQUIV) 0.3 MG/0.3ML injection 2-pack Inject 0.3 mg into the muscle as needed for anaphylaxis.      fish oil-omega-3 fatty acids 500 MG capsule Take 1,000 mg by mouth daily      metoprolol tartrate (LOPRESSOR) 25 MG tablet Take 1 tablet (25 mg) by mouth 2 times daily 180 tablet 3    rosuvastatin (CRESTOR) 20 MG tablet Take 20 mg by mouth daily      telmisartan (MICARDIS) 40 MG tablet Take 1 tablet (40 mg) by mouth daily 90 tablet 3       Problem List:  Patient Active Problem List    Diagnosis Date Noted    HTN (hypertension) 10/29/2024     Priority: Medium    Aneurysm of ascending aorta without rupture (H) 03/14/2024     Priority: Medium     s/p replacement 4/2/2024       Status post coronary angiogram 03/14/2024     Priority: Medium    Other ill-defined heart diseases 03/14/2024     Priority: Medium        Past Medical/Surgical History:  Past Medical History:   Diagnosis Date    Asbestos exposure     Ascending aortic aneurysm (H)     HLD (hyperlipidemia)     HTN (hypertension)     Thyroid nodule      Past Surgical History:   Procedure Laterality Date    CV CORONARY ANGIOGRAM N/A 03/14/2024    Procedure: Coronary Angiogram;  Surgeon: Jose Luis Jaffe MD;  Location: [facility] CV    CV LEFT HEART CATH N/A 03/14/2024    Procedure: Left Heart Catheterization;  Surgeon: Jose Luis Jaffe MD;  Location: [facility] CV    ELBOW SURGERY Right 03/01/2024    REPLACE VALVE AORTIC N/A 4/2/2024    Procedure: ASCENDING AORTIC ANEURYSM REPLACEMENT, LEFT ATRIAL APPENDAGE EXCISION;  Surgeon: James Padilla MD;  Location: [facility] OR    TONSILLECTOMY      TRANSESOPHAGEAL ECHOCARDIOGRAM INTRAOPERATIVE N/A 4/2/2024    Procedure: ANESTHESIA TRANSESOPHAGEAL  ECHOCARDIOGRAM;  Surgeon: James Padilla MD;  Location: [facility] OR       Social History:  Social History     Socioeconomic History    Marital status:      Spouse name: Not on file    Number of children: Not on file    Years of education: Not on file    Highest education level: Not on file   Occupational History    Not on file   Tobacco Use    Smoking status: Never    Smokeless tobacco: Never   Substance and Sexual Activity    Alcohol use: Yes     Alcohol/week: 2.0 standard drinks of alcohol     Types: 2 Cans of beer per week    Drug use: Never    Sexual activity: Not on file   Other Topics Concern    Not on file   Social History Narrative    Not on file     Social Drivers of Health     Financial Resource Strain: Not on file   Food Insecurity: Not on file   Transportation Needs: Not on file   Physical Activity: Not on file   Stress: Not on file   Social Connections: Not on file   Interpersonal Safety: Not on file   Housing Stability: Not on file       Family History:  No family history on file.    Review of Systems:  A complete review of systems reviewed by me is negative with the exeption of what has been mentioned in the history of present illness.  In the last TWO WEEKS have you experienced any of the following symptoms?  Fevers: (Patient-Rptd) No  Night Sweats: (Patient-Rptd) No  Weight Gain: (Patient-Rptd) No  Pain at Night: (Patient-Rptd) No  Double Vision: (Patient-Rptd) No  Changes in Vision: (Patient-Rptd) No  Difficulty Breathing through Nose: (Patient-Rptd) No  Sore Throat in Morning: (Patient-Rptd) No  Dry Mouth in the Morning: (Patient-Rptd) No  Shortness of Breath Lying Flat: (Patient-Rptd) No  Shortness of Breath With Activity: (Patient-Rptd) No  Awakening with Shortness of Breath: (Patient-Rptd) No  Increased Cough: (Patient-Rptd) No  Heart Racing at Night: (Patient-Rptd) No  Swelling in Feet or Legs: (Patient-Rptd) No  Diarrhea at Night: (Patient-Rptd) No  Heartburn at Night:  "(Patient-Rptd) No  Urinating More than Once at Night: (Patient-Rptd) No  Losing Control of Urine at Night: (Patient-Rptd) No  Joint Pains at Night: (Patient-Rptd) No  Headaches in Morning: (Patient-Rptd) No  Weakness in Arms or Legs: (Patient-Rptd) No  Depressed Mood: (Patient-Rptd) No  Anxiety: (Patient-Rptd) No     Physical Examination:  Vitals: BP (!) 180/83   Pulse 55   Resp 16   Ht 1.753 m (5' 9\")   Wt 107.5 kg (237 lb)   SpO2 96%   BMI 35.00 kg/m    BMI= Body mass index is 35 kg/m .    Neck Cir (cm): 45 cm      GENERAL APPEARANCE: alert and no distress  EYES: Eyes grossly normal to inspection, PERRL, and conjunctivae and sclerae normal  HENT: oropharynx crowded  NECK: no asymmetry, masses, or scars and thyroid normal to palpation  RESP: lungs clear to auscultation - no rales, rhonchi or wheezes  CV: regular rates and rhythm, normal S1 S2, no S3 or S4, and no murmur, click or rub  MS: extremities normal- no gross deformities noted  NEURO: Normal strength and tone, mentation intact, and speech normal  PSYCH: affect normal/bright  Mallampati Class: IV.  Tonsillar Stage:          Data: All pertinent previous laboratory data reviewed     Recent Labs   Lab Test 07/18/24  1518 05/09/24  1023    139   POTASSIUM 4.4 4.2   CHLORIDE 104 104   CO2 25 27   ANIONGAP 10 8   GLC 90 102*   BUN 21.6 14.9   CR 0.89 0.81   DERIAN 9.3 9.4       Recent Labs   Lab Test 05/09/24  1023   WBC 6.1   RBC 4.38*   HGB 12.7*   HCT 39.2*   MCV 90   MCH 29.0   MCHC 32.4   RDW 12.8          Recent Labs   Lab Test 07/18/24  1518   PROTTOTAL 6.9   ALBUMIN 4.3   BILITOTAL 0.9   ALKPHOS 107   AST 30   ALT 28       TSH (uIU/mL)   Date Value   05/09/2024 2.19         pH Arterial (no units)   Date Value   04/03/2024 7.36   04/02/2024 7.34 (L)     pO2 Arterial (mm Hg)   Date Value   04/03/2024 79 (L)   04/02/2024 77 (L)     pCO2 Arterial (mm Hg)   Date Value   04/03/2024 45   04/02/2024 43     Bicarbonate Arterial (mmol/L)   Date Value "   04/03/2024 26   04/02/2024 23     Base Excess/Deficit Arterial (mmol/L)   Date Value   04/03/2024 0.2   04/02/2024 -2.5         Chest CT:   CTA Chest Abdomen Pelvis w Contrast 03/12/2024    Narrative  EXAM: CTA CHEST ABDOMEN PELVIS W CONTRAST  LOCATION: [facility]  DATE: 3/12/2024    INDICATION:  Aneurysm of ascending aorta without rupture. Follow-up.  COMPARISON: Noncontrast chest CT on 01/05/2024  TECHNIQUE: CT angiogram chest abdomen pelvis during arterial phase of injection of IV contrast. 2D and 3D MIP reconstructions were performed by the CT technologist. Dose reduction techniques were used.  CONTRAST: 72 mL IV ISOVUE 370    FINDINGS:  CT ANGIOGRAM CHEST, ABDOMEN, AND PELVIS: Aneurysmal dilatation of the ascending thoracic aorta again seen measuring 5.1 x 5.1 cm just below the level of the main pulmonary artery, stable. Descending thoracic aorta and abdominal aorta are normal in  caliber without aneurysmal dilatation. No dissection. Celiac artery, SMA, single left/2 right renal arteries and SALVATORE are patent. Mild ectasia of the left common iliac artery measuring 0.8 cm. Bilateral iliofemoral arteries are also patent without  dissection.    LUNGS AND PLEURA: 2 to 4 mm subpleural nodules again seen in the lateral right upper lobe (series 4 image 82), left lower lobe (image 101), left upper lobe (image 91) and right lower lobe (images 134 and 140), unchanged. No confluent consolidation,  pleural effusion or pneumothorax.    MEDIASTINUM/AXILLAE: No lymphadenopathy. Heart size is normal without pericardial effusion. A large, heterogeneous lesion again seen in the right thyroid measuring 4 x 3.5 cm, grossly unchanged.    CORONARY ARTERY CALCIFICATION: None.    HEPATOBILIARY: Normal liver and gallbladder.    PANCREAS: Normal.    SPLEEN: Normal.    ADRENAL GLANDS: Normal.    KIDNEYS/BLADDER: Normal kidneys without stones or hydronephrosis. A 1.5 cm cyst in the left kidney does not require  dedicated imaging follow-up. A small diverticulum arises from the right bladder wall.    BOWEL: Multiple diverticula in the sigmoid colon without acute diverticulitis. No focal bowel thickening or obstruction. Appendix is normal.    LYMPH NODES: Normal.    PELVIC ORGANS: Normal.    MUSCULOSKELETAL: Tiny fat-containing umbilical hernia. Mild to moderate compressive height loss from T4 to T7 as well as L3, likely degenerative. Mild degenerative anterolisthesis of L4 and L5. Multilevel mild degenerative disc space narrowing in the  thoracolumbar spine. Healed chronic fractures seen in the sternum and manubrium. No acute fracture or suspicious osseous lesions.    Impression  IMPRESSION:    1.  Stable 5.1 cm ascending thoracic aortic aneurysm without dissection.    2.  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    3.  Multiple scattered subpleural nodules measuring 1 to 4 mm in the bilateral lung, most likely subpleural lymph nodes. These may be monitored on subsequent surveillance study for ascending thoracic aortic aneurysm.    4.  4 cm heterogeneous nodule in the right thyroid. Recommend further evaluation by thyroid sonogram.    5.  Colonic diverticulosis without acute diverticulitis.        TAYLOR Hampton 10/31/2024

## 2024-10-31 NOTE — NURSING NOTE
"Chief Complaint   Patient presents with    Sleep Problem     Referred by Cardiologist.     Snoring       Initial BP (!) 180/83   Pulse 55   Resp 16   Ht 1.753 m (5' 9\")   Wt 107.5 kg (237 lb)   SpO2 96%   BMI 35.00 kg/m   Estimated body mass index is 35 kg/m  as calculated from the following:    Height as of this encounter: 1.753 m (5' 9\").    Weight as of this encounter: 107.5 kg (237 lb).    Medication Reconciliation: complete    Neck circumference: 17.75 inches / 45 centimeters.    Raudel Spencer CMA on 10/31/2024 at 11:05 AM       "

## 2024-10-31 NOTE — NURSING NOTE
PSG and follow-up appointment with provider have both been scheduled. PSG hand-out given to and reviewed with patient at clinic visit.  Rin Mao, CMA

## 2024-10-31 NOTE — PATIENT INSTRUCTIONS
"          MY TREATMENT INFORMATION FOR SLEEP APNEA-  Bobby Nelson    DOCTOR : TAYLOR Hampton    Am I having a sleep study at a sleep center?  --->Due to normal delays, you will be contacted within 2-4 weeks to schedule    Am I having a home sleep study?  --->Watch the video for the device you are using:    -/drop off device-   https://www.FrenchWebube.com/watch?v=yGGFBdELGhk    -Disposable device sent out require phone/computer application-   https://www.Coghead.com/watch?v=BCce_vbiwxE      Frequently asked questions:  1. What is Obstructive Sleep Apnea (JACINDA)? JACINDA is the most common type of sleep apnea. Apnea means, \"without breath.\"  Apnea is most often caused by narrowing or collapse of the upper airway as muscles relax during sleep.   Almost everyone has occasional apneas. Most people with sleep apnea have had brief interruptions at night frequently for many years.  The severity of sleep apnea is related to how frequent and severe the events are.   2. What are the consequences of JACINDA? Symptoms include: feeling sleepy during the day, snoring loudly, gasping or stopping of breathing, trouble sleeping, and occasionally morning headaches or heartburn at night.  Sleepiness can be serious and even increase the risk of falling asleep while driving. Other health consequences may include development of high blood pressure and other cardiovascular disease in persons who are susceptible. Untreated JACINDA  can contribute to heart disease, stroke and diabetes.   3. What are the treatment options? In most situations, sleep apnea is a lifelong disease that must be managed with daily therapy. Medications are not effective for sleep apnea and surgery is generally not considered until other therapies have been tried. Your treatment is your choice . Continuous Positive Airway (CPAP) works right away and is the therapy that is effective in nearly everyone. An oral device to hold your jaw forward is usually the next most " reliable option. Other options include postioning devices (to keep you off your back), weight loss, and surgery including a tongue pacing device. There is more detail about some of these options below.  4. Are my sleep studies covered by insurance? Although we will request verification of coverage, we advise you also check in advance of the study to ensure there is coverage.    Important tips for those choosing CPAP and similar devices  REMEMBER-IF YOU RECEIVE A CALL FROM  [phone]-->IT IS TO SETUP A DEVICE  For new devices, sign up for device VALERIE to monitor your device for your followup visits  We encourage you to utilize the [employer] valerie or website ( [email or web address] ) to monitor your therapy progress and share the data with your healthcare team when you discuss your sleep apnea.                                                    Know your equipment:  CPAP is continuous positive airway pressure that prevents obstructive sleep apnea by keeping the throat from collapsing while you are sleeping. In most cases, the device is  smart  and can slowly self-adjusts if your throat collapses and keeps a record every day of how well you are treated-this information is available to you and your care team.  BPAP is bilevel positive airway pressure that keeps your throat open and also assists each breath with a pressure boost to maintain adequate breathing.  Special kinds of BPAP are used in patients who have inadequate breathing from lung or heart disease. In most cases, the device is  smart  and can slowly self-adjusts to assist breathing. Like CPAP, the device keeps a record of how well you are treated.  Your mask is your connection to the device. You get to choose what feels most comfortable and the staff will help to make sure if fits. Here: are some examples of the different masks that are available: Magnetic mask aids may assist with use but there are safety issues that should be addressed when  considering with magnets* ( see end of discussion).       Key points to remember on your journey with sleep apnea:  Sleep study.  PAP devices often need to be adjusted during a sleep study to show that they are effective and adjusted right.  Good tips to remember: Try wearing just the mask during a quiet time during the day so your body adapts to wearing it. A humidifier is recommended for comfort in most cases to prevent drying of your nose and throat. Allergy medication from your provider may help you if you are having nasal congestion.  Getting settled-in. It takes more than one night for most of us to get used to wearing a mask. Try wearing just the mask during a quiet time during the day so your body adapts to wearing it. A humidifier is recommended for comfort in most cases. Our team will work with you carefully on the first day and will be in contact within 4 days and again at 2 and 4 weeks for advice and remote device adjustments. Your therapy is evaluated by the device each day.   Use it every night. The more you are able to sleep naturally for 7-8 hours, the more likely you will have good sleep and to prevent health risks or symptoms from sleep apnea. Even if you use it 4 hours it helps. Occasionally all of us are unable to use a medical therapy, in sleep apnea, it is not dangerous to miss one night.   Communicate. Call our skilled team on the number provided on the first day if your visit for problems that make it difficult to wear the device. Over 2 out of 3 patients can learn to wear the device long-term with help from our team. Remember to call our team or your sleep providers if you are unable to wear the device as we may have other solutions for those who cannot adapt to mask CPAP therapy. It is recommended that you sleep your sleep provider within the first 3 months and yearly after that if you are not having problems.   Use it for your health. We encourage use of CPAP masks during daytime quiet  periods to allow your face and brain to adapt to the sensation of CPAP so that it will be a more natural sensation to awaken to at night or during naps. This can be very useful during the first few weeks or months of adapting to CPAP though it does not help medically to wear CPAP during wakefulness and  should not be used as a strategy just to meet guidelines.  Take care of your equipment. Make sure you clean your mask and tubing using directions every day and that your filter and mask are replaced as recommended or if they are not working.     *Masks with magnets:  Updated Contraindications  Masks with magnetic components are contraindicated for use by patients where they, or anyone in close physical contact while using the mask, have the following:   Active medical implants that interact with magnets (i.e., pacemakers, implantable cardioverter defibrillators (ICD), neurostimulators, cerebrospinal fluid (CSF) shunts, insulin/infusion pumps)   Metallic implants/objects containing ferromagnetic material (i.e., aneurysm clips/flow disruption devices, embolic coils, stents, valves, electrodes, implants to restore hearing or balance with implanted magnets, ocular implants, metallic splinters in the eye)  Updated Warning  Keep the mask magnets at a safe distance of at least 6 inches (150 mm) away from implants or medical devices that may be adversely affected by magnetic interference. This warning applies to you or anyone in close physical contact with your mask. The magnets are in the frame and lower headgear clips, with a magnetic field strength of up to 400mT. When worn, they connect to secure the mask but may inadvertently detach while asleep.  Implants/medical devices, including those listed within contraindications, may be adversely affected if they change function under external magnetic fields or contain ferromagnetic materials that attract/repel to magnetic fields (some metallic implants, e.g., contact lenses  with metal, dental implants, metallic cranial plates, screws, gilda hole covers, and bone substitute devices). Consult your physician and  of your implant / other medical device for information on the potential adverse effects of magnetic fields.    BESIDES CPAP, WHAT OTHER THERAPIES ARE THERE?    Positioning Device  Positioning devices are generally used when sleep apnea is mild and only occurs on your back.This example shows a pillow that straps around the waist. It may be appropriate for those whose sleep study shows milder sleep apnea that occurs primarily when lying flat on one's back. Preliminary studies have shown benefit but effectiveness at home may need to be verified by a home sleep test. These devices are generally not covered by medical insurance.  Examples of devices that maintain sleeping on the back to prevent snoring and mild sleep apnea.    Belt type body positioner  http://[employer]/    Electronic reminder  http://nightshifttherapy.Scarosso/            Oral Appliance  What is oral appliance therapy?  An oral appliance device fits on your teeth at night like a retainer used after having braces. The device is made by a specialized dentist and requires several visits over 1-2 months before a manufactured device is made to fit your teeth and is adjusted to prevent your sleep apnea. Once an oral device is working properly, snoring should be improved. A home sleep test may be recommended at that time if to determine whether the sleep apnea is adequately treated.       Some things to remember:  -Oral devices are often, but not always, covered by your medical insurance. Be sure to check with your insurance provider.   -If you are referred for oral therapy, you will be given a list of specialized dentists to consider or you may choose to visit the Web site of the American Academy of Dental Sleep Medicine  -Oral devices are less likely to work if you have severe sleep apnea or are extremely  overweight.     More detailed information  An oral appliance is a small acrylic device that fits over the upper and lower teeth  (similar to a retainer or a mouth guard). This device slightly moves jaw forward, which moves the base of the tongue forward, opens the airway, improves breathing for effective treat snoring and obstructive sleep apnea in perhaps 7 out of 10 people .  The best working devices are custom-made by a dental device  after a mold is made of the teeth 1, 2, 3.  When is an oral appliance indicated?  Oral appliance therapy is recommended as a first-line treatment for patients with primary snoring, mild sleep apnea, and for patients with moderate sleep apnea who prefer appliance therapy to use of CPAP4, 5. Severity of sleep apnea is determined by sleep testing and is based on the number of respiratory events per hour of sleep.   How successful is oral appliance therapy?  The success rate of oral appliance therapy in patients with mild sleep apnea is 75-80% while in patients with moderate sleep apnea it is 50-70%. The chance of success in patients with severe sleep apnea is 40-50%. The research also shows that oral appliances have a beneficial effect on the cardiovascular health of JACINDA patients at the same magnitude as CPAP therapy7.  Oral appliances should be a second-line treatment in cases of severe sleep apnea, but if not completely successful then a combination therapy utilizing CPAP plus oral appliance therapy may be effective. Oral appliances tend to be effective in a broad range of patients although studies show that the patients who have the highest success are females, younger patients, those with milder disease, and less severe obesity. 3, 6.   Finding a dentist that practices dental sleep medicine  Specific training is available through the American Academy of Dental Sleep Medicine for dentists interested in working in the field of sleep. To find a dentist who is educated in  the field of sleep and the use of oral appliances, near you, visit the Web site of the American Academy of Dental Sleep Medicine.    References  1. Radha, et al. Objectively measured vs self-reported compliance during oral appliance therapy for sleep-disordered breathing. Chest 2013; 144(5): 2367-7426.  2. Ben et al. Objective measurement of compliance during oral appliance therapy for sleep-disordered breathing. Thorax 2013; 68(1): 91-96.  3. Ce et al. Mandibular advancement devices in 620 men and women with JACINDA and snoring: tolerability and predictors of treatment success. Chest 2004; 125: 5963-3053.  4. Kim, et al. Oral appliances for snoring and JACINDA: a review. Sleep 2006; 29: 244-262.  5. Dioni et al. Oral appliance treatment for JACINDA: an update. J Clin Sleep Med 2014; 10(2): 215-227.  6. Erick et al. Predictors of OSAH treatment outcome. J Dent Res 2007; 86: 8612-7487.      Weight Loss:   Your Body mass index is 35 kg/m .    Being overweight does not necessarily mean you will have health consequences.  Those who have BMI over 35 or over 27 with existing medical conditions carries greater risk.   Weight loss decreases severity of sleep apnea in most people with obesity. For those with mild obesity who have developed snoring with weight gain, even 15-30 pound weight loss can improve and occasionally milder eliminate sleep apnea.  Structured and life-long dietary and health habits are necessary to lose weight and keep healthier weight levels.     The Comprehensive Weight loss program offers all aspects of weight loss strategies including two Non-Surgical Weight Loss Programs: Medical Weight Management and our 24 Week Healthy Lifestyle Program:    Medical Weight Management: You will meet with a Medical Weight Management Provider, as well as a Registered Dietician. The program may include medication therapy, dietary education, recommended exercise and physical therapy programs,  monthly support group meetings, and possible psychological counseling. Follow up visits with the provider or dietician are scheduled based on your progress and needs.    24 Week Healthy Lifestyle Program: This unique program is designed to give you the support of weekly appointments and activities thru a 24-week period. It may include all of the components of the basic program (above), with the addition of 11 individual Health  Visits, 24-week access to the [employer] website for over 700 online classes, and monthly support group meetings. This program has an out-of-pocket expense of $499 to cover the items that can not be billed to insurance (health coaches and [employer] access), and is non-refundable/non-transferable (you may be able to use a Health Savings Account; ask your HSA provider). There may be an optional meal replacement plan prescribed as well.   Surgical management achieves meaningful long-term weight loss and improvement in health risks in most patients with more severe obesity.      Sleep Apnea Surgery:    Surgery for obstructive sleep apnea is considered generally only when other therapies fail to work. Surgery may be discussed with you if you are having a difficult time tolerating CPAP and or when there is an abnormal structure that requires surgical correction.  Nose and throat surgeries often enlarge the airway to prevent collapse.  Most of these surgeries create pain for 1-2 weeks and up to half of the most common surgeries are not effective throughout life.  You should carefully discuss the benefits and drawbacks to surgery with your sleep provider and surgeon to determine if it is the best solution for you.   More information  Surgery for JACINDA is directed at areas that are responsible for narrowing or complete obstruction of the airway during sleep.  There are a wide range of procedures available to enlarge and/or stabilize the airway to prevent blockage of breathing in the three major  areas where it can occur: the palate, tongue, and nasal regions.  Successful surgical treatment depends on the accurate identification of the factors responsible for obstructive sleep apnea in each person.  A personalized approach is required because there is no single treatment that works well for everyone.  Because of anatomic variation, consultation with an examination by a sleep surgeon is a critical first step in determining what surgical options are best for each patient.  In some cases, examination during sedation may be recommended in order to guide the selection of procedures.  Patients will be counseled about risks and benefits as well as the typical recovery course after surgery. Surgery is typically not a cure for a person s JACINDA.  However, surgery will often significantly improve one s JACINDA severity (termed  success rate ).  Even in the absence of a cure, surgery will decrease the cardiovascular risk associated with OSA7; improve overall quality of life8 (sleepiness, functionality, sleep quality, etc).      Palate Procedures:  Patients with JACINDA often have narrowing of their airway in the region of their tonsils and uvula.  The goals of palate procedures are to widen the airway in this region as well as to help the tissues resist collapse.  Modern palate procedure techniques focus on tissue conservation and soft tissue rearrangement, rather than tissue removal.  Often the uvula is preserved in this procedure. Residual sleep apnea is common in patient after pharyngoplasty with an average reduction in sleep apnea events of 33%2.      Tongue Procedures:  ExamWhile patients are awake, the muscles that surround the throat are active and keep this region open for breathing. These muscles relax during sleep, allowing the tongue and other structures to collapse and block breathing.  There are several different tongue procedures available.  Selection of a tongue base procedure depends on characteristics seen on  physical exam.  Generally, procedures are aimed at removing bulky tissues in this area or preventing the back of the tongue from falling back during sleep.  Success rates for tongue surgery range from 50-62%3.    Hypoglossal Nerve Stimulation:  Hypoglossal nerve stimulation has recently received approval from the [place] Food and Drug Administration for the treatment of obstructive sleep apnea.  This is based on research showing that the system was safe and effective in treating sleep apnea6.  Results showed that the median AHI score decreased 68%, from 29.3 to 9.0. This therapy uses an implant system that senses breathing patterns and delivers mild stimulation to airway muscles, which keeps the airway open during sleep.  The system consists of three fully implanted components: a small generator (similar in size to a pacemaker), a breathing sensor, and a stimulation lead.  Using a small handheld remote, a patient turns the therapy on before bed and off upon awakening.    Candidates for this device must be greater than 18 years of age, have moderate to severe obstructive sleep apnea with less than 25% central events  (AHI between 15-65), BMI less than 35, have tried CPAP/oral appliance for at least 8 weeks without success, and have appropriate upper airway anatomy (determined by a sleep endoscopy performed by Dr. Hunter Dickerson or Dr. Abdulaziz Kingston).    Nasal Procedures:  Nasal obstruction can interfere with nasal breathing during the day and night.  Studies have shown that relief of nasal obstruction can improve the ability of some patients to tolerate positive airway pressure therapy for obstructive sleep apnea1.  Treatment options include medications such as nasal saline, topical corticosteroid and antihistamine sprays, and oral medications such as antihistamines or decongestants. Non-surgical treatments can include external nasal dilators for selected patients. If these are not successful by themselves,  surgery can improve the nasal airway either alone or in combination with these other options.        Combination Procedures:  Combination of surgical procedures and other treatments may be recommended, particularly if patients have more than one area of narrowing or persistent positional disease.  The success rate of combination surgery ranges from 66-80%2,3.    References  Arin SYKES. The Role of the Nose in Snoring and Obstructive Sleep Apnoea: An Update.  Eur Arch Otorhinolaryngol. 2011; 268: 1365-73.   Victor Manuel SM; Lisa JA; Rowena JR; Pallanch JF; León MB; Jack SG; Aileen CERON. Surgical modifications of the upper airway for obstructive sleep apnea in adults: a systematic review and meta-analysis. SLEEP 2010;33(10):7223-7592. Sandy JENSEN. Hypopharyngeal surgery in obstructive sleep apnea: an evidence-based medicine review.  Arch Otolaryngol Head Neck Surg. 2006 Feb;132(2):206-13.  Pola YH1, Pro Y, Gilmer LULU. The efficacy of anatomically based multilevel surgery for obstructive sleep apnea. Otolaryngol Head Neck Surg. 2003 Oct;129(4):327-35.  Sandy JENSEN, Goldberg A. Hypopharyngeal Surgery in Obstructive Sleep Apnea: An Evidence-Based Medicine Review. Arch Otolaryngol Head Neck Surg. 2006 Feb;132(2):206-13.  West RAMACHANDRAN et al. Upper-Airway Stimulation for Obstructive Sleep Apnea.  N Engl J Med. 2014 Jan 9;370(2):139-49.  Mely Y et al. Increased Incidence of Cardiovascular Disease in Middle-aged Men with Obstructive Sleep Apnea. Am J Respir Crit Care Med; 2002 166: 159-165  Morales EM et al. Studying Life Effects and Effectiveness of Palatopharyngoplasty (SLEEP) study: Subjective Outcomes of Isolated Uvulopalatopharyngoplasty. Otolaryngol Head Neck Surg. 2011; 144: 623-631.        WHAT IF I ONLY HAVE SNORING?    Mandibular advancement devices, lateral sleep positioning, long-term weight loss and treatment of nasal allergies have been shown to improve snoring.  Exercising tongue muscles with a game  (https://Tagasauris.Capptain.evOLED/us/valerie/soundly-reduce-snoring/pj5364498492) or stimulating the tongue during the day with a device (https://doi.org/10.3390/doe52521745) have improved snoring in some individuals.  https://www.GSOUND.evOLED/  https://www.sleepfoundation.org/best-anti-snoring-mouthpieces-and-mouthguards    Remember to Drive Safe... Drive Alive     Sleep health profoundly affects your health, mood, and your safety.  Thirty three percent of the population (one in three of us) is not getting enough sleep and many have a sleep disorder. Not getting enough sleep or having an untreated / undertreated sleep condition may make us sleepy without even knowing it. In fact, our driving could be dramatically impaired due to our sleep health. As your provider, here are some things I would like you to know about driving:     Here are some warning signs for impairment and dangerous drowsy driving:              -Having been awake more than 16 hours               -Looking tired               -Eyelid drooping              -Head nodding (it could be too late at this point)              -Driving for more than 30 minutes     Some things you could do to make the driving safer if you are experiencing some drowsiness:              -Stop driving and rest              -Call for transportation              -Make sure your sleep disorder is adequately treated     Some things that have been shown NOT to work when experiencing drowsiness while driving:              -Turning on the radio              -Opening windows              -Eating any  distracting  /  entertaining  foods (e.g., sunflower seeds, candy, or any other)              -Talking on the phone      Your decision may not only impact your life, but also the life of others. Please, remember to drive safe for yourself and all of us.         Bobby to follow up with Primary Care provider regarding elevated blood pressure.

## 2025-02-04 ENCOUNTER — TELEPHONE (OUTPATIENT)
Dept: SLEEP MEDICINE | Facility: CLINIC | Age: 70
End: 2025-02-04
Payer: MEDICARE

## 2025-07-13 PROBLEM — G47.33 OSA (OBSTRUCTIVE SLEEP APNEA): Chronic | Status: ACTIVE | Noted: 2025-07-13

## 2025-07-13 PROBLEM — E66.812 CLASS 2 SEVERE OBESITY DUE TO EXCESS CALORIES WITH SERIOUS COMORBIDITY AND BODY MASS INDEX (BMI) OF 35.0 TO 35.9 IN ADULT (H): Status: ACTIVE | Noted: 2025-07-13

## 2025-07-13 PROBLEM — E66.01 CLASS 2 SEVERE OBESITY DUE TO EXCESS CALORIES WITH SERIOUS COMORBIDITY AND BODY MASS INDEX (BMI) OF 35.0 TO 35.9 IN ADULT (H): Status: ACTIVE | Noted: 2025-07-13

## 2025-07-13 PROBLEM — Z98.890 STATUS POST CORONARY ANGIOGRAM: Status: RESOLVED | Noted: 2024-03-14 | Resolved: 2025-07-13

## 2025-07-13 PROBLEM — R97.20 HIGH PROSTATE SPECIFIC ANTIGEN (PSA): Status: ACTIVE | Noted: 2024-09-20

## 2025-07-15 ENCOUNTER — RESULTS FOLLOW-UP (OUTPATIENT)
Dept: SLEEP MEDICINE | Facility: CLINIC | Age: 70
End: 2025-07-15
Payer: MEDICARE

## 2025-07-15 DIAGNOSIS — G47.33 OSA (OBSTRUCTIVE SLEEP APNEA): Primary | ICD-10-CM

## 2025-07-22 ENCOUNTER — LAB REQUISITION (OUTPATIENT)
Dept: LAB | Facility: CLINIC | Age: 70
End: 2025-07-22
Payer: MEDICARE

## 2025-07-22 DIAGNOSIS — E78.2 MIXED HYPERLIPIDEMIA: ICD-10-CM

## 2025-07-22 DIAGNOSIS — I10 ESSENTIAL (PRIMARY) HYPERTENSION: ICD-10-CM

## 2025-07-22 LAB
ALBUMIN SERPL BCG-MCNC: 4.2 G/DL (ref 3.5–5.2)
ALP SERPL-CCNC: 99 U/L (ref 40–150)
ALT SERPL W P-5'-P-CCNC: 29 U/L (ref 0–70)
ANION GAP SERPL CALCULATED.3IONS-SCNC: 11 MMOL/L (ref 7–15)
AST SERPL W P-5'-P-CCNC: 29 U/L (ref 0–45)
BILIRUB SERPL-MCNC: 0.8 MG/DL
BUN SERPL-MCNC: 15.8 MG/DL (ref 8–23)
CALCIUM SERPL-MCNC: 9.7 MG/DL (ref 8.8–10.4)
CHLORIDE SERPL-SCNC: 106 MMOL/L (ref 98–107)
CHOLEST SERPL-MCNC: 132 MG/DL
CREAT SERPL-MCNC: 0.95 MG/DL (ref 0.67–1.17)
EGFRCR SERPLBLD CKD-EPI 2021: 86 ML/MIN/1.73M2
FASTING STATUS PATIENT QL REPORTED: ABNORMAL
FASTING STATUS PATIENT QL REPORTED: NORMAL
GLUCOSE SERPL-MCNC: 104 MG/DL (ref 70–99)
HCO3 SERPL-SCNC: 26 MMOL/L (ref 22–29)
HDLC SERPL-MCNC: 47 MG/DL
LDLC SERPL CALC-MCNC: 72 MG/DL
NONHDLC SERPL-MCNC: 85 MG/DL
POTASSIUM SERPL-SCNC: 4.8 MMOL/L (ref 3.4–5.3)
PROT SERPL-MCNC: 6.7 G/DL (ref 6.4–8.3)
SODIUM SERPL-SCNC: 143 MMOL/L (ref 135–145)
TRIGL SERPL-MCNC: 63 MG/DL

## 2025-08-30 ENCOUNTER — HEALTH MAINTENANCE LETTER (OUTPATIENT)
Age: 70
End: 2025-08-30

## (undated) DEVICE — ESU GROUND PAD ADULT REM W/15' CORD E7507DB

## (undated) DEVICE — SU PLEDGET SOFT TFE 3/8"X3/26"X1/16" PCP40

## (undated) DEVICE — SU STERNAL WIRE SINGLE #6 046-032

## (undated) DEVICE — PREP CHLORAPREP 26ML TINTED HI-LITE ORANGE 930815

## (undated) DEVICE — SET CANNULATION TOUNIQUET TS-10061

## (undated) DEVICE — GLOVE BIOGEL PI SZ 7.0 40870

## (undated) DEVICE — SOL WATER IRRIG 1000ML BOTTLE 2F7114

## (undated) DEVICE — RX SURGIFLO HEMOSTATIC MATRIX W/THROMBIN 8ML 2994

## (undated) DEVICE — CATH ANGIO INFINITI AL1 4FRX100CM 538445

## (undated) DEVICE — LEAD PACER MYOCARDIAL BIPOLAR TEMPORARY 53CM 6495F

## (undated) DEVICE — SU PROLENE 4-0 RB-1DA 36" 8557H

## (undated) DEVICE — GLOVE BIOGEL PI SZ 6.5 40865

## (undated) DEVICE — SURGICEL POWDER ABSORBABLE HEMOSTAT 3GM 3013SP

## (undated) DEVICE — SPONGE LAP 18X18" X8435

## (undated) DEVICE — CATH SUCTION 14FR W/O CTRL DYND41962

## (undated) DEVICE — CELL SAVER

## (undated) DEVICE — DEVICE TISSUE STABILIZATION OCTOBASE 28707

## (undated) DEVICE — SYR ANGIOGRAPHY MULTIUSE KIT ACIST 014612

## (undated) DEVICE — SOL NACL 0.9% IRRIG 1000ML BOTTLE 2F7124

## (undated) DEVICE — SUCTION CANISTER MEDIVAC LINER 3000ML W/LID 65651-530

## (undated) DEVICE — MANIFOLD KIT ANGIO AUTOMATED 014613

## (undated) DEVICE — PACK MINOR SINGLE BASIN SSK3001

## (undated) DEVICE — SU PROLENE 3-0 SHDA 36" 8522H

## (undated) DEVICE — SU ETHIBOND 0 CT-1 CR 8X18" CX21D

## (undated) DEVICE — ESU HOLSTER PLASTIC DISP E2400

## (undated) DEVICE — CATH THORACIC STRAIGHT CLOTSTOP 28FR

## (undated) DEVICE — SU MYOSTERNAL WIRE  046-267

## (undated) DEVICE — ELECTRODE DEFIB CADENCE 22550R

## (undated) DEVICE — PITCHER STERILE 1000ML  SSK9004A

## (undated) DEVICE — TUBING SMOKE EVAC PNEUMOCLEAR HIGH FLOW 0620050250

## (undated) DEVICE — CUSTOM PACK CV ST JOES SCV5BCVHEA

## (undated) DEVICE — SU PDS II 3-0 SH 27" Z316H

## (undated) DEVICE — CATH DIAGNOSTIC RADIAL 5FR TIG 4.0

## (undated) DEVICE — KIT HAND CONTROL ACIST 014644 AR-P54

## (undated) DEVICE — DRSG DRAIN 4X4" 7086

## (undated) DEVICE — SPONGE RAY-TEC 4X8" 7318

## (undated) DEVICE — EXCHANGE WIRE .035 260 STAR/JFC/035/260/ M001491681

## (undated) DEVICE — SUCTION DRY CHEST DRAIN OASIS 3600-100

## (undated) DEVICE — SU DERMABOND ADVANCED .7ML DNX12

## (undated) DEVICE — Device

## (undated) DEVICE — SHTH INTRO 0.021IN ID 6FR DIA

## (undated) DEVICE — SLEEVE TR BAND RADIAL COMPRESSION DEVICE 29CM XX-RF06L

## (undated) DEVICE — HEMOSTASIS BONE OSTENE 2.5G SYNTHETIC 1503832

## (undated) DEVICE — GOWN IMPERVIOUS BREATHABLE SMART XLG 89045

## (undated) DEVICE — CUSTOM PACK CORONARY SAN5BCRHEA

## (undated) RX ORDER — PROTAMINE SULFATE 10 MG/ML
INJECTION, SOLUTION INTRAVENOUS
Status: DISPENSED
Start: 2024-04-02

## (undated) RX ORDER — DIAZEPAM 5 MG
TABLET ORAL
Status: DISPENSED
Start: 2024-03-14

## (undated) RX ORDER — VASOPRESSIN 20 U/ML
INJECTION PARENTERAL
Status: DISPENSED
Start: 2024-04-02

## (undated) RX ORDER — PROPOFOL 10 MG/ML
INJECTION, EMULSION INTRAVENOUS
Status: DISPENSED
Start: 2024-04-02

## (undated) RX ORDER — LIDOCAINE HYDROCHLORIDE 10 MG/ML
INJECTION, SOLUTION EPIDURAL; INFILTRATION; INTRACAUDAL; PERINEURAL
Status: DISPENSED
Start: 2024-04-02

## (undated) RX ORDER — FENTANYL CITRATE 50 UG/ML
INJECTION, SOLUTION INTRAMUSCULAR; INTRAVENOUS
Status: DISPENSED
Start: 2024-03-14

## (undated) RX ORDER — FENTANYL CITRATE 50 UG/ML
INJECTION, SOLUTION INTRAMUSCULAR; INTRAVENOUS
Status: DISPENSED
Start: 2024-04-02

## (undated) RX ORDER — VANCOMYCIN HYDROCHLORIDE 1 G/20ML
INJECTION, POWDER, LYOPHILIZED, FOR SOLUTION INTRAVENOUS
Status: DISPENSED
Start: 2024-04-02

## (undated) RX ORDER — DEXAMETHASONE SODIUM PHOSPHATE 10 MG/ML
INJECTION, SOLUTION INTRAMUSCULAR; INTRAVENOUS
Status: DISPENSED
Start: 2024-04-02